# Patient Record
Sex: FEMALE | Race: WHITE | Employment: OTHER | URBAN - METROPOLITAN AREA
[De-identification: names, ages, dates, MRNs, and addresses within clinical notes are randomized per-mention and may not be internally consistent; named-entity substitution may affect disease eponyms.]

---

## 2019-06-21 ENCOUNTER — HOSPITAL ENCOUNTER (INPATIENT)
Age: 74
LOS: 12 days | Discharge: HOME | DRG: 057 | End: 2019-07-03
Attending: PHYSICAL MEDICINE & REHABILITATION | Admitting: PHYSICAL MEDICINE & REHABILITATION
Payer: MEDICARE

## 2019-06-21 PROCEDURE — 1280000000 HC REHAB R&B

## 2019-06-21 ASSESSMENT — PAIN SCALES - GENERAL: PAINLEVEL_OUTOF10: 0

## 2019-06-21 NOTE — LETTER
PORTABLE PATIENT PROFILE  Pam Nelson  [id number]    MEDICAL DIAGNOSIS/CONDITION:   Patient Active Problem List   Diagnosis    [facility]    [facility]ure disorder ([facility]    History of resection of meningioma    Left hemiparesis ([facility]       INSURANCE INFORMATION:  Payor: [place] [employer] /  /  /     ADVANCED DIRECTIVES:   Advance Directives (For Healthcare)  Pre-existing DNR Comfort Care/DNR Arrest/DNI Order: No  Healthcare Directive: No, patient does not have an advance directive for healthcare treatment  Information on Healthcare Directives Requested: No  Advance Directives: Pt. not interested at this time  [unfilled]     EMERGENCY CONTACT:       RISK FACTORS:   Social History     Tobacco Use    Smoking status: Passive Smoke Exposure - Never Smoker    Smokeless tobacco: Never Used   Substance Use Topics    Alcohol use: Never     Frequency: Never       ALLERGIES:  No Known Allergies    IMMUNIZATIONS:    There is no immunization history on file for this patient. SWALLOWING:   Difficulty Chewing or Swallowing Food: No    VISION AND HEARING:   Sensory Problems  Visual impairment: Glasses    PHYSICIANS INVOLVED WITH CARE:    Matthew Ingram MD  No ref.  provider found  Collins Gerardo MD

## 2019-06-22 PROBLEM — I63.9 ACUTE CVA (CEREBROVASCULAR ACCIDENT) (HCC): Status: ACTIVE | Noted: 2019-06-22

## 2019-06-22 PROCEDURE — 97165 OT EVAL LOW COMPLEX 30 MIN: CPT

## 2019-06-22 PROCEDURE — 92610 EVALUATE SWALLOWING FUNCTION: CPT | Performed by: SPEECH-LANGUAGE PATHOLOGIST

## 2019-06-22 PROCEDURE — 97161 PT EVAL LOW COMPLEX 20 MIN: CPT

## 2019-06-22 PROCEDURE — 97530 THERAPEUTIC ACTIVITIES: CPT

## 2019-06-22 PROCEDURE — 92526 ORAL FUNCTION THERAPY: CPT | Performed by: SPEECH-LANGUAGE PATHOLOGIST

## 2019-06-22 PROCEDURE — 1280000000 HC REHAB R&B

## 2019-06-22 PROCEDURE — 97535 SELF CARE MNGMENT TRAINING: CPT

## 2019-06-22 PROCEDURE — 6370000000 HC RX 637 (ALT 250 FOR IP): Performed by: PHYSICAL MEDICINE & REHABILITATION

## 2019-06-22 PROCEDURE — 92523 SPEECH SOUND LANG COMPREHEN: CPT | Performed by: SPEECH-LANGUAGE PATHOLOGIST

## 2019-06-22 RX ORDER — LEVETIRACETAM 500 MG/1
500 TABLET ORAL 2 TIMES DAILY
COMMUNITY
Start: 2019-06-22 | End: 2019-10-08 | Stop reason: SDUPTHER

## 2019-06-22 RX ORDER — CLOPIDOGREL BISULFATE 75 MG/1
75 TABLET ORAL DAILY
Status: ON HOLD | COMMUNITY
Start: 2019-06-22 | End: 2019-07-02 | Stop reason: SDUPTHER

## 2019-06-22 RX ORDER — ATORVASTATIN CALCIUM 40 MG/1
40 TABLET, FILM COATED ORAL NIGHTLY
Status: DISCONTINUED | OUTPATIENT
Start: 2019-06-22 | End: 2019-06-23 | Stop reason: CLARIF

## 2019-06-22 RX ORDER — ACETAMINOPHEN 325 MG/1
650 TABLET ORAL EVERY 4 HOURS PRN
Status: DISCONTINUED | OUTPATIENT
Start: 2019-06-22 | End: 2019-07-03 | Stop reason: HOSPADM

## 2019-06-22 RX ORDER — ATORVASTATIN CALCIUM 40 MG/1
40 TABLET, FILM COATED ORAL DAILY
Status: ON HOLD | COMMUNITY
Start: 2019-06-22 | End: 2019-07-02 | Stop reason: SDUPTHER

## 2019-06-22 RX ORDER — CLOPIDOGREL BISULFATE 75 MG/1
75 TABLET ORAL DAILY
Status: DISCONTINUED | OUTPATIENT
Start: 2019-06-22 | End: 2019-07-03 | Stop reason: HOSPADM

## 2019-06-22 RX ORDER — ASPIRIN 81 MG/1
81 TABLET, CHEWABLE ORAL DAILY
Status: ON HOLD | COMMUNITY
Start: 2019-06-22 | End: 2019-07-02 | Stop reason: SDUPTHER

## 2019-06-22 RX ORDER — ASPIRIN 81 MG/1
81 TABLET, CHEWABLE ORAL DAILY
Status: DISCONTINUED | OUTPATIENT
Start: 2019-06-22 | End: 2019-07-03 | Stop reason: HOSPADM

## 2019-06-22 RX ORDER — LAMOTRIGINE 100 MG/1
200 TABLET ORAL 2 TIMES DAILY
Status: DISCONTINUED | OUTPATIENT
Start: 2019-06-22 | End: 2019-07-03 | Stop reason: HOSPADM

## 2019-06-22 RX ORDER — LAMOTRIGINE 200 MG/1
200 TABLET ORAL 2 TIMES DAILY
COMMUNITY
Start: 2019-06-22 | End: 2019-10-08 | Stop reason: SDUPTHER

## 2019-06-22 RX ORDER — LEVETIRACETAM 500 MG/1
500 TABLET ORAL 2 TIMES DAILY
Status: DISCONTINUED | OUTPATIENT
Start: 2019-06-22 | End: 2019-07-03 | Stop reason: HOSPADM

## 2019-06-22 RX ADMIN — LAMOTRIGINE 200 MG: 100 TABLET ORAL at 09:57

## 2019-06-22 RX ADMIN — ATORVASTATIN CALCIUM 40 MG: 40 TABLET, FILM COATED ORAL at 22:00

## 2019-06-22 RX ADMIN — LEVETIRACETAM 500 MG: 500 TABLET ORAL at 09:57

## 2019-06-22 RX ADMIN — CLOPIDOGREL 75 MG: 75 TABLET, FILM COATED ORAL at 09:57

## 2019-06-22 RX ADMIN — ASPIRIN 81 MG 81 MG: 81 TABLET ORAL at 09:57

## 2019-06-22 RX ADMIN — LAMOTRIGINE 200 MG: 100 TABLET ORAL at 22:00

## 2019-06-22 RX ADMIN — LEVETIRACETAM 500 MG: 500 TABLET ORAL at 22:00

## 2019-06-22 ASSESSMENT — 9 HOLE PEG TEST
TESTTIME_SECONDS: 24
TESTTIME_SECONDS: 39

## 2019-06-22 ASSESSMENT — PAIN SCALES - GENERAL
PAINLEVEL_OUTOF10: 0

## 2019-06-22 NOTE — PROGRESS NOTES
SPEECH/LANGUAGE PATHOLOGY  SPEECH/LANGUAGE/COGNITIVE EVALUATION      PATIENT NAME:  Ruben Isbell      :  1945      TODAY'S DATE:  2019      SPEECH PATHOLOGY DIAGNOSIS:   Mild-Moderate Cognitive-linguistic deficits    THERAPY RECOMMENDATIONS:   []Speech Pathology intervention is not warranted at this time. [x]Speech Pathology intervention is recommended with emphasis on the followin. To improve attention/ reasoning/ problem solving and recall of functional information for increased success and ability to return to OF  2.  Compensatory Strategy training and oral motor exercises for increased intelligibility  FIMS SCORES      Receptive    Current Status  4-5  Short Term Goal 6   Long Term Goal 6   Expressive    Current Status  5  Short Term Goal 6   Long Term Goal 6  Social Interaction    Current Status  3  Short Term Goal 5   Long Term Goal 6      Problem Solving    Current Status  4  Short Term Goal 5   Long Term Goal 6   Memory    Current Status  4  Short Term Goal 5   Long Term Goal 6                   MOTOR SPEECH          Oral Peripheral Examination   []Adequate lingual/labial strength   [x]Generalized oral weakness   []Right labiobuccal weakness   []Left labiobuccal weakness   []Right lingual deviation    []Left lingual deviation   []Inadequate velopharyngeal closure  []Oral apraxia      []CNT    Parameters of Speech Production  Respiration:  [x]WFL []SOB []Inadequate for speech production  Articulation:  []WFL [x]Distortions []Anticipatory struggle []CNT  Resonance:  [x]WFL []Hypernasal  []Hyponasal  []Nasal emission []CNT  Quality:   [x]WFL []Hoarse []Harsh []Strained [] Breathiness []CNT  Pitch:    [x]WFL []High []Low []CNT  Intensity: [x]WFL []Loud []Quiet []CNT  Fluency:  [x]Intact []Dysfluent []CNT  Prosody [x]Intact []Monotone []Irregular fluctuation      RECEPTIVE LANGUAGE       Comprehension of Yes/No Questions:   [x]WNL []Incomplete []Latent  []Inconsistent []Perseveration week  if warranted. [x]  Patient stated goals: agreed with above  [x]  Treatment goals discussed with [x]  patient/  []  family. [x]  The [x]  patient/ []  family understand the diagnosis, prognosis and plan of care. [] Malnutrition indicators have been identified and nursing has been notified to ensure a dietary consult is ordered. [x]The admitting diagnosis and active problem list, as listed below have been reviewed prior to initiation of this evaluation.      ADMITTING DIAGNOSIS: Acute CVA (cerebrovascular accident) [facility]) [I63.9]     ACTIVE PROBLEM LIST:   Patient Active Problem List   Diagnosis    Acute CVA (cerebrovascular accident) [facility])         Reji Olrando M.S., [address] [facility]  Speech-Language Pathologist  [id number]  6/22/2019

## 2019-06-22 NOTE — PLAN OF CARE
Problem: Falls - Risk of:  Goal: Will remain free from falls  Description  Will remain free from falls  6/22/2019 1103 by Lucy Hsu RN  Outcome: Met This Shift  6/22/2019 0851 by June Mathew RN  Outcome: Met This Shift

## 2019-06-22 NOTE — PLAN OF CARE
Problem: Falls - Risk of:  Goal: Will remain free from falls  Description  Will remain free from falls  Outcome: Met This Shift  Goal: Absence of physical injury  Description  Absence of physical injury  Outcome: Met This Shift     Problem: Mobility - Impaired:  Goal: Mobility will improve  Description  Mobility will improve  Outcome: Met This Shift

## 2019-06-22 NOTE — H&P
[address]                  [address] [facility],  [facility]                              HISTORY AND PHYSICAL    PATIENT NAME: Clinton Hammans                       :        1945  MED REC NO:   [id number]                            ROOM:       5501  ACCOUNT NO:   [de-identified]                           ADMIT DATE: 2019  PROVIDER:     Milad Solis MD    REHAB HISTORY AND PHYSICAL    CHIEF COMPLAINT:  CVA. HISTORY OF PRESENT ILLNESS:  The patient had a meningioma resected at  [facility] in [place] in . She did have seizures and was  on Keppra and Lamictal for those. She developed sudden onset of left  side weakness. She went initially to [facility] and then was  transferred back to [facility] where her neurologist is. MRI confirmed a  new CVA in the right hemisphere. She is functionally at a min to mod  assist level with everything and is felt to be a good candidate for  further rehab. PAST MEDICAL HISTORY:  1. Meningioma. 2.  Seizure disorder. ALLERGIES:  None known. CURRENT MEDICATIONS:  Include:  1. Aspirin. 2.  Lipitor. 3.  Plavix. 4.  Lamictal.  5.  Keppra. HABITS:  No smoking or alcohol. SOCIAL HISTORY:  She will be living with her son at discharge. REVIEW OF SYSTEMS:  Negative for prior HEENT problems. Negative for  prior cardiac or pulmonary problems. Negative for prior GI or   problems. Negative for prior orthopedic deficits. Neurologic is  positive for the meningioma and seizures. PHYSICAL EXAMINATION:  GENERAL:  Well-nourished, well-developed white female in no acute  distress. HEENT:  Head:  Normocephalic, atraumatic. Eyes:  Pupils equal, round,  reactive to light. Pharynx:  Normal.  LUNGS:  Clear to P and A. HEART:  Regular rate and rhythm. S1, S2 normal.  No murmurs or gallops. ABDOMEN:  Bowel sounds normal.  Soft, nontender. No masses or  organomegaly.   EXTREMITIES:

## 2019-06-22 NOTE — PROGRESS NOTES
(secs): 24     Assessment   Performance deficits / Impairments: Decreased functional mobility ; Decreased strength;Decreased endurance;Decreased coordination;Decreased ADL status; Decreased safe awareness;Decreased cognition;Decreased balance;Decreased fine motor control  Prognosis: Good  Decision Making: Low Complexity    Treatment/education: Initial OT eval completed. Pt presents supine in bed and agreeable to OT session. SBA for supine-sit EOB. Min A for functional mobility to/from bathroom using no AD. Completed ADLs this AM see above for assessment. Educated pt on OT POC and pt participated in goal planning. Educated pt on hand placement/body mechanics/posture/balance to increase safety with ADLs/functional transfers/mobility. Requires increased time to answer questsions due to what appears to be word fincing difficulty. Demonstrates fine motor coordination deficit. Pt demonstrates fair insight/safety awareness. Pt appears motivated/pleasant/cooperative. Pt appeared to have tolerated session well. Initiate OT POC.      REQUIRES OT FOLLOW UP: Yes  Activity Tolerance: Patient Tolerated treatment well  Safety Devices in place: Yes  Type of devices: Call light within reach        Plan    2x/day, 5-7 days/wk  Plan weeks: 1-3  Current Treatment Recommendations: Strengthening, Functional Mobility Training, Gait Training, Patient/Caregiver Education & Training, Home Management Training, Endurance Training, Equipment Evaluation, Education, & procurement, Balance Training, Neuromuscular Re-education, Safety Education & Training, Self-Care / ADL    Goals  Long term goals  Time Frame for Long term goals : 1-3 wks  Long term goal 1: Pt will be IND with self feeding  Long term goal 2: Pt will be Mod I for grooming task standing/seated at sink  Long term goal 3: Pt will be Mod I for UE dressing  Long term goal 4: Pt will be Mod I for LE dressing  Long term goal 5: Pt will be Mod I for bathing task seated/standing  Long term goals 6: Pt will be Mod I for commode and shower transfers  Long term goal 7: Pt will be SUP for homemaking task  Long term goal 8: Pt will demo G safety,insight,reasoning, and judgement during functional activities  Patient Goals   Patient goals : \"to improve my strength and balance\"       Therapy Time   Individual Concurrent Group Co-treatment   Time In Eval: 7:00  Tx:7:15         Time Out Eval: 7:15  Tx: 8:00         Minutes Eval: 15  Tx: [id number] [address], [address] [place] [address], OTR/L [id number]

## 2019-06-23 LAB
ANION GAP SERPL CALCULATED.3IONS-SCNC: 13 MMOL/L (ref 7–16)
BASOPHILS ABSOLUTE: 0.02 E9/L (ref 0–0.2)
BASOPHILS RELATIVE PERCENT: 0.4 % (ref 0–2)
BUN BLDV-MCNC: 14 MG/DL (ref 8–23)
CALCIUM SERPL-MCNC: 9.6 MG/DL (ref 8.6–10.2)
CHLORIDE BLD-SCNC: 104 MMOL/L (ref 98–107)
CO2: 25 MMOL/L (ref 22–29)
CREAT SERPL-MCNC: 0.7 MG/DL (ref 0.5–1)
EOSINOPHILS ABSOLUTE: 0.12 E9/L (ref 0.05–0.5)
EOSINOPHILS RELATIVE PERCENT: 2.2 % (ref 0–6)
GFR AFRICAN AMERICAN: >60
GFR NON-AFRICAN AMERICAN: >60 ML/MIN/1.73
GLUCOSE BLD-MCNC: 85 MG/DL (ref 74–99)
HCT VFR BLD CALC: 40.1 % (ref 34–48)
HEMOGLOBIN: 12.9 G/DL (ref 11.5–15.5)
IMMATURE GRANULOCYTES #: 0.01 E9/L
IMMATURE GRANULOCYTES %: 0.2 % (ref 0–5)
LYMPHOCYTES ABSOLUTE: 1.77 E9/L (ref 1.5–4)
LYMPHOCYTES RELATIVE PERCENT: 32.2 % (ref 20–42)
MCH RBC QN AUTO: 29.4 PG (ref 26–35)
MCHC RBC AUTO-ENTMCNC: 32.2 % (ref 32–34.5)
MCV RBC AUTO: 91.3 FL (ref 80–99.9)
MONOCYTES ABSOLUTE: 0.49 E9/L (ref 0.1–0.95)
MONOCYTES RELATIVE PERCENT: 8.9 % (ref 2–12)
NEUTROPHILS ABSOLUTE: 3.09 E9/L (ref 1.8–7.3)
NEUTROPHILS RELATIVE PERCENT: 56.1 % (ref 43–80)
PDW BLD-RTO: 14.3 FL (ref 11.5–15)
PLATELET # BLD: 325 E9/L (ref 130–450)
PMV BLD AUTO: 9.1 FL (ref 7–12)
POTASSIUM REFLEX MAGNESIUM: 4.2 MMOL/L (ref 3.5–5)
RBC # BLD: 4.39 E12/L (ref 3.5–5.5)
SODIUM BLD-SCNC: 142 MMOL/L (ref 132–146)
WBC # BLD: 5.5 E9/L (ref 4.5–11.5)

## 2019-06-23 PROCEDURE — 6370000000 HC RX 637 (ALT 250 FOR IP): Performed by: PHYSICAL MEDICINE & REHABILITATION

## 2019-06-23 PROCEDURE — 1280000000 HC REHAB R&B

## 2019-06-23 PROCEDURE — 97530 THERAPEUTIC ACTIVITIES: CPT

## 2019-06-23 PROCEDURE — 36415 COLL VENOUS BLD VENIPUNCTURE: CPT

## 2019-06-23 PROCEDURE — 80048 BASIC METABOLIC PNL TOTAL CA: CPT

## 2019-06-23 PROCEDURE — 85025 COMPLETE CBC W/AUTO DIFF WBC: CPT

## 2019-06-23 RX ORDER — ATORVASTATIN CALCIUM 40 MG/1
40 TABLET, FILM COATED ORAL NIGHTLY
Status: DISCONTINUED | OUTPATIENT
Start: 2019-06-23 | End: 2019-07-03 | Stop reason: HOSPADM

## 2019-06-23 RX ORDER — ERGOCALCIFEROL 1.25 MG/1
50000 CAPSULE ORAL WEEKLY
Status: DISCONTINUED | OUTPATIENT
Start: 2019-06-23 | End: 2019-07-03 | Stop reason: HOSPADM

## 2019-06-23 RX ADMIN — LAMOTRIGINE 200 MG: 100 TABLET ORAL at 09:55

## 2019-06-23 RX ADMIN — CLOPIDOGREL 75 MG: 75 TABLET, FILM COATED ORAL at 09:55

## 2019-06-23 RX ADMIN — LAMOTRIGINE 200 MG: 100 TABLET ORAL at 21:21

## 2019-06-23 RX ADMIN — LEVETIRACETAM 500 MG: 500 TABLET ORAL at 09:55

## 2019-06-23 RX ADMIN — ATORVASTATIN CALCIUM 40 MG: 40 TABLET, FILM COATED ORAL at 21:22

## 2019-06-23 RX ADMIN — ERGOCALCIFEROL 50000 UNITS: 1.25 CAPSULE, LIQUID FILLED ORAL at 09:55

## 2019-06-23 RX ADMIN — ASPIRIN 81 MG 81 MG: 81 TABLET ORAL at 09:55

## 2019-06-23 RX ADMIN — LEVETIRACETAM 500 MG: 500 TABLET ORAL at 21:22

## 2019-06-23 ASSESSMENT — PAIN SCALES - GENERAL
PAINLEVEL_OUTOF10: 0

## 2019-06-23 NOTE — PLAN OF CARE
Problem: Falls - Risk of:  Goal: Will remain free from falls  Description  Will remain free from falls  6/23/2019 1437 by Pablito Verde RN  Outcome: Met This Shift  6/23/2019 0209 by Shon Coburn RN  Outcome: Met This Shift

## 2019-06-24 PROCEDURE — 97530 THERAPEUTIC ACTIVITIES: CPT

## 2019-06-24 PROCEDURE — 97110 THERAPEUTIC EXERCISES: CPT

## 2019-06-24 PROCEDURE — 92526 ORAL FUNCTION THERAPY: CPT

## 2019-06-24 PROCEDURE — 1280000000 HC REHAB R&B

## 2019-06-24 PROCEDURE — 6370000000 HC RX 637 (ALT 250 FOR IP): Performed by: PHYSICAL MEDICINE & REHABILITATION

## 2019-06-24 PROCEDURE — 97127 HC SP THER IVNTJ W/FOCUS COG FUNCJ: CPT

## 2019-06-24 PROCEDURE — 97112 NEUROMUSCULAR REEDUCATION: CPT

## 2019-06-24 PROCEDURE — 99232 SBSQ HOSP IP/OBS MODERATE 35: CPT | Performed by: PHYSICAL MEDICINE & REHABILITATION

## 2019-06-24 RX ADMIN — CLOPIDOGREL 75 MG: 75 TABLET, FILM COATED ORAL at 08:25

## 2019-06-24 RX ADMIN — LEVETIRACETAM 500 MG: 500 TABLET ORAL at 20:46

## 2019-06-24 RX ADMIN — ASPIRIN 81 MG 81 MG: 81 TABLET ORAL at 08:25

## 2019-06-24 RX ADMIN — LAMOTRIGINE 200 MG: 100 TABLET ORAL at 08:25

## 2019-06-24 RX ADMIN — ATORVASTATIN CALCIUM 40 MG: 40 TABLET, FILM COATED ORAL at 20:46

## 2019-06-24 RX ADMIN — LEVETIRACETAM 500 MG: 500 TABLET ORAL at 08:25

## 2019-06-24 RX ADMIN — LAMOTRIGINE 200 MG: 100 TABLET ORAL at 20:46

## 2019-06-24 ASSESSMENT — PAIN SCALES - GENERAL: PAINLEVEL_OUTOF10: 0

## 2019-06-24 NOTE — CARE COORDINATION
Social Work Assessment Summary    PCP: CELSO NeumannVA)    Patient Resides: Alone. She is . Home Architecture : She will move in with [facility]. They reside in a [facility] with (2) front entry steps (2) rails and (0) entry steps thru the garage. Pt. Uses a walk in shower. Will you return to your own home? Yes        Primary Caregiver: Pt. Has (3) children; Eleuterio Toure (36) lives in [address] (37) and Kristofer (52) who lives in [place]. Marcie works [address] [place] [address] as a  of the [employer]. Daughter in law, Markell Israel, does not work. Level of Function PTA : Pt. Was independent in all areas. Employment: "[employer]. [address] 36hrs/wk.     DME Pta  : None    Community Agency Involvement PTA : None    Do they have a medical profile: No    Family Teaching: to be scheduled    Strength: \"I am nice and motivated\"    Preference: \"strength in arm & leg and improve balance\"      NAME RELATION HOME # WORK # CELL #   [place] [facility] [place] [phone]  [phone]   [facility] [address] [place] [address]       Height: 5'2  Weight: 125    Jocelyn Bump  6/24/2019

## 2019-06-24 NOTE — PROGRESS NOTES
LE: 4+/5  Left LE: hip 3+/5, knee 4-/5, ankle 3/5 R LE: 4+/5  Left LE: hip 3+/5, knee 4-/5, ankle 3/5      Balance  Sitting: sup  Standing: cg/sba without AD Sitting: sup  Standing: CGA      Date Family Teach Completed TBA TBD      Is additional Family Teaching Needed? Y or N Y Y      Hindering Progress Balance/coordination Balance/coordination, endurance       PT recommended ELOS 7-10 days 10 days      Team's Discharge Plan        Therapist at Team Meeting           Date: 6/24/19  Supporting factors: Pt motivated with good carryover   Barriers to discharge: decreased balance and coordination deficits  Additional comments: Pt requires cues for safety with mobility as pt is mildly impulsive at times. Pt able to correct gait deficits with cues but does not sustain. May require supervision at d/c (goals kept at mod I at this time).   DME: None (but will assess for need for AD)  After Care: Regla Santos PTA [id number]

## 2019-06-24 NOTE — PROGRESS NOTES
Occupational Therapy  OCCUPATIONAL THERAPY DAILY NOTE    Date:2019  Patient Name: Heather Lundberg  MRN: [id number]  : 1945  Room: [address] [place] [address]     Diagnosis: CVA  Additional Pertinent Hx: none on file  Referring Practitioner:   Precautions: falls, L sided weakness, holter monitor (L chest)    Functional Assessment:   Date Status AE  Comments   Feeding 19 Set up     Grooming 19 Minimal Assist      Bathing 19 Minimal Assist      UB Dressing 19 Min A     LB Dressing 19 Stand by Assist      Homemaking 19 Min A No AD Min A due to unsteady balance with functional mobility around kitchen. See below. Functional Transfers / Balance:   Date Status DME  Comments   Sit Balance 19 Supervision      Stand Balance 19 Minimal Assist  tabletop In kitchen   [] Tub  [x] Shower   Transfer 19 Minimal Assist      Commode   Transfer 19 Minimal Assist      Functional   Mobility 19 Minimal Assist  w/w Throughout kitchen   Other:         Functional Exercises / Activity:   LUE ROM and strength with arm bike 5 mins for 3 reps   LUE gross motor coordination and strength with graded clothespin activity   LUE fine motor coordination with purdue peg board with fair + tip to tip prehension patterns present     Pt participated in standing balance/endurance activity at SBA at table top level. Pt tolerated standing for 8 mins before needing seated rest break     Pt seen this PM in kitchen. Pt retrieved items from fridge, followed directions to bake cookies, mixed items, spread onto pan, place into oven, and wash dishes at sink. Pt had 1 LOB at counter and required mutiple sitting rest breaks throughout session. Pt appeared to have tolerated session well.     Sensory / Neuromuscular Re-Education:      Cognitive Skills:   Status Comments   Problem   Solving poor+ During functional activities   Memory good - \"   Sequencing fair  \"   Safety fair  \"     Visual

## 2019-06-24 NOTE — PROGRESS NOTES
Speech Language Pathology  ACUTE REHABILITATION -- DAILY PROGRESS NOTE                 SWALLOWING:  Current level: SUPERVISION    Patient actively participated in functionally-based mealtime assessment; required min assistance to set up meal tray but was able to feed self independently. Reviewed need for slow rate of intake and regulated bite size prior to initiation of PO intake. Diet: Regular consistency solids and thin consistency liquids     Oral prep/oral-     No oral containment issues were identified. Adequate oral prep and A-P propulsion of bolus were noted with good clearance of oral residuals. Pharyngeal-     Clear vocal quality was maintained throughout. No overt clinical indicators of aspiration were apparent. LANGUAGE:        RECEPTIVE:  Current FIM level: SUPERVISION/MIN ASSISTANCE      Short term FIM goal: SUPERVISION      Long term FIM goal: MODIFIED INDEPENDENT          EXPRESSIVE:   Current FIM level: SUPERVISION      Short term FIM goal:       Long term FIM goal:  MODIFIED INDEPENDENT    Pt expressed wants and needs appropriately     COGNITION:       PROBLEM SOLVING: Current FIM level: MIN ASSISTANCE      Short term FIM goal: SUPERVISION       Long term FIM goal: MODIFIED INDEPENDENT         MEMORY:    Current FIM level: MIN ASSISTANCE        Short term FIM goal: SUPERVISION      Long term FIM goal:  MODIFIED INDEPENDENT        SAFETY/INSIGHT:  Fair for all observed tasks. Functional problem solving for medication completed with fair ability and mod cuing. Per pt, she will be living with her son. Pt reported sorting and problem solving independently prior to admission to the hospital. Education way provided on strategies to improve medication management. Pt was in agreement with the education. SPEECH INTELLIGIBILITY:  Good for unstructured conversation, unknown topic.        Three Hour Rule Tracking:    Individual therapy:              0 minutes  Concurrent

## 2019-06-24 NOTE — PROGRESS NOTES
[facility]  Comprehensive Progress Note    Subjective:      Pam Nelson is a 69 y.o. female admitted to inpatient rehabilitation for impairments and activities limitations secondary to Acute CVA    No acute events overnight. No cp, sob, n/v. No pain complaints. Tolerating rehab evaluations. The patient's medical records have been reviewed. Scheduled Meds:  vitamin D 50,000 Units Weekly   atorvastatin 40 mg Nightly   aspirin 81 mg Daily   clopidogrel 75 mg Daily   lamoTRIgine 200 mg BID   levETIRAcetam 500 mg BID     Continuous Infusions:  PRN Meds:  acetaminophen 650 mg Q4H PRN   magnesium hydroxide 30 mL Daily PRN        Objective:      Vitals:    06/22/19 0800 06/23/19 0745 06/23/19 2100 06/24/19 0737   BP: 121/81 113/69 116/72 119/80   Pulse: 70 77 68 78   Resp: 17 16  18   Temp: 97.6 °F (36.4 °C) 98.4 °F (36.9 °C)  97.6 °F (36.4 °C)   TempSrc: Temporal Temporal  Temporal   SpO2: 95% 93%  95%   Weight:       Height:         General appearance: alert, appears stated age and cooperative  Head: Normocephalic, without obvious abnormality, atraumatic  Eyes: conjunctivae/corneas clear. PERRL, EOM's intact. Lungs: clear to auscultation bilaterally  Heart: regular rate and rhythm, S1, S2 normal, no murmur  Abdomen: soft, non-tender, normal bowel sounds  Extremities: extremities normal, atraumatic, no cyanosis or edema  Musculoskeletal: Range of motion normal and no gross abnormalities. Neurologic: AAOx4. Speech minimally dysarthric. Memory impaired, recall 1/3 at 3 min. Impaired problem solving. CN II-XII intactSILT throughout. Motor 5/5 right upper and lower extremity; 4/5 left proximal upper extremity; 3/5 left hand intrinsics; 4-/5 left HF, 4/5 left KE, 3-4-/5 left ankle DF/PF.        Laboratory:    Lab Results   Component Value Date    WBC 5.5 06/23/2019    HGB 12.9 06/23/2019    HCT 40.1 06/23/2019    MCV 91.3 06/23/2019     06/23/2019     Lab Results Component Value Date     06/23/2019    K 4.2 06/23/2019     06/23/2019    CO2 25 06/23/2019    BUN 14 06/23/2019    CREATININE 0.7 06/23/2019    GLUCOSE 85 06/23/2019    CALCIUM 9.6 06/23/2019          Functional Status:   Grooming: Min A  UB dressing: Min A  LB dressing: SBA  Bed mobility: SBA  Transfers: SBA  Ambulation: 200 ft no AD CGA       Assessment/Plan:       69 y.o. 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 14 day implanted Holter monitor (L chest) in place. Continue Aspirin, Plavix, Lipitor.  Continue acute rehab evaluations.   -History of seizure disorder post meningioma resection in 2001: continue [facility]    Team conference tomorrow    Electronically signed by Jelani Pacheco MD on 6/24/2019 at 3:18 PM

## 2019-06-24 NOTE — PROGRESS NOTES
Physical Therapy    Facility/Department: [address] REHAB  Treatment Note    NAME: Shirin Dean  : 1945  MRN: [id number]    Date of Service: 2019    Evaluating Therapist: Karene Babinski, DPT    ROOM: [address] [place] [address]  DIAGNOSIS: acute CVA  PMH: To hospital for for left sided weakness on 19. MRI of brain shows restricted diffusion consistent with acute ischemia involving right parasagittal mid gio.  EEG showed mid/moderate right temporal slowing, suggesting R sided focal lesion. PMH includes epilepsy s/p meningioma resection in ,     PRECAUTIONS: falls     Social:  Pt lives alone in a 1 floor plan apartment 7 steps and one right rail. Pt reporting she plans to move into sons home upon d/c. (home with son, will live in basement set up apartment (shower/bedroom). Reports 12 steps with bilateral HR to kitchen/main living area. PTA was Independent with all mobility. Initial Evaluation  19 AM     PM    Short Term Goals Long Term Goals    Was pt agreeable to Eval/treatment? yes Yes Yes     Does pt have pain?  No pain No No     Bed Mobility  Rolling: sba  Supine to sit: sba  Sit to supine: sba  Scooting: sba Rolling: SBA  Supine to sit: SBA  Sit to supine: SBA  Scooting: SBA NT sup Mod I   Transfers Sit to stand: sba  Stand to sit: sba  Stand pivot: sba without AD Sit to stand: SBA  Stand to sit: SBA  Stand pivot: SBA without AD Sit to stand: SBA  Stand to sit: SBA  Stand pivot: SBA without AD sup Mod I    Ambulation    200 feet with cg/sba with no  feet with no AD with close  feet with no AD with  feet with AAD with sup >500 feet with AAD with mod I   Walking 10 feet on uneven surface 10 feet with cg/sba with no AD NT  feet with AAD with sba >500 feet with AAD with sup   Wheel Chair Mobility n/a NT NT     Car Transfers sba NT SBA sup Mod I   Stair negotiation: ascended and descended  12 steps with bilateral rail with cg/sba 12 steps with bilateral rail with close SBA NT

## 2019-06-25 PROBLEM — Z98.890 HISTORY OF RESECTION OF MENINGIOMA: Status: ACTIVE | Noted: 2019-06-25

## 2019-06-25 PROBLEM — G81.94 LEFT HEMIPARESIS (HCC): Status: ACTIVE | Noted: 2019-06-25

## 2019-06-25 PROBLEM — G40.909 SEIZURE DISORDER (HCC): Status: ACTIVE | Noted: 2019-06-25

## 2019-06-25 PROBLEM — Z86.03 HISTORY OF RESECTION OF MENINGIOMA: Status: ACTIVE | Noted: 2019-06-25

## 2019-06-25 PROCEDURE — 97530 THERAPEUTIC ACTIVITIES: CPT

## 2019-06-25 PROCEDURE — 97535 SELF CARE MNGMENT TRAINING: CPT

## 2019-06-25 PROCEDURE — 97127 HC SP THER IVNTJ W/FOCUS COG FUNCJ: CPT

## 2019-06-25 PROCEDURE — 99232 SBSQ HOSP IP/OBS MODERATE 35: CPT | Performed by: PHYSICAL MEDICINE & REHABILITATION

## 2019-06-25 PROCEDURE — 97110 THERAPEUTIC EXERCISES: CPT

## 2019-06-25 PROCEDURE — 6370000000 HC RX 637 (ALT 250 FOR IP): Performed by: PHYSICAL MEDICINE & REHABILITATION

## 2019-06-25 PROCEDURE — 92526 ORAL FUNCTION THERAPY: CPT

## 2019-06-25 PROCEDURE — 1280000000 HC REHAB R&B

## 2019-06-25 RX ADMIN — ATORVASTATIN CALCIUM 40 MG: 40 TABLET, FILM COATED ORAL at 21:18

## 2019-06-25 RX ADMIN — LAMOTRIGINE 200 MG: 100 TABLET ORAL at 08:43

## 2019-06-25 RX ADMIN — LAMOTRIGINE 200 MG: 100 TABLET ORAL at 21:18

## 2019-06-25 RX ADMIN — CLOPIDOGREL 75 MG: 75 TABLET, FILM COATED ORAL at 08:43

## 2019-06-25 RX ADMIN — LEVETIRACETAM 500 MG: 500 TABLET ORAL at 21:18

## 2019-06-25 RX ADMIN — ASPIRIN 81 MG 81 MG: 81 TABLET ORAL at 08:43

## 2019-06-25 RX ADMIN — LEVETIRACETAM 500 MG: 500 TABLET ORAL at 08:43

## 2019-06-25 SDOH — HEALTH STABILITY: MENTAL HEALTH: HOW OFTEN DO YOU HAVE A DRINK CONTAINING ALCOHOL?: NEVER

## 2019-06-25 ASSESSMENT — PAIN SCALES - GENERAL
PAINLEVEL_OUTOF10: 0

## 2019-06-25 NOTE — PROGRESS NOTES
1130-12  Group therapy:   0 minutes  Co-treatment therapy:  0  minutes    Total minutes: 60 minutes      Viktor Lockett  Speech-Language Pathology Student Intern

## 2019-06-25 NOTE — PROGRESS NOTES
Occupational Therapy  OCCUPATIONAL THERAPY DAILY NOTE    Date:2019  Patient Name: Val Keenan  MRN: [id number]  : 1945  Room: [address] [place] [address]     Diagnosis: CVA  Additional Pertinent Hx: none on file  Referring Practitioner:   Precautions: falls, L sided weakness, holter monitor (L chest)    Functional Assessment:   Date Status AE  Comments   Feeding 19 Set up     Grooming 19 Minimal Assist      Bathing 19 Minimal Assist      UB Dressing 19 Min A     LB Dressing 19 Stand by Assist      Homemaking 19  Min A No AD Folding laundry seated and standing      Functional Transfers / Balance:   Date Status DME  Comments   Sit Balance 19 Supervision      Stand Balance 19  CGA   tabletop    [] Tub  [x] Shower   Transfer 19 Minimal Assist      Commode   Transfer 19 Minimal Assist      Functional   Mobility 19  CGA/Min A  SPC  Throughout therapy apt with one loss of balance needing Min A to recover    Other:         Functional Exercises / Activity:   LUE strength with 2 # dowel rachna 3 sets 10 reps in all planes   L hand strength with 3 # digi flex 3 sets 10 reps   Pt completed 3 D copy design block  patterns with Min verbal cues to complete complex patterns     Sensory / Neuromuscular Re-Education:      Cognitive Skills:  Pt completed safety cards at 100% accuracy   Status Comments   Problem   Solving poor+ During functional activities   Memory good - \"   Sequencing fair  \"   Safety fair  \"     Visual Perception:    Education:   pt was educated on correct sequence of SPC during functional transfers and mobility     [] Family teach completed on:    Pain Level: 0/10    Additional Notes:       Patient has made good  progress during treatment sessions toward set goals.  Therapy emphasis to obtain goals: Strengthening, Functional Mobility Training, Gait Training, Patient/Caregiver Education & Training, Home Management Training, Endurance Training,

## 2019-06-25 NOTE — PROGRESS NOTES
Physical Therapy    Facility/Department: [address] REHAB  Treatment Note    NAME: Olamide Vargas  : 1945  MRN: [id number]    Date of Service: 2019    Evaluating Therapist: Delaney Banks DPT    ROOM: [address] [place] [address]  DIAGNOSIS: acute CVA  PMH: To hospital for for left sided weakness on 19. MRI of brain shows restricted diffusion consistent with acute ischemia involving right parasagittal mid gio.  EEG showed mid/moderate right temporal slowing, suggesting R sided focal lesion. PMH includes epilepsy s/p meningioma resection in ,     PRECAUTIONS: falls     Social:  Pt lives alone in a 1 floor plan apartment 7 steps and one right rail. Pt reporting she plans to move into sons home upon d/c. (home with son, will live in basement set up apartment (shower/bedroom). Reports 12 steps with bilateral HR to kitchen/main living area. PTA was Independent with all mobility. Initial Evaluation  19 AM     PM    Short Term Goals Long Term Goals    Was pt agreeable to Eval/treatment? yes Yes Yes     Does pt have pain? No pain No No     Bed Mobility  Rolling: sba  Supine to sit: sba  Sit to supine: sba  Scooting: sba NT NT sup Mod I   Transfers Sit to stand: sba  Stand to sit: sba  Stand pivot: sba without AD Sit to stand: SBA  Stand to sit: SBA  Stand pivot: SBA without AD Sit to stand: SBA  Stand to sit: SBA  Stand pivot: SBA without AD sup Mod I    Ambulation    200 feet with cg/sba with no  feet with no AD with SBA    150 feet with straight cane with SBA. 150 feet with straight cane /no device with CG/SBA.    300 feet with AAD with sup >500 feet with AAD with mod I   Walking 10 feet on uneven surface 10 feet with cg/sba with no AD NT  feet with AAD with sba >500 feet with AAD with sup   Wheel Chair Mobility n/a NT NT     Car Transfers sba SBA NT sup Mod I   Stair negotiation: ascended and descended  12 steps with bilateral rail with cg/sba 12 steps with bilateral rail with SBA NT >12 steps

## 2019-06-25 NOTE — PLAN OF CARE
Problem: Falls - Risk of:  Goal: Will remain free from falls  Description  Will remain free from falls  6/25/2019 1719 by Funmi Zapata RN  Outcome: Met This Shift  6/25/2019 0527 by Mervat Torres RN  Outcome: Met This Shift

## 2019-06-26 PROCEDURE — 97110 THERAPEUTIC EXERCISES: CPT

## 2019-06-26 PROCEDURE — 6370000000 HC RX 637 (ALT 250 FOR IP): Performed by: PHYSICAL MEDICINE & REHABILITATION

## 2019-06-26 PROCEDURE — 97535 SELF CARE MNGMENT TRAINING: CPT

## 2019-06-26 PROCEDURE — 97530 THERAPEUTIC ACTIVITIES: CPT

## 2019-06-26 PROCEDURE — 97127 HC SP THER IVNTJ W/FOCUS COG FUNCJ: CPT

## 2019-06-26 PROCEDURE — 1280000000 HC REHAB R&B

## 2019-06-26 PROCEDURE — 97112 NEUROMUSCULAR REEDUCATION: CPT

## 2019-06-26 PROCEDURE — 99231 SBSQ HOSP IP/OBS SF/LOW 25: CPT | Performed by: PHYSICAL MEDICINE & REHABILITATION

## 2019-06-26 RX ADMIN — ASPIRIN 81 MG 81 MG: 81 TABLET ORAL at 08:41

## 2019-06-26 RX ADMIN — LEVETIRACETAM 500 MG: 500 TABLET ORAL at 08:41

## 2019-06-26 RX ADMIN — ATORVASTATIN CALCIUM 40 MG: 40 TABLET, FILM COATED ORAL at 21:20

## 2019-06-26 RX ADMIN — LEVETIRACETAM 500 MG: 500 TABLET ORAL at 21:20

## 2019-06-26 RX ADMIN — CLOPIDOGREL 75 MG: 75 TABLET, FILM COATED ORAL at 08:40

## 2019-06-26 RX ADMIN — LAMOTRIGINE 200 MG: 100 TABLET ORAL at 08:41

## 2019-06-26 RX ADMIN — LAMOTRIGINE 200 MG: 100 TABLET ORAL at 21:20

## 2019-06-26 ASSESSMENT — PAIN SCALES - GENERAL
PAINLEVEL_OUTOF10: 0

## 2019-06-26 NOTE — PROGRESS NOTES
sba SBA NT sup Mod I   Stair negotiation: ascended and descended  12 steps with bilateral rail with cg/sba 12 steps with bilateral rail with SBA 12 steps with bilateral rails with SBA >12 steps with one rail with sup >12 steps with one rail with mod I   Curb Step:   ascended and descended 4 inch step with no AD and cgA NT NT 7.5 inch step with AAD and sup 7.5 inch step with AAD and mod I   Picking up object off the floor Picked up 2# with sba assist NT NT     BLE ROM WFL       BLE Strength R LE: 4+/5  Left LE: hip 3+/5, knee 4-/5, ankle 3/5       Balance  Sitting: sup  Standing: cg/sba without AD       Date Family Teach Completed TBA       Is additional Family Teaching Needed? Y or N Y       Hindering Progress Balance/coordination       PT recommended ELOS 7-10 days       Team's Discharge Plan        Therapist at Team Meeting           Ther-ex:  AM  2 inch step  toe taps with LLE 2 sets 10 reps with no UE support  4\" step ups  2 sets 10 reps with no UE support  Toe raises ( with unilateral UE x10)     PM  In parallel bars   Agility ladder activities - stepping over rungs and side stepping over rungs   Tapping cones with B LE ( x10)   Step ups ( x10)   Lateral step ups ( x10)     Comments:  AM  Pt is easily distracted during mobility and pt with decreased safety/balance. LOB x1 with ambulation with min A. Shuffling gait pattern noted with ambulation especially with fatigue. PM   Recommend pt to use ww at this time due to improved balance noted. Pt still easily distracted and decreased cognition resulting in decreased safety awareness with mobility.            Patient education  Pt educated on safety with functional mobility     Patient response to education:   Pt verbalized understanding Pt demonstrated skill Pt requires further education in this area   x X with verbal cues  x     AM  Time in: 0915  Time out: 1000    PM  Time in: 1300  Time out: [address] [id number]

## 2019-06-26 NOTE — PROGRESS NOTES
Occupational Therapy  OCCUPATIONAL THERAPY DAILY NOTE    Date:2019  Patient Name: Eder Wright  MRN: [id number]  : 1945  Room: [address] [place] [address]     Diagnosis: CVA  Additional Pertinent Hx: none on file  Referring Practitioner:   Precautions: falls, L sided weakness, holter monitor (L chest)    Functional Assessment:   Date Status AE  Comments   Feeding 19  Set up     Grooming 19  Set up   Pt completed oral and hair care seated at sink   Bathing 19  Minimal Assist   Bathing completed in shower. Assist to stand and wash buttocks ( Aqua guard placed over external heart holter monitor)    UB Dressing 19  Set up   Pt donned pull over shirt    LB Dressing 19  Stand by Assist   Pt donned underpants, pants,socks and shoes seated. SBA to stand and pull up pants    Homemaking 19  Min A No AD      Functional Transfers / Balance:   Date Status DME  Comments   Sit Balance 19  Supervision   During LB dressing    Stand Balance 19  CGA   tabletop Standing to wash in shower    [] Tub  [x] Shower   Transfer 19  CGA  Shower seat, grab rails  Transfer in and out of walk in shower    Commode   Transfer 19  CGA  Grab rail     Functional   Mobility 19   CGA ww Throughout pt's room with ww gathering clothes for shower.  Verbal cues for walker safety    Other:         Functional Exercises / Activity:   LUE strength exercises with lashay box with 8 # wt on table top surface 3 sets 10 reps in all planes  Pt completed fine motor coordination to left hand with pt completing MRMT with fair + tip to tip prehension patterns     Sensory / Neuromuscular Re-Education:      Cognitive Skills:     Status Comments   Problem   Solving poor+ During functional activities   Memory good - \"   Sequencing fair  \"   Safety fair  \"     Visual Perception:    Education:  Pt was educated on safe functional transfers and mobility with ww      [] Family teach completed on:    Pain Level:

## 2019-06-26 NOTE — PROGRESS NOTES
[facility]  Comprehensive Progress Note    Subjective:      Olamide Vargas is a 69 y.o. female admitted to inpatient rehabilitation for impairments and activities limitations secondary to Acute CVA    No acute events overnight. No cp, sob, n/v. No pain. Tolerating therapy program. She has no complaints. The patient's medical records have been reviewed. Scheduled Meds:    vitamin D 50,000 Units Weekly   atorvastatin 40 mg Nightly   aspirin 81 mg Daily   clopidogrel 75 mg Daily   lamoTRIgine 200 mg BID   levETIRAcetam 500 mg BID     Continuous Infusions:  PRN Meds:    acetaminophen 650 mg Q4H PRN   magnesium hydroxide 30 mL Daily PRN        Objective:      Vitals:    06/24/19 1730 06/25/19 0743 06/25/19 1545 06/26/19 0741   BP: 116/66 119/68 125/77 112/75   Pulse: 80 65 69 82   Resp: 16 18 17 15   Temp: 97.1 °F (36.2 °C) 98 °F (36.7 °C) 97.9 °F (36.6 °C) 98.2 °F (36.8 °C)   TempSrc:  Temporal Temporal Temporal   SpO2: 95% 94% 97%    Weight:       Height:         General appearance: alert, appears stated age and cooperative  Eyes: conjunctivae/corneas clear. PERRL, EOM's intact. Lungs: clear to auscultation bilaterally  Heart: regular rate and rhythm, S1, S2 normal, no murmur  Abdomen: soft, non-tender, normal bowel sounds. Implanted holter in place L chest.  Extremities: extremities normal, atraumatic, no cyanosis or edema  Musculoskeletal: Range of motion normal and no gross abnormalities. Neurologic: AAOx4. Speech minimally dysarthric. Memory impaired. Impaired problem solving. CN II-XII intact. SILT throughout. Motor 5/5 right upper and lower extremity; 4/5 left proximal upper extremity; 3/5 left hand intrinsics; 4-/5 left HF, 4/5 left KE, 3-4-/5 left ankle DF/PF.        Laboratory:    Lab Results   Component Value Date    WBC 5.5 06/23/2019    HGB 12.9 06/23/2019    HCT 40.1 06/23/2019    MCV 91.3 06/23/2019     06/23/2019     Lab Results   Component Value Date

## 2019-06-27 LAB
ANION GAP SERPL CALCULATED.3IONS-SCNC: 11 MMOL/L (ref 7–16)
BUN BLDV-MCNC: 14 MG/DL (ref 8–23)
CALCIUM SERPL-MCNC: 9.5 MG/DL (ref 8.6–10.2)
CHLORIDE BLD-SCNC: 104 MMOL/L (ref 98–107)
CO2: 27 MMOL/L (ref 22–29)
CREAT SERPL-MCNC: 0.7 MG/DL (ref 0.5–1)
GFR AFRICAN AMERICAN: >60
GFR NON-AFRICAN AMERICAN: >60 ML/MIN/1.73
GLUCOSE BLD-MCNC: 86 MG/DL (ref 74–99)
HCT VFR BLD CALC: 40.1 % (ref 34–48)
HEMOGLOBIN: 13 G/DL (ref 11.5–15.5)
MCH RBC QN AUTO: 29.7 PG (ref 26–35)
MCHC RBC AUTO-ENTMCNC: 32.4 % (ref 32–34.5)
MCV RBC AUTO: 91.8 FL (ref 80–99.9)
PDW BLD-RTO: 14.2 FL (ref 11.5–15)
PLATELET # BLD: 335 E9/L (ref 130–450)
PMV BLD AUTO: 9.2 FL (ref 7–12)
POTASSIUM REFLEX MAGNESIUM: 4.1 MMOL/L (ref 3.5–5)
RBC # BLD: 4.37 E12/L (ref 3.5–5.5)
SODIUM BLD-SCNC: 142 MMOL/L (ref 132–146)
WBC # BLD: 5 E9/L (ref 4.5–11.5)

## 2019-06-27 PROCEDURE — 97110 THERAPEUTIC EXERCISES: CPT

## 2019-06-27 PROCEDURE — 1280000000 HC REHAB R&B

## 2019-06-27 PROCEDURE — 36415 COLL VENOUS BLD VENIPUNCTURE: CPT

## 2019-06-27 PROCEDURE — 97127 HC SP THER IVNTJ W/FOCUS COG FUNCJ: CPT

## 2019-06-27 PROCEDURE — 97530 THERAPEUTIC ACTIVITIES: CPT

## 2019-06-27 PROCEDURE — 80048 BASIC METABOLIC PNL TOTAL CA: CPT

## 2019-06-27 PROCEDURE — 85027 COMPLETE CBC AUTOMATED: CPT

## 2019-06-27 PROCEDURE — 97112 NEUROMUSCULAR REEDUCATION: CPT

## 2019-06-27 PROCEDURE — 6370000000 HC RX 637 (ALT 250 FOR IP): Performed by: PHYSICAL MEDICINE & REHABILITATION

## 2019-06-27 PROCEDURE — 99231 SBSQ HOSP IP/OBS SF/LOW 25: CPT | Performed by: PHYSICAL MEDICINE & REHABILITATION

## 2019-06-27 RX ADMIN — LAMOTRIGINE 200 MG: 100 TABLET ORAL at 20:39

## 2019-06-27 RX ADMIN — LAMOTRIGINE 200 MG: 100 TABLET ORAL at 09:23

## 2019-06-27 RX ADMIN — ATORVASTATIN CALCIUM 40 MG: 40 TABLET, FILM COATED ORAL at 20:39

## 2019-06-27 RX ADMIN — ASPIRIN 81 MG 81 MG: 81 TABLET ORAL at 09:23

## 2019-06-27 RX ADMIN — LEVETIRACETAM 500 MG: 500 TABLET ORAL at 20:39

## 2019-06-27 RX ADMIN — LEVETIRACETAM 500 MG: 500 TABLET ORAL at 09:23

## 2019-06-27 RX ADMIN — CLOPIDOGREL 75 MG: 75 TABLET, FILM COATED ORAL at 09:23

## 2019-06-27 ASSESSMENT — PAIN SCALES - GENERAL
PAINLEVEL_OUTOF10: 0

## 2019-06-27 NOTE — PROGRESS NOTES
Physical Therapy    Facility/Department: [address] REHAB  Treatment Note    NAME: Kyle Eid  : 1945  MRN: [id number]    Date of Service: 2019    Evaluating Therapist: Gabby Lau DPT    ROOM: [address] [place] [address]  DIAGNOSIS: acute CVA  PMH: To hospital for for left sided weakness on 19. MRI of brain shows restricted diffusion consistent with acute ischemia involving right parasagittal mid gio.  EEG showed mid/moderate right temporal slowing, suggesting R sided focal lesion. PMH includes epilepsy s/p meningioma resection in ,     PRECAUTIONS: falls     Social:  Pt lives alone in a 1 floor plan apartment 7 steps and one right rail. Pt reporting she plans to move into sons home upon d/c. (home with son, will live in basement set up apartment (shower/bedroom). Reports 12 steps with bilateral HR to kitchen/main living area. PTA was Independent with all mobility. **Per , [facility] architecture: Patient will have 3 steps in no rails. 1/2 bath on  and will be sponge bathing. Bedroom on        Initial Evaluation  19 AM     PM    Short Term Goals Long Term Goals    Was pt agreeable to Eval/treatment? yes Yes Yes     Does pt have pain?  No pain No No     Bed Mobility  Rolling: sba  Supine to sit: sba  Sit to supine: sba  Scooting: sba NT Sit to supine SBA  Supine to sit SBA  Scooting to edge of bed SBA sup Mod I   Transfers Sit to stand: sba  Stand to sit: sba  Stand pivot: sba without AD Sit to stand: SBA  Stand to sit: SBA  Stand pivot without device with CG/SBA Sit to stand: SBA  Stand to sit: SBA  Stand pivot: SBA without AD sup Mod I    Ambulation    200 feet with cg/sba with no  feet x2 with ww with SB/supervision  150 feet x2 with ww with  feet with AAD with sup >500 feet with AAD with mod I   Walking 10 feet on uneven surface 10 feet with cg/sba with no AD NT  feet with AAD with sba >500 feet with AAD with sup   Wheel Chair Mobility n/a NT NT     Car Transfers sba

## 2019-06-28 PROCEDURE — 6370000000 HC RX 637 (ALT 250 FOR IP): Performed by: PHYSICAL MEDICINE & REHABILITATION

## 2019-06-28 PROCEDURE — 99231 SBSQ HOSP IP/OBS SF/LOW 25: CPT | Performed by: PHYSICAL MEDICINE & REHABILITATION

## 2019-06-28 PROCEDURE — 97530 THERAPEUTIC ACTIVITIES: CPT

## 2019-06-28 PROCEDURE — 1280000000 HC REHAB R&B

## 2019-06-28 PROCEDURE — 97112 NEUROMUSCULAR REEDUCATION: CPT

## 2019-06-28 PROCEDURE — 97110 THERAPEUTIC EXERCISES: CPT

## 2019-06-28 PROCEDURE — 97127 HC SP THER IVNTJ W/FOCUS COG FUNCJ: CPT

## 2019-06-28 RX ADMIN — ASPIRIN 81 MG 81 MG: 81 TABLET ORAL at 07:55

## 2019-06-28 RX ADMIN — LEVETIRACETAM 500 MG: 500 TABLET ORAL at 21:18

## 2019-06-28 RX ADMIN — ATORVASTATIN CALCIUM 40 MG: 40 TABLET, FILM COATED ORAL at 21:17

## 2019-06-28 RX ADMIN — CLOPIDOGREL 75 MG: 75 TABLET, FILM COATED ORAL at 07:55

## 2019-06-28 RX ADMIN — LEVETIRACETAM 500 MG: 500 TABLET ORAL at 07:56

## 2019-06-28 RX ADMIN — LAMOTRIGINE 200 MG: 100 TABLET ORAL at 21:18

## 2019-06-28 RX ADMIN — LAMOTRIGINE 200 MG: 100 TABLET ORAL at 07:55

## 2019-06-28 ASSESSMENT — PAIN SCALES - GENERAL
PAINLEVEL_OUTOF10: 0

## 2019-06-28 NOTE — PROGRESS NOTES
Occupational Therapy  OCCUPATIONAL THERAPY DAILY NOTE    Date:2019  Patient Name: Crystal Figueroa  MRN: [id number]  : 1945  Room: [address] [place] [address]     Diagnosis: CVA  Additional Pertinent Hx: none on file  Referring Practitioner:   Precautions: falls, L sided weakness, holter monitor (L chest)    Functional Assessment:   Date Status AE  Comments   Feeding 19  Set up     Grooming 19  Set up      Bathing 19  Minimal Assist      UB Dressing 19  Set up      LB Dressing 19  Stand by Assist   Donned shoes and tied laces   Homemaking 19  Min A No AD      Functional Transfers / Balance:   Date Status DME  Comments   Sit Balance 19  Supervision      Stand Balance 19  SBA  Tabletop and ww level     [] Tub  [x] Shower   Transfer 19  CGA  Shower seat, grab rails  Transfer in and out of walk in shower over 2 inch step with cues for safe technique    Commode   Transfer 19  SBA  Grab rail  Verbal cues for hand placement    Functional   Mobility 19  SBA  ww Throughout therapy apt over tile and carpet floor surfaces    Other:on/off standard queen bed and kitchen chair without arm rests  19  SBA  ww Verbal cues for hand placement with sit to stand transfer at ww level      Functional Exercises / Activity:   pt completed copy design puzzle using left hand with pt needing verbal cues for problem solving   Fine motor coordination to left hand with pt completing purdue peg board. Pt demonstrating improved 3 point pinch patterns   Pt completed word search activity with focus on visual scanning and attention to detail. Pt completed at supervision level                                           Sensory / Neuromuscular Re-Education:      Cognitive Skills:     Status Comments   Problem   Solving poor+ During functional activities   Memory good - \"   Sequencing fair  \"   Safety fair  \"     Visual Perception:    Education:  Pt was educated on walker safety management

## 2019-06-29 PROCEDURE — 97530 THERAPEUTIC ACTIVITIES: CPT

## 2019-06-29 PROCEDURE — 92507 TX SP LANG VOICE COMM INDIV: CPT | Performed by: SPEECH-LANGUAGE PATHOLOGIST

## 2019-06-29 PROCEDURE — 92526 ORAL FUNCTION THERAPY: CPT | Performed by: SPEECH-LANGUAGE PATHOLOGIST

## 2019-06-29 PROCEDURE — 6370000000 HC RX 637 (ALT 250 FOR IP): Performed by: PHYSICAL MEDICINE & REHABILITATION

## 2019-06-29 PROCEDURE — 97535 SELF CARE MNGMENT TRAINING: CPT

## 2019-06-29 PROCEDURE — 1280000000 HC REHAB R&B

## 2019-06-29 RX ADMIN — LAMOTRIGINE 200 MG: 100 TABLET ORAL at 09:04

## 2019-06-29 RX ADMIN — ATORVASTATIN CALCIUM 40 MG: 40 TABLET, FILM COATED ORAL at 20:20

## 2019-06-29 RX ADMIN — ASPIRIN 81 MG 81 MG: 81 TABLET ORAL at 09:04

## 2019-06-29 RX ADMIN — CLOPIDOGREL 75 MG: 75 TABLET, FILM COATED ORAL at 09:04

## 2019-06-29 RX ADMIN — LAMOTRIGINE 200 MG: 100 TABLET ORAL at 20:20

## 2019-06-29 RX ADMIN — LEVETIRACETAM 500 MG: 500 TABLET ORAL at 20:20

## 2019-06-29 RX ADMIN — LEVETIRACETAM 500 MG: 500 TABLET ORAL at 09:04

## 2019-06-29 ASSESSMENT — PAIN SCALES - GENERAL
PAINLEVEL_OUTOF10: 0

## 2019-06-30 PROCEDURE — 97110 THERAPEUTIC EXERCISES: CPT

## 2019-06-30 PROCEDURE — 1280000000 HC REHAB R&B

## 2019-06-30 PROCEDURE — 6370000000 HC RX 637 (ALT 250 FOR IP): Performed by: PHYSICAL MEDICINE & REHABILITATION

## 2019-06-30 PROCEDURE — 97535 SELF CARE MNGMENT TRAINING: CPT

## 2019-06-30 PROCEDURE — 97530 THERAPEUTIC ACTIVITIES: CPT

## 2019-06-30 RX ADMIN — LAMOTRIGINE 200 MG: 100 TABLET ORAL at 08:32

## 2019-06-30 RX ADMIN — ATORVASTATIN CALCIUM 40 MG: 40 TABLET, FILM COATED ORAL at 21:01

## 2019-06-30 RX ADMIN — ASPIRIN 81 MG 81 MG: 81 TABLET ORAL at 08:32

## 2019-06-30 RX ADMIN — LAMOTRIGINE 200 MG: 100 TABLET ORAL at 21:01

## 2019-06-30 RX ADMIN — ERGOCALCIFEROL 50000 UNITS: 1.25 CAPSULE, LIQUID FILLED ORAL at 08:32

## 2019-06-30 RX ADMIN — LEVETIRACETAM 500 MG: 500 TABLET ORAL at 21:01

## 2019-06-30 RX ADMIN — CLOPIDOGREL 75 MG: 75 TABLET, FILM COATED ORAL at 08:32

## 2019-06-30 RX ADMIN — LEVETIRACETAM 500 MG: 500 TABLET ORAL at 08:32

## 2019-06-30 ASSESSMENT — PAIN SCALES - GENERAL
PAINLEVEL_OUTOF10: 0

## 2019-06-30 NOTE — PROGRESS NOTES
___Volunteer ___Club/Organization                                                     ___Other: ___________    Sports/Exercises:  ___Walking  ___Football  ___Basketball     ___Golf  ___Baseball  ___Tennis       ___Bowling  ___Other: ___________      Traveling:   ___Global  _C__Stateside  ___Local       ___Other: ___________      TV/Radio:   ___Mysteries  ___Comedies ___Romance                                                     ___Game show       ___Sports       ___News                                                      ___Talk show          _C__Other: ___A Variety________      OtherMayelin Jones identified feelings of being sad. Personal Leisure Profile:   Question Response   Attributes Identify a positive quality: []Ambitious  []Appreciative  [x]Caring  []Courteous  []Considerate  []Compassionate  []Creative  []Dependable   []Generous  []Honest  []Hard working  []Helpful  []Kind  []Saucier   []Ferris  []Mannerly  []Patient  []Polite  []Respectful  []Sociable  []Sense of humor  []Thoughtful  []Other: ___________    You are very good at [address] [place] [address]   Awareness Why do you participate in your leisure interest: []Competition  []Creativity  []Concentration  []Exercise  []Enjoyment  [x]Fun  []Hand/eye Coordination  []Increase confidence  []Learning a new skill  []Relaxation  []Social Interaction  []Supporting one another  []Thinking  []Other: ___________    Are your leisure activities limited at this time? [x]Yes  []No  Comments: _________    How often do you participate in your leisure interest? 3 x a week   Resources Name a restaurant, store or business you frequent? [employer]   Personal When are you most happy?  With my family     Barriers to Leisure Participation:  []Visual   []Ute  []Cognition/Communication  []Motivation  []Financial  []Transportation  []Time Constraints  [x]Physical Ability  []Leisure Awareness  []Drug/Alcohol  []Other: ___________    Recommendations:  [x]Group Session  [x]Individual Session

## 2019-06-30 NOTE — PROGRESS NOTES
Occupational Therapy  OCCUPATIONAL THERAPY DAILY NOTE    Date:2019  Patient Name: Brianna Chapa  MRN: [id number]  : 1945  Room: [address] [place] [address]A     Diagnosis: CVA  Additional Pertinent Hx: none on file  Referring Practitioner:   Precautions: falls, L sided weakness, holter monitor (L chest)    Functional Assessment:   Date Status AE  Comments   Feeding 19  Set up     Grooming 19  Set up      Bathing 19  SBA LH hose  Shower stall bench    UB Dressing 19  Mod I   .    LB Dressing 19  Sup  Pt completed crow/doff hospital pants including clothing mgmt with no LOB noted. Pt used crossover method to crow/doff shoes and tied shoelaces. Homemaking 19  CGA ww Pt completed item retrieval from pantry, fridge and cabinet using w.walker. Pt used walker basket to transport cups from pantry to countertop to stack into cabinet. Pt needed occ cues for hand placement due to balance issues. Functional Transfers / Balance:   Date Status DME  Comments   Sit Balance 19  Sup   Sitting balance up in arm chairs   Stand Balance 19  S/SBA  Tabletop  Ww, sink   counter Pt engaged in dyn/static balance during kitchen mobility and item transport. [] Tub  [x] Shower   Transfer 19  SBA Shower seat, grab rails      Commode   Transfer 19  S/SBA  Grab rail   3:1 device Pt completed 3:1 device transfer from w. Walker level. Functional   Mobility 19  S/SBA  ww Pt ambulated from room to gym then into OT kitchen using w.walker. Other:on/off standard queen bed and kitchen chair without arm rests  19  SBA  ww      Functional Exercises / Activity:  BUE AROM ex's completed during reaching and stacking tasks to increase strength especially LUE and endurance for ADL's and transfers.  Pt engaged in left handed resistive clothespins, picking up coins and placing in container and red putty pinch/ and rolling activity to increase strengthening for functional

## 2019-07-01 PROCEDURE — 97110 THERAPEUTIC EXERCISES: CPT

## 2019-07-01 PROCEDURE — 97530 THERAPEUTIC ACTIVITIES: CPT

## 2019-07-01 PROCEDURE — 1280000000 HC REHAB R&B

## 2019-07-01 PROCEDURE — 97127 HC SP THER IVNTJ W/FOCUS COG FUNCJ: CPT

## 2019-07-01 PROCEDURE — 6370000000 HC RX 637 (ALT 250 FOR IP): Performed by: PHYSICAL MEDICINE & REHABILITATION

## 2019-07-01 PROCEDURE — 97535 SELF CARE MNGMENT TRAINING: CPT

## 2019-07-01 PROCEDURE — 99231 SBSQ HOSP IP/OBS SF/LOW 25: CPT | Performed by: PHYSICAL MEDICINE & REHABILITATION

## 2019-07-01 RX ADMIN — ATORVASTATIN CALCIUM 40 MG: 40 TABLET, FILM COATED ORAL at 20:02

## 2019-07-01 RX ADMIN — LAMOTRIGINE 200 MG: 100 TABLET ORAL at 08:03

## 2019-07-01 RX ADMIN — LEVETIRACETAM 500 MG: 500 TABLET ORAL at 20:02

## 2019-07-01 RX ADMIN — LAMOTRIGINE 200 MG: 100 TABLET ORAL at 20:02

## 2019-07-01 RX ADMIN — LEVETIRACETAM 500 MG: 500 TABLET ORAL at 08:03

## 2019-07-01 RX ADMIN — ASPIRIN 81 MG 81 MG: 81 TABLET ORAL at 08:02

## 2019-07-01 RX ADMIN — CLOPIDOGREL 75 MG: 75 TABLET, FILM COATED ORAL at 08:03

## 2019-07-01 ASSESSMENT — PAIN SCALES - GENERAL
PAINLEVEL_OUTOF10: 0

## 2019-07-01 NOTE — PROGRESS NOTES
Occupational Therapy  OCCUPATIONAL THERAPY DAILY NOTE    Date:2019  Patient Name: Ilene Toth  MRN: [id number]  : 1945  Room: [address] [place], [place] [address]     Diagnosis: CVA  Additional Pertinent Hx: none on file  Referring Practitioner:   Precautions: falls, L sided weakness, holter monitor (L chest)    Functional Assessment:   Date Status AE  Comments   Feeding 19  Set up     Grooming 19  Mod I  Seated at sink pt brushed teeth, combed, dried and styled hair and applied lotion and deodorant. Bathing 19  Supervision LH hose  Shower stall bench Full shower completed seated with holter monitor covered with aqua guard. Min cues for safety required. UB Dressing 19  Independent  To don/doff bra, doff pull over shirt and don button up shirt seated in chair. LB Dressing 19  Supervision  To don/doff underwear, shorts and socks with min cues for safety required. Homemaking 19  CGA ww      Functional Transfers / Balance:   Date Status DME  Comments   Sit Balance 19  Independent   Demo'd during functional dynamic ADL tasks. Stand Balance 19  Supervision Tabletop  Ww, sink   counter Min cues for safety required during dynamic ADL tasks. [x] Tub      [x] Shower   Transfer 19  Min A      Supervision Tub seat      Shower seat, grab rails  To step in/out of tub. Pt's daughter in law assisted with advancing L LE in/out of tub. Min cues for safety. Commode   Transfer 19  Supervision Grab rail   3:1 device Min cues for safety required. Functional   Mobility 19  SBA/S ww Completed within pt's room/bathroom with min cues for walker safety. Cues for safety required x2 d/t pt attempting to abandon w/w when maneuvering in small spaces. Completed throughout rehab unit with w/w. Pt able to navigate to room utilizing signs on wall with min cues. Noted pt dragging L foot as pt fatigued in PM tx session. Min cues for safety and awareness provided.

## 2019-07-01 NOTE — PROGRESS NOTES
ascended and descended  12 steps with bilateral rail with cg/sba NT 12 steps with no rail with CGA >12 steps with one rail with sup >12 steps with one rail with mod I   Curb Step:   ascended and descended 4 inch step with no AD and cgA NT 7.5 inch step with [facility] with SBA 7.5 inch step with AAD and sup 7.5 inch step with AAD and mod I   Picking up object off the floor Picked up 2# with sba assist Picked up a 2# weight with SBA NT     BLE ROM WFL WFL      BLE Strength R LE: 4+/5  Left LE: hip 3+/5, knee 4-/5, ankle 3/5 R LE: 4+/5  Left LE: hip 3+/5, knee 4-/5, ankle 3/5      Balance  Sitting: sup  Standing: cg/sba without AD Sitting: Supervision  Standing: SBA with [facility]      Date Family Teach Completed TBA Daughter-in-law present 7/1      Is additional Family Teaching Needed? Y or N Y No      Hindering Progress Balance/coordination Balance and coordination deficits      PT recommended ELOS 7-10 days       Team's Discharge Plan        Therapist at Team Meeting          Therapeutic Exercise:   AM:   Ambulation in parallel bars forward<>backward x4 each with no UE support with SBA  Ambulation in parallel bars laterally x4 each direction with no UE support with SBA  3 QC step-overs x4 with no AD with Min A that improved to CGA  Ambulation: 2x200 feet with WW with SBA/Supervision  PM:   3x7.5 inch and 3x4 inch platform steps with [facility] with SBA  Stair negotiation: 4 steps with 1 rail with Supervision    Additional Comments: Emphasized balance activities during the morning PT session, the pt required some assistance when performing high level balance tasks on the Salazar Balance Assessment and her balance deficits are reflected in her score of 33/56 indicating the pt is a high risk for future falls. The pt's daughter-in-law was present for the afternoon session and performed hands-on guarding with the pt for all functional mobility.  The pt was also able to recover from a simulated fall to the floor and her and her daughter-in-law and easily  ( x ) 3 able to  slipper but needs supervision   (    ) 2 unable to  but reaches 2-5 cm(1-2 inches) from slipper and keeps balance  independently  (    ) 1 unable to  and needs supervision while trying  (    ) 0 unable to try/needs assist to keep from losing balance or falling    TURNING TO LOOK BEHIND OVER LEFT AND RIGHT SHOULDERS WHILE STANDING  INSTRUCTIONS: Turn to look directly behind you over toward the left shoulder. Repeat to the right. Examiner may pick an object to look at directly behind the subject to encourage a better twist turn. ( x ) 4 looks behind from both sides and weight shifts well  (    ) 3 looks behind one side only other side shows less weight shift  (    ) 2 turns sideways only but maintains balance  (    ) 1 needs supervision when turning  (    ) 0 needs assist to keep from losing balance or falling    TURN 360 DEGREES  INSTRUCTIONS: Turn completely around in a full [place]. Pause. Then turn a full [place] in the other direction. (    ) 4 able to turn 360 degrees safely in 4 seconds or less  (    ) 3 able to turn 360 degrees safely one side only 4 seconds or less  (    ) 2 able to turn 360 degrees safely but slowly  ( x ) 1 needs close supervision or verbal cuing  (    ) 0 needs assistance while turning    PLACE ALTERNATE FOOT ON STEP OR STOOL WHILE STANDING UNSUPPORTED  INSTRUCTIONS: Place each foot alternately on the step/stool. Continue until each foot has touch the step/stool four times. (    ) 4 able to stand independently and safely and complete 8 steps in 20 seconds  (    ) 3 able to stand independently and complete 8 steps in > 20 seconds  (    ) 2 able to complete 4 steps without aid with supervision  ( x ) 1 able to complete > 2 steps needs minimal assist  (    ) 0 needs assistance to keep from falling/unable to try    STANDING UNSUPPORTED ONE FOOT IN FRONT  INSTRUCTIONS: (DEMONSTRATE TO SUBJECT) Place one foot directly in front of the other.  If

## 2019-07-02 PROCEDURE — 97535 SELF CARE MNGMENT TRAINING: CPT

## 2019-07-02 PROCEDURE — 97530 THERAPEUTIC ACTIVITIES: CPT

## 2019-07-02 PROCEDURE — 6370000000 HC RX 637 (ALT 250 FOR IP): Performed by: PHYSICAL MEDICINE & REHABILITATION

## 2019-07-02 PROCEDURE — 97110 THERAPEUTIC EXERCISES: CPT

## 2019-07-02 PROCEDURE — 97127 HC SP THER IVNTJ W/FOCUS COG FUNCJ: CPT

## 2019-07-02 PROCEDURE — 1280000000 HC REHAB R&B

## 2019-07-02 PROCEDURE — 99232 SBSQ HOSP IP/OBS MODERATE 35: CPT | Performed by: PHYSICAL MEDICINE & REHABILITATION

## 2019-07-02 RX ORDER — CLOPIDOGREL BISULFATE 75 MG/1
75 TABLET ORAL DAILY
Qty: 30 TABLET | Refills: 1 | Status: SHIPPED | OUTPATIENT
Start: 2019-07-02 | End: 2019-10-08 | Stop reason: SDUPTHER

## 2019-07-02 RX ORDER — ASPIRIN 81 MG/1
81 TABLET, CHEWABLE ORAL DAILY
Qty: 90 TABLET | Refills: 0 | Status: SHIPPED | OUTPATIENT
Start: 2019-07-02

## 2019-07-02 RX ORDER — ATORVASTATIN CALCIUM 40 MG/1
40 TABLET, FILM COATED ORAL NIGHTLY
Qty: 30 TABLET | Refills: 1 | Status: SHIPPED | OUTPATIENT
Start: 2019-07-02 | End: 2019-10-08 | Stop reason: SDUPTHER

## 2019-07-02 RX ADMIN — LEVETIRACETAM 500 MG: 500 TABLET ORAL at 20:37

## 2019-07-02 RX ADMIN — ASPIRIN 81 MG 81 MG: 81 TABLET ORAL at 08:15

## 2019-07-02 RX ADMIN — LAMOTRIGINE 200 MG: 100 TABLET ORAL at 08:14

## 2019-07-02 RX ADMIN — CLOPIDOGREL 75 MG: 75 TABLET, FILM COATED ORAL at 08:14

## 2019-07-02 RX ADMIN — LEVETIRACETAM 500 MG: 500 TABLET ORAL at 08:14

## 2019-07-02 RX ADMIN — ATORVASTATIN CALCIUM 40 MG: 40 TABLET, FILM COATED ORAL at 20:37

## 2019-07-02 RX ADMIN — LAMOTRIGINE 200 MG: 100 TABLET ORAL at 20:37

## 2019-07-02 ASSESSMENT — PAIN SCALES - GENERAL
PAINLEVEL_OUTOF10: 0

## 2019-07-02 NOTE — PROGRESS NOTES
Occupational Therapy     07/02/19 1409   Attendance   Activity Games   Participation Active participation   [place] Demonstrates ability to complete social goals   Social Skills Interacts independently in social activity   Leisure Education Demonstrates knowledge of benefits of leisure involvement  (Identified enjoyment and getting to know people as benefits.)   Time Spent With Patient   Minutes 35

## 2019-07-02 NOTE — PATIENT CARE CONFERENCE
independent    Expression:   Current level: Modified independent  Short term expression goal: Modified independent  Long term expression goal: Modified independent    Problem solving:   Current level: min assist  Short term problem solving goal: supervision  Long term problem solving goal: Modified independent    Memory:  Current level: min assist  Short term memory goal: supervision  Long term memory goal: Modified independent    Social interaction:  Modified independent    Safety awareness: fair -      Patient/family's personal goals: \"get home\"  Factors supporting goal achievement:  making gains, supportive family  Factors hindering goal achievement:  poor carryover and safety      Discharge Plan   Estimated Length of Stay: 7/3            Destination: home  Services at Discharge: outpatient PT, OT, speech  Equipment at Discharge: wheeled walker      INTERDISCIPLINARY TEAM/PHYSICIAN [address]:  finalize discharge for 7/3/19 to son's home      I approve the established interdisciplinary plan of care as documented within the medical record of Annabel Rodriguez. Please see team conference signature page for those in attendance.     Electronically signed by Mulu Varela RN  on 7/2/2019 at 10:03 AM

## 2019-07-02 NOTE — PROGRESS NOTES
HCT 40.1 06/27/2019    MCV 91.8 06/27/2019     06/27/2019     Lab Results   Component Value Date     06/27/2019    K 4.1 06/27/2019     06/27/2019    CO2 27 06/27/2019    BUN 14 06/27/2019    CREATININE 0.7 06/27/2019    GLUCOSE 86 06/27/2019    CALCIUM 9.5 06/27/2019          Functional Status:   Grooming: Set up  UB dressing: Set up  LB dressing: SBA  Bed mobility: Mod I  Transfers: Supervision  Ambulation: 400 ft [employer] Supervision       Assessment/Plan:       69 y.o. 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 14 day Holter monitor (L chest) in place. Continue Aspirin, Plavix, Lipitor. Continue acute rehab program.   -History of seizure disorder post meningioma resection in 2001: continue Keppra, Lamictal--home meds    Team conference today  Working on balance  Anticipate discharge home with family tomorrow. Family teaching completed.        Electronically signed by Lori Weinberg MD on 7/2/2019 at 10:34 AM

## 2019-07-02 NOTE — PROGRESS NOTES
Occupational Therapy  OCCUPATIONAL THERAPY DAILY NOTE    Date:2019  Patient Name: Pam Nelson  MRN: [id number]  : 1945  Room: [address] [place], [place] [address]     Diagnosis: CVA  Additional Pertinent Hx: none on file  Referring Practitioner:   Precautions: falls, L sided weakness, holter monitor (L chest)    Functional Assessment:   Date Status AE  Comments   Feeding 19  Set up     Grooming 19  Mod I     Bathing 19  Supervision LH hose  Shower stall bench    UB Dressing 19  Independent     LB Dressing 19  Supervision     Homemaking 19  SBA ww and basket Pt completed laundry folding seated in arm chair at table. Pt then transported linens over to closet using w.w with basket attached needing vc's to stay inside device not step around walker. Pt able to stack  washcloths and towels on shelf using walker for balance. Functional Transfers / Balance:   Date Status DME  Comments   Sit Balance 19  Independent  Arm chair    Stand Balance 19  Supervision Tabletop  Ww, sink   counter Demo'd during therapy activities standing at tabletop. PM: Standing balance during item transport using ww for hmkg. [x] Tub      [x] Shower   Transfer 19  Min A      Supervision Tub seat      Shower seat, grab rails     Commode   Transfer 19  Supervision Grab rail   3:1 device V/c's for walker safety. Functional   Mobility 19  SBA/S ww Occasional v/c's for walker management. Pt completed around gym and unit   Other:on/off standard queen bed and kitchen chair without arm rests  19  Supervision ww      Functional Exercises / Activity:  Pt participated in leisure therapy activity with focus on balance and endurance with BUE's integrated in [address] bCODE activity. Pt tolerated well. Standing balance: Supervision, Endurance: pt standing for 35 minutes without seated rest break.    PM: Pt engaged in hmkg/laundry folding while seated wearing BUE 1# wrist wts to improve Training, Equipment Evaluation, Education, & procurement, Balance Training, Neuromuscular Re-education, Safety Education & Training, Self-Care / ADL    [x] Continue with current OT Plan of care. [] Prepare for Discharge     DISCHARGE RECOMMENDATIONS  Recommended DME: tub bench without back, w/w    Post Discharge Care:   []Home Independently  []Home with 24hr Care / Supervision [x]Home with Partial Supervision []Home with Home Health OT []Home with Out Pt OT []Other: ___   Comments:         Time in Time out Tx Time Breakdown  Variance:   First Session  10:00 10:45 [x] Individual Tx- 45 Mins  [] Concurrent Tx -  [] Co-Tx -   [] Group Tx -   [] Time Missed -     Second Session 13:45pm 14:30pm [] Individual Tx-   [x] Concurrent Tx -45min  [] Co-Tx -   [] Group Tx -   [] Time Missed -     Third Session    [] Individual Tx-   [] Concurrent Tx -  [] Co-Tx -   [] Group Tx -   [] Time Missed -         Total Tx Time: 90 Mins    [facility] MILLS/L [address], [facility]      I have read & agree with the above status.     Sigifredo De Souza OTR/L [id number]

## 2019-07-03 VITALS
TEMPERATURE: 98.3 F | DIASTOLIC BLOOD PRESSURE: 84 MMHG | BODY MASS INDEX: 22.84 KG/M2 | WEIGHT: 124.13 LBS | RESPIRATION RATE: 18 BRPM | HEIGHT: 62 IN | OXYGEN SATURATION: 94 % | HEART RATE: 80 BPM | SYSTOLIC BLOOD PRESSURE: 127 MMHG

## 2019-07-03 PROCEDURE — 97530 THERAPEUTIC ACTIVITIES: CPT

## 2019-07-03 PROCEDURE — 99239 HOSP IP/OBS DSCHRG MGMT >30: CPT | Performed by: PHYSICAL MEDICINE & REHABILITATION

## 2019-07-03 PROCEDURE — 97110 THERAPEUTIC EXERCISES: CPT

## 2019-07-03 PROCEDURE — 6370000000 HC RX 637 (ALT 250 FOR IP): Performed by: PHYSICAL MEDICINE & REHABILITATION

## 2019-07-03 RX ADMIN — LAMOTRIGINE 200 MG: 100 TABLET ORAL at 09:06

## 2019-07-03 RX ADMIN — LEVETIRACETAM 500 MG: 500 TABLET ORAL at 09:06

## 2019-07-03 RX ADMIN — ASPIRIN 81 MG 81 MG: 81 TABLET ORAL at 09:06

## 2019-07-03 RX ADMIN — CLOPIDOGREL 75 MG: 75 TABLET, FILM COATED ORAL at 09:06

## 2019-07-03 ASSESSMENT — PAIN SCALES - GENERAL
PAINLEVEL_OUTOF10: 0
PAINLEVEL_OUTOF10: 0

## 2019-07-03 NOTE — PROGRESS NOTES
Occupational Therapy  DISCHARGE SUMMARY    Group interaction skills/socialization:  Jorge Alberto Hardy displayed social interaction skills at the complete independence. Leisure participation/awareness:  Jorge Alberto Hardy participated in 3 therapeutic recreation interventions identifying 5 benefits to leisure participation.     Other:     Outcomes: goals achieved      Electronically signed by Althea Mata on 7/3/2019 at 12:13 PM

## 2019-07-03 NOTE — PROGRESS NOTES
Pt d/c to home with daughter in law. D/c instructions explained and given to pt. No questions voiced. Meds transferred to [facility] in [place].

## 2019-07-03 NOTE — DISCHARGE SUMMARY
[address] [facility] Physical Medicine and Rehabilitation  Discharge Summary    Date of Original Admission:6/21/2019    Date of Discharge: 7/3/2019    Primary Care Physician:Brock Coughlin     Attending Physician: Noam Duval MD  Primary Service:  Acute Inpatient Rehabilitation     Primary Diagnosis: Acute right mid pontine CVA  Secondary Diagnosis/es: left hemiparesis, dysarthria, history of seizure disorder  Consults:   None  Procedures:  None  Significant Radiologic Results:   None    Discharge Disposition:  Home with family  Condition on Discharge:  Stable, functionally improved  ===================================================================  History of Present Illness:     68 y/o female with history of meningioma resection at [facility] in [place] in 2001. She did have history of seizures and is on Keppra and Lamictal for those. Recent admission was due to sudden onset of left sided weakness. She went initially to [facility] and then was transferred back to [facility] where her neurologist is. MRI confirmed a new CVA in the right hemisphere. Workup was completed and 14 day holter monitor was placed. She was felt to be a good candidate for further rehab and was then admitted to [address] [place] [address] Acute rehab program.    ===================================================================    Functional Status        Initial Evaluation  6/22/19 AM    7/2/19 PM 7/2/19   Was pt agreeable to Eval/treatment? yes Yes Yes    Does pt have pain?  No pain No None    Bed Mobility  Rolling: sba  Supine to sit: sba  Sit to supine: sba  Scooting: sba Mod I NT   Transfers Sit to stand: sba  Stand to sit: sba  Stand pivot: sba without AD Sit to stand: supervision   Stand to [address] to stand supervision   Stand to sit supervision   Ambulation    200 feet with cg/sba with no  feet x2 with ww with close supervision  200 feet x2 with ww with supervision    Car Transfers sba SBA      Stair PM&R clinics to review ongoing rehab needs.    -Acute right mid pontine CVA: left hemiparesis, cognitive/linguistic deficits. 14 day Holter monitor (L chest) in place. Continued Aspirin, Plavix, Lipitor. Patient completed acute rehab program with functional progress as above.   -History of seizure disorder post meningioma resection in 2001: continued home Keppra and Lamictal       ==================================================================  Discharge Medications     Medication List      CHANGE how you take these medications    atorvastatin 40 MG tablet  Commonly known as:  LIPITOR  Take 1 tablet by mouth nightly  What changed:  when to take this        CONTINUE taking these medications    aspirin 81 MG chewable tablet  Take 1 tablet by mouth daily     clopidogrel 75 MG tablet  Commonly known as:  PLAVIX  Take 1 tablet by mouth daily     lamoTRIgine 200 MG tablet  Commonly known as:  LAMICTAL     levETIRAcetam 500 MG tablet  Commonly known as:  KEPPRA           Where to Get Your Medications      These medications were sent to [facility] [address] [address]    Phone:  [phone]   · aspirin 81 MG chewable tablet  · atorvastatin 40 MG tablet  · clopidogrel 75 MG tablet         Allergies  Patient has no known allergies. Recommended Therapies at Discharge: Outpatient PT, OT, and Speech therapy      Follow-Up  -Primary care - Dr. Adin Jordan  -Neurology - Dr. Desi Steel  -Follow up with Cardiology at outside hospital as instructed  -PM&R - Dr. Ryan Dias provided to the patient and appropriate family members regarding discharge medications and follow up     More than 30 minutes was spent in preparation of this patient's discharge including, but not limited to, examination, preparation of documents, prescription preparation, counseling and coordination.       Electronically signed by Sharon Posadas MD on

## 2019-07-03 NOTE — PROGRESS NOTES
Occupational Therapy     07/03/19 1211   Attendance   Activity Puzzles   Participation Active participation   [address] [address] Reintegration Demonstrates ability to complete social goals   Social Skills Interacts independently in social activity   Leisure Education Demonstrates knowledge of benefits of leisure involvement  (Identified fun and concentration as benefits.)   Time Spent With Patient   Minutes 25

## 2019-07-10 ENCOUNTER — HOSPITAL ENCOUNTER (OUTPATIENT)
Dept: PHYSICAL THERAPY | Age: 74
Setting detail: THERAPIES SERIES
Discharge: HOME | End: 2019-07-10
Payer: MEDICARE

## 2019-07-10 ENCOUNTER — HOSPITAL ENCOUNTER (OUTPATIENT)
Dept: SPEECH THERAPY | Age: 74
Setting detail: THERAPIES SERIES
Discharge: HOME | End: 2019-07-10
Payer: MEDICARE

## 2019-07-10 ENCOUNTER — HOSPITAL ENCOUNTER (OUTPATIENT)
Dept: OCCUPATIONAL THERAPY | Age: 74
Setting detail: THERAPIES SERIES
Discharge: HOME | End: 2019-07-10
Payer: MEDICARE

## 2019-07-10 PROCEDURE — 92507 TX SP LANG VOICE COMM INDIV: CPT | Performed by: SPEECH-LANGUAGE PATHOLOGIST

## 2019-07-10 PROCEDURE — 97110 THERAPEUTIC EXERCISES: CPT | Performed by: PHYSICAL THERAPIST

## 2019-07-10 PROCEDURE — 97165 OT EVAL LOW COMPLEX 30 MIN: CPT | Performed by: OCCUPATIONAL THERAPIST

## 2019-07-10 PROCEDURE — 96125 COGNITIVE TEST BY HC PRO: CPT | Performed by: SPEECH-LANGUAGE PATHOLOGIST

## 2019-07-10 PROCEDURE — 97530 THERAPEUTIC ACTIVITIES: CPT | Performed by: OCCUPATIONAL THERAPIST

## 2019-07-10 PROCEDURE — 97162 PT EVAL MOD COMPLEX 30 MIN: CPT | Performed by: PHYSICAL THERAPIST

## 2019-07-10 NOTE — PROGRESS NOTES
SPEECH/LANGUAGE PATHOLOGY  COGNITIVE EVALUATION     PATIENT NAME:  Radha Landry      :  1945      TODAY'S DATE:  7/10/2019      SPEECH PATHOLOGY DIAGNOSIS:  Mild dysarthria when fatigued, pt 100% intelligible to the unknown listener; Mild immediate memory deficit possible related to attention    THERAPY RECOMMENDATIONS:      [x] Individual Cognitive therapy is not warranted at this time. Discussed option of individual ST focusing on deficits or OT addressing mild deficit during OT session. Pt chose to complete PT/OT only at this time, will contact SLP if decides to complete individual ST.     [x]Cognitive therapy is recommended to be completed during OT with emphasis on the following:   [x]To improve immediate memory   []To improve recent memory   []To improve temporal orientation (recent memory)  []To improve temporal orientation (remote memory)   []To improve spatial orientation  []To improve orientation to environment  []To improve recall of general information  []To improve reasoning   []To improve problem solving   []To improve organization  []To improve auditory processing and retention                    Stimulus questions were obtained from the RIPA-2.   Severity ratings for each subtest  were determined as follows:  RAW SCORE SEVERITY   28-30 Within functional limits   25-27 Mild deficit   22-24 Moderate deficit   19-21 Marked deficit   16-18 Severe deficit                         SUBTEST SCORES  Subtest Raw Score   Severity   Immediate Memory   /30 mild   Recent Memory    wfl   Temporal Orientation  (Recent Memory)  wfl   Temporal Orientation  (Remote Memory)  wfl   Spatial Orientation    wfl   Orientation to Environment  wfl        Problem Solving & Abstract Reasoning  wfl          Auditory Processing   & Retention  wfl                                                     VARIANT OBSERVATIONS   Present Absent   Error (e)     Perseveration (p)     Repeat

## 2019-07-10 NOTE — PROGRESS NOTES
Physical Therapy  Initial Assessment  Date: 7/10/2019  Patient Name: Eder Wright  MRN: [id number]  : 1945     Subjective   General  Additional Pertinent Hx: Pt admitted to the hospital on 19 due to left sided weakness, was found to have a CVA in the right hemisphere. Pt with a h/o meningioma resection in  with seizures. Referring Practitioner: Anand Romero MD  Referral Date : 19  Diagnosis: Stroke  PT Visit Information  Onset Date: 19  PT Insurance Information: [employer]  Subjective  Subjective: Pt's primary complaint is left sided weakness. Pt denies any pain, numbness, or tingling. Pain Screening  Patient Currently in Pain: No  Vital Signs  Patient Currently in Pain: No      Social/Functional History  Social/Functional History  Lives With: Family  Type of Home: (Apartment in the basement of son's house)  Home Layout: One level  Home Access: Stairs to enter with rails  Entrance Stairs - Number of Steps: 12  Entrance Stairs - Rails: Both  Home Equipment: Rolling walker  Active : No(Pt was driving prior to CVA)  Occupation: Retired    Objective       AROM RLE (degrees)  RLE AROM: WNL  AROM LLE (degrees)  LLE AROM : WNL    Strength RLE  Strength RLE: WNL  Strength LLE  Strength LLE: Exception  Comment: Hip 3/5 throughout, ankle DF 4/5  Tone RLE  RLE Tone: Normotonic  Tone LLE  LLE Tone: Normotonic  Motor Control  Gross Motor?: Exceptions  Comments: Very mild delay noted in L LE with coordination movements      Transfers  Sit to Stand: Independent  Stand to sit: Independent       Ambulation  Ambulation?: Yes  More Ambulation?: Yes  Ambulation 1  Device: No Device  Assistance: Stand by assistance;Contact guard assistance  Quality of Gait: Pt demonstrates a mild increased swing time on the L LE, occasional difficulty clearing obstacles in path.   Ambulation 2  Device 2: [address] [facility] 2: Supervision  Comments: Frequent cuing for proper use of wheeled walker due to pt don't hesitate to call.   Thank you for your referral.    Physician Signature:________________________________Date:__________________  By signing above, therapists plan is approved by physician

## 2019-07-12 ENCOUNTER — HOSPITAL ENCOUNTER (OUTPATIENT)
Dept: PHYSICAL THERAPY | Age: 74
Setting detail: THERAPIES SERIES
Discharge: HOME | End: 2019-07-12
Payer: MEDICARE

## 2019-07-12 PROCEDURE — 97110 THERAPEUTIC EXERCISES: CPT

## 2019-07-17 ENCOUNTER — HOSPITAL ENCOUNTER (OUTPATIENT)
Dept: PHYSICAL THERAPY | Age: 74
Setting detail: THERAPIES SERIES
Discharge: HOME | End: 2019-07-17
Payer: MEDICARE

## 2019-07-17 ENCOUNTER — HOSPITAL ENCOUNTER (OUTPATIENT)
Dept: OCCUPATIONAL THERAPY | Age: 74
Setting detail: THERAPIES SERIES
Discharge: HOME | End: 2019-07-17
Payer: MEDICARE

## 2019-07-17 PROCEDURE — 97530 THERAPEUTIC ACTIVITIES: CPT | Performed by: OCCUPATIONAL THERAPIST

## 2019-07-17 PROCEDURE — 97530 THERAPEUTIC ACTIVITIES: CPT | Performed by: PHYSICAL THERAPIST

## 2019-07-17 PROCEDURE — 97110 THERAPEUTIC EXERCISES: CPT | Performed by: PHYSICAL THERAPIST

## 2019-07-17 PROCEDURE — 97110 THERAPEUTIC EXERCISES: CPT | Performed by: OCCUPATIONAL THERAPIST

## 2019-07-17 PROCEDURE — 97112 NEUROMUSCULAR REEDUCATION: CPT | Performed by: PHYSICAL THERAPIST

## 2019-07-17 NOTE — PROGRESS NOTES
3     -Response to Treatment: Pt tolerated session well  Goals: Goals for pt can be see on initial eval occurring on 7/10/2019    Plan:   [x]  Continues Plan of care: Treatment covered based on POC and graduated to patient's progress. Pt education continues at each visit to obtain maximum benefits from skilled OT intervention.   []  Alter Plan of care:   []  Discharge:      Mickel Lombard MOT, OTR/L [id number]

## 2019-07-22 ENCOUNTER — HOSPITAL ENCOUNTER (OUTPATIENT)
Dept: PHYSICAL THERAPY | Age: 74
Setting detail: THERAPIES SERIES
Discharge: HOME | End: 2019-07-22
Payer: MEDICARE

## 2019-07-22 ENCOUNTER — HOSPITAL ENCOUNTER (OUTPATIENT)
Dept: OCCUPATIONAL THERAPY | Age: 74
Setting detail: THERAPIES SERIES
Discharge: HOME | End: 2019-07-22
Payer: MEDICARE

## 2019-07-22 PROCEDURE — 97535 SELF CARE MNGMENT TRAINING: CPT | Performed by: OCCUPATIONAL THERAPIST

## 2019-07-22 PROCEDURE — 97110 THERAPEUTIC EXERCISES: CPT | Performed by: OCCUPATIONAL THERAPIST

## 2019-07-22 PROCEDURE — 97110 THERAPEUTIC EXERCISES: CPT

## 2019-07-22 NOTE — PROGRESS NOTES
[address] [place] [address] JOSE Zamudio      Phone: [phone]  Fax: [phone]    Physical Therapy Daily Treatment Note  Date:  2019    Patient Name:  Jose Armando Ramirez    :  1945  MRN: [id number]    Restrictions/Precautions:    Diagnosis:  Stroke  Treatment Diagnosis:    Insurance/Certification information:  [employer]  Referring Physician:  Coleridge Denver, MD  Plan of care signed (Y/N):    Visit# / total visits:  4/10-  Pain level: 0/10   Time In:  0900  Time Out:  09    Subjective:  Pt continues to report having and also had during session tearful episodes without provocation. Pt reported that this is very disruptive to her days and is concerning to her. Again encouraged pt to contact her physician regarding this , as it is a probable side effect from her CVA , and can be managed . Pt also reported that yesterday , she fell 2x , d/t L LE giving out. She was amb in home without AD and reported \" I was too tired, when it happened. \" she did not feel that she was injured , but reported bruising of L hip, significant as she takes blood thinners. Therapist instructed pt to be more mindful of her levels of fatigue and at those times to use the ww in the home, and to continue to use ww in the community as she has been previously instructed. Pt reported understanding. Pt's Son present and he reported understanding . Pt also reported performing dishwashing @ the sink. Instructed pt to have chair nearby , for her to sit in if she becomes fatigued standing at the sink.  Both reported understanding     Exercises:  Exercise/Equipment Resistance/Repetitions Other comments     Recumbent bike 10 minutes Level 2     SAQ 2# 2x10 reps      SLR 2# 2x10 reps       bridges 2 x 10 reps       Sit to stand 2 x 10 reps       Standing hip abd, ext, SLR 2X 10 reps   B             Tandem stance/gait   Gait  3 laps II bars      Gait around/over obstacles X 5 laps each Emphasis on obstacle awareness

## 2019-07-24 ENCOUNTER — HOSPITAL ENCOUNTER (OUTPATIENT)
Dept: PHYSICAL THERAPY | Age: 74
Setting detail: THERAPIES SERIES
Discharge: HOME | End: 2019-07-24
Payer: MEDICARE

## 2019-07-24 ENCOUNTER — HOSPITAL ENCOUNTER (OUTPATIENT)
Dept: OCCUPATIONAL THERAPY | Age: 74
Setting detail: THERAPIES SERIES
Discharge: HOME | End: 2019-07-24
Payer: MEDICARE

## 2019-07-24 PROCEDURE — 97530 THERAPEUTIC ACTIVITIES: CPT | Performed by: PHYSICAL THERAPIST

## 2019-07-24 PROCEDURE — 97535 SELF CARE MNGMENT TRAINING: CPT | Performed by: OCCUPATIONAL THERAPIST

## 2019-07-24 PROCEDURE — 97112 NEUROMUSCULAR REEDUCATION: CPT | Performed by: PHYSICAL THERAPIST

## 2019-07-24 PROCEDURE — 97110 THERAPEUTIC EXERCISES: CPT | Performed by: OCCUPATIONAL THERAPIST

## 2019-07-24 PROCEDURE — 97110 THERAPEUTIC EXERCISES: CPT | Performed by: PHYSICAL THERAPIST

## 2019-07-24 NOTE — PROGRESS NOTES
ADL/Home Mgt  20 1   Neuro Re-education     Gait Training     Group Therapy     Non-Billable Service Time     Other     Total Time/Units 60 4     -Response to Treatment: Pt tolerated session well  Goals: Goals for pt can be see on initial eval occurring on 7/10/2019    Plan:   [x]  Continues Plan of care: Treatment covered based on POC and graduated to patient's progress. Pt education continues at each visit to obtain maximum benefits from skilled OT intervention.   []  Alter Plan of care:   []  Discharge:      Randy JAMES, OTR/L [id number]

## 2019-07-24 NOTE — PROGRESS NOTES
[address] [place] [address] JOSE Zamudio      Phone: [phone]  Fax: [phone]    Physical Therapy Daily Treatment Note  Date:  2019    Patient Name:  Crystal Figueroa    :  1945  MRN: [id number]    Restrictions/Precautions:    Diagnosis:  Stroke  Treatment Diagnosis:    Insurance/Certification information:  [employer]  Referring Physician:  Darren Cordero MD  Plan of care signed (Y/N):    Visit# / total visits:  5/10-  Pain level: 0/10   Time In:  0900  Time Out:  955    Subjective:  Pt denies any falls since last visit. Exercises:  Exercise/Equipment Resistance/Repetitions Other comments     Recumbent bike 10 minutes Level 2     SAQ 2# 2x10 reps      SLR 2# 2x10 reps       bridges 2 x 10 reps       Sit to stand 2 x 10 reps       Standing hip abd, ext, SLR 2X 10 reps   B             Tandem stance/gait   Gait  3 laps II bars      Gait around/over obstacles X 3 laps each Emphasis on obstacle awareness and body position related to object, safety        Standing ball toss x3-4 minutes with basketball Attempted with weighted ball and trampoline, however,pt having difficulty catching ball due to decreased response time   Gait while reading card    x2 laps No LOB noted    Step-ups fwd/ sideways  6\" step 2 x 10 reps B fwd  x10 reps B sideways      Total gym x20 reps Assist from therapist to control snapping of left knee with extension                                 Other Therapeutic Activities:  NA    Home Exercise Program:  19 - pt cleared to ambulate without device in home, however, instructed to continue to use wheeled walker in community.     Manual Treatments:  N/A    Modalities:  N/A    Timed Code Treatment Minutes:  55    Total Treatment Minutes:  55    Treatment/Activity Tolerance:  [x] Patient tolerated treatment well [] Patient limited by fatigue  [] Patient limited by pain  [] Patient limited by other medical complications  [] Other:      Prognosis: [x] Good [] Fair  [] Poor    Patient Requires Follow-up: [x] Yes  [] No    Plan:   [x] Continue per plan of care [] Alter current plan (see comments)  [] Plan of care initiated [] Hold pending MD visit [] Discharge  Plan for Next Session:        Electronically signed by:  Mor Meredith, PT [id number]

## 2019-07-29 ENCOUNTER — HOSPITAL ENCOUNTER (OUTPATIENT)
Dept: OCCUPATIONAL THERAPY | Age: 74
Setting detail: THERAPIES SERIES
Discharge: HOME | End: 2019-07-29
Payer: MEDICARE

## 2019-07-29 ENCOUNTER — HOSPITAL ENCOUNTER (OUTPATIENT)
Dept: PHYSICAL THERAPY | Age: 74
Setting detail: THERAPIES SERIES
Discharge: HOME | End: 2019-07-29
Payer: MEDICARE

## 2019-07-29 PROCEDURE — 97110 THERAPEUTIC EXERCISES: CPT | Performed by: OCCUPATIONAL THERAPIST

## 2019-07-29 PROCEDURE — 97110 THERAPEUTIC EXERCISES: CPT

## 2019-07-30 ENCOUNTER — TELEPHONE (OUTPATIENT)
Dept: ADMINISTRATIVE | Age: 74
End: 2019-07-30

## 2019-07-31 ENCOUNTER — HOSPITAL ENCOUNTER (OUTPATIENT)
Dept: PHYSICAL THERAPY | Age: 74
Setting detail: THERAPIES SERIES
Discharge: HOME | End: 2019-07-31
Payer: MEDICARE

## 2019-07-31 ENCOUNTER — HOSPITAL ENCOUNTER (OUTPATIENT)
Dept: OCCUPATIONAL THERAPY | Age: 74
Setting detail: THERAPIES SERIES
Discharge: HOME | End: 2019-07-31
Payer: MEDICARE

## 2019-07-31 PROCEDURE — 97110 THERAPEUTIC EXERCISES: CPT | Performed by: PHYSICAL THERAPIST

## 2019-07-31 PROCEDURE — 97112 NEUROMUSCULAR REEDUCATION: CPT | Performed by: PHYSICAL THERAPIST

## 2019-07-31 PROCEDURE — 97110 THERAPEUTIC EXERCISES: CPT | Performed by: OCCUPATIONAL THERAPIST

## 2019-07-31 PROCEDURE — 97530 THERAPEUTIC ACTIVITIES: CPT | Performed by: OCCUPATIONAL THERAPIST

## 2019-07-31 NOTE — PROGRESS NOTES
Continue per plan of care [] Alter current plan (see comments)  [] Plan of care initiated [] Hold pending MD visit [] Discharge  Plan for Next Session:        Electronically signed by:  Ivana Persaud, PT [id number]

## 2019-07-31 NOTE — PROGRESS NOTES
OCCUPATIONAL THERAPY PROGRESS NOTE    Date:  2019  Initial Evaluation Date: 7-10-19     Patient Name:  Brianna Chapa                         :  1945     Restrictions/Precautions:  Seizure, Moderate fall risk  Diagnosis:  CVA w/ L sided weakness;acute ischemic infarct involving right parasagittal mid gio                                                           Insurance/Certification information:  Rosanne Erazo  Referring Physician:  Dr. Abby Goyal  Date of Event: 19  Plan of care signed (Y/N):  N  Visit# / total visits:      Pain Level: no c/o pain today    Subjective: Pt happy she is completing some tasks at home such as dishes. She is hoping to improve her abilities so that she can complete more homemaking tasks. Objective: Initiated session with UBE including feet option x 10 mins on level 3. She then completed FM tasks including threading buttons, place pegs in putty using tweezers, and manipulating golf balls in her hand. Pt the completed strengthening using 3# straight wt to complete modified chest press,  press, large circles, and horizontal AD/ABduction x 2 sets of 15 reps each. Finished session using green     Assessment/Comments: Pt is making Good progress toward stated plan of care. -Rehab Potential: Good  -Requires OT Follow Up: Yes  Time In: 10:00 am            Time Out: 11:01 am             Visit #: 6    Treatment Charges: Mins Units   Modalities     Ther Exercise 30 3   Manual Therapy     Thera Activities 15 1   ADL/Home Mgt      Neuro Re-education     Gait Training     Group Therapy     Non-Billable Service Time 15 0   Other     Total Time/Units 45 3     -Response to Treatment: Pt tolerated session well  Goals: Goals for pt can be see on initial eval occurring on 7/10/2019    Plan:   [x]  Continues Plan of care: Treatment covered based on POC and graduated to patient's progress.  Pt education continues at each visit to obtain maximum benefits from skilled OT intervention.   []  Alter Plan of care:   []  Discharge:      Mikayla Nixon [place], OTR/L [id number]

## 2019-08-05 ENCOUNTER — HOSPITAL ENCOUNTER (OUTPATIENT)
Dept: PHYSICAL THERAPY | Age: 74
Setting detail: THERAPIES SERIES
Discharge: HOME | End: 2019-08-05
Payer: MEDICARE

## 2019-08-05 ENCOUNTER — HOSPITAL ENCOUNTER (OUTPATIENT)
Dept: OCCUPATIONAL THERAPY | Age: 74
Setting detail: THERAPIES SERIES
Discharge: HOME | End: 2019-08-05
Payer: MEDICARE

## 2019-08-05 PROCEDURE — 97530 THERAPEUTIC ACTIVITIES: CPT | Performed by: OCCUPATIONAL THERAPIST

## 2019-08-05 PROCEDURE — 97110 THERAPEUTIC EXERCISES: CPT

## 2019-08-05 PROCEDURE — 97110 THERAPEUTIC EXERCISES: CPT | Performed by: OCCUPATIONAL THERAPIST

## 2019-08-05 NOTE — PROGRESS NOTES
[address] [place] [address] JOSE Zamudio      Phone: [phone]  Fax: [phone]    Physical Therapy Daily Treatment Note  Date:  2019    Patient Name:  Naomi Coreas    :  1945  MRN: [id number]    Restrictions/Precautions:    Diagnosis:  Stroke  Treatment Diagnosis:    Insurance/Certification information:  [employer]  Referring Physician:  Nicko Hameed MD  Plan of care signed (Y/N):    Visit# / total visits:  8/10-  Pain level: 0/10   Time In:  843  Time Out:  927    Subjective:  Pt without complaint    Exercises:  Exercise/Equipment Resistance/Repetitions Other comments     Recumbent bike 10 minutes Level 2      SAQ 2.5# 2x10 reps      SLR 2.5# 2x10 reps       bridges Double leg 2 x 10 reps  Single leg x 10 reps       Sit to stand 2 x 10 reps       Standing hip abd, ext, SLR 1.5# 2 x 10 reps   B             Tandem stance/gait   Gait  3 laps II bars      Gait around/over obstacles X 3 laps each Emphasis on obstacle awareness and body position related to object, safety        Standing ball toss x3-4 minutes with basketball NT   Gait while reading card    x2 laps     Step-ups fwd/ sideways  6\" step 2 x 10 reps B fwd  x10 reps B sideways      Total gym x20 reps Assist from therapist to control snapping of left knee with extension                                   Other Therapeutic Activities:  NA    Home Exercise Program:  19 - pt cleared to ambulate without device in home, however, instructed to continue to use wheeled walker in community.     Manual Treatments:  N/A    Modalities:  N/A    Timed Code Treatment Minutes:  44    Total Treatment Minutes:  44    Treatment/Activity Tolerance:  [x] Patient tolerated treatment well [] Patient limited by fatigue  [] Patient limited by pain  [] Patient limited by other medical complications  [] Other:      Prognosis: [x] Good [] Fair  [] Poor    Patient Requires Follow-up: [x] Yes  [] No    Plan:   [x] Continue per plan of care [] Alter current plan (see comments)  [] Plan of care initiated [] Hold pending MD visit [] Discharge  Plan for Next Session:        Electronically signed by:  Erin Gamboa, PTA [id number]

## 2019-08-05 NOTE — PROGRESS NOTES
OCCUPATIONAL THERAPY PROGRESS NOTE    Date:  2019  Initial Evaluation Date: 7-10-19     Patient Name:  Olamide Vargas                         :  1945     Restrictions/Precautions:  Seizure, Moderate fall risk  Diagnosis:  CVA w/ L sided weakness;acute ischemic infarct involving right parasagittal mid gio                                                           Insurance/Certification information:  Rosanne Erazo  Referring Physician:  Dr. Rene Lee  Date of Event: 19  Plan of care signed (Y/N):  N  Visit# / total visits:      Pain Level: no c/o pain today    Subjective: Pt reports perfoming more IADL's at home. Objective: Initiated session with FM tasks utilizng LUE (paper clip oconnor, toothpick to  10 buttons). Next pt completed 2 sets x15 pronation/supination strengthening exercises with green therabar. Pt used wristersizer with L hand with elbow placed against body to isolate wrist x3 one side and back while seated. Pt completed resistive clothes pins while seated with L hand all colors on/off of rachna. Theraputty- Coral to complete pincer with each digit and thumb x1. Pt finished session with light stretches of L hand & wrist.   Assessment/Comments: Pt is making Good progress toward stated plan of care.  Pt demonstrated improved fine motor and coordination with L hand.    -Rehab Potential: Good  -Requires OT Follow Up: Yes  Time In: 9:30 am            Time Out: 10:40 am             Visit #: 7    Treatment Charges: Mins Units   Modalities     Ther Exercise 60 4   Manual Therapy     Thera Activities 10 1   ADL/Home Mgt      Neuro Re-education     Gait Training     Group Therapy     Non-Billable Service Time     Other     Total Time/Units 70 5     -Response to Treatment: Pt tolerated session well  Goals: Goals for pt can be see on initial eval occurring on 7/10/2019    Plan:   [x]  Continues Plan of care: Treatment covered based on POC and graduated to

## 2019-08-07 ENCOUNTER — HOSPITAL ENCOUNTER (OUTPATIENT)
Dept: OCCUPATIONAL THERAPY | Age: 74
Setting detail: THERAPIES SERIES
Discharge: HOME | End: 2019-08-07
Payer: MEDICARE

## 2019-08-07 ENCOUNTER — HOSPITAL ENCOUNTER (OUTPATIENT)
Dept: PHYSICAL THERAPY | Age: 74
Setting detail: THERAPIES SERIES
Discharge: HOME | End: 2019-08-07
Payer: MEDICARE

## 2019-08-07 PROCEDURE — 97112 NEUROMUSCULAR REEDUCATION: CPT | Performed by: PHYSICAL THERAPIST

## 2019-08-07 PROCEDURE — 97110 THERAPEUTIC EXERCISES: CPT | Performed by: OCCUPATIONAL THERAPIST

## 2019-08-07 PROCEDURE — 97110 THERAPEUTIC EXERCISES: CPT | Performed by: PHYSICAL THERAPIST

## 2019-08-12 ENCOUNTER — HOSPITAL ENCOUNTER (OUTPATIENT)
Dept: PHYSICAL THERAPY | Age: 74
Setting detail: THERAPIES SERIES
Discharge: HOME | End: 2019-08-12
Payer: MEDICARE

## 2019-08-12 ENCOUNTER — HOSPITAL ENCOUNTER (OUTPATIENT)
Dept: OCCUPATIONAL THERAPY | Age: 74
Setting detail: THERAPIES SERIES
Discharge: HOME | End: 2019-08-12
Payer: MEDICARE

## 2019-08-12 PROCEDURE — 97110 THERAPEUTIC EXERCISES: CPT

## 2019-08-12 PROCEDURE — 97110 THERAPEUTIC EXERCISES: CPT | Performed by: OCCUPATIONAL THERAPIST

## 2019-08-12 NOTE — PROGRESS NOTES
each visit to obtain maximum benefits from skilled OT intervention.   []  Alter Plan of care:   []  Discharge:      Jagjit Germain [place], OTR/L  [id number]

## 2019-08-14 ENCOUNTER — HOSPITAL ENCOUNTER (OUTPATIENT)
Dept: OCCUPATIONAL THERAPY | Age: 74
Setting detail: THERAPIES SERIES
Discharge: HOME | End: 2019-08-14
Payer: MEDICARE

## 2019-08-14 ENCOUNTER — HOSPITAL ENCOUNTER (OUTPATIENT)
Dept: PHYSICAL THERAPY | Age: 74
Setting detail: THERAPIES SERIES
Discharge: HOME | End: 2019-08-14
Payer: MEDICARE

## 2019-08-14 PROCEDURE — 97530 THERAPEUTIC ACTIVITIES: CPT | Performed by: PHYSICAL THERAPIST

## 2019-08-14 PROCEDURE — 97112 NEUROMUSCULAR REEDUCATION: CPT | Performed by: PHYSICAL THERAPIST

## 2019-08-14 PROCEDURE — 97110 THERAPEUTIC EXERCISES: CPT | Performed by: PHYSICAL THERAPIST

## 2019-08-14 PROCEDURE — 97110 THERAPEUTIC EXERCISES: CPT | Performed by: OCCUPATIONAL THERAPIST

## 2019-08-14 NOTE — PROGRESS NOTES
[address]      Phone: [phone]  Fax: [phone]    Physical Therapy  Out Patient Progress Report      Date:  2019    Patient Name:  Wilmer Moody    :  1945  MRN: [id number]      DIAGNOSIS:  Stroke  REFERRING PHYSICIAN:  Kelsi Williamson MD  CERTIFICATION PERIOD:  19 to 19    ATTENDANCE:  Pt has attended 11 of 11 scheduled treatments from 7/10/19  To 19. TREATMENTS RECEIVED:  Strength training, gait training, functional mobility training, balance, safety awareness, neuromuscular reeducation     INITIAL STATUS:  · Strength: left hip 3/5, ankle DF 4/5  · Dynamic gait Index score: 16  · Pt required use of wheeled walker for independent gait. When ambulating without a device, pt required SBA/CGA due to LE weakness and safety concerns    CURRENT STATUS:  · Strength: L hip 4-/5, ankle DF 5/5  · Dynamic gait index score: 18  · Pt is able to ambulate without a device with supervision. Pt continues to demonstrate some mild discontinuity in steps due to L LE weakness, some LOB with high level gait tasks, and some difficulty clearing obstables        COMMENTS AND RECOMMENDATIONS: pt is making good progress in all areas. Recommend continued PT, 2x/week x 2 more weeks, to continue to improve strength and safety/independence with gait. Thank you for the opportunity to work with your patient. If you have questions or comments, please feel free to contact me by phone or fax.       Electronically Signed by: Nadja Murillo PT [id number]  2019            ___________________________________  2019    Physician     Date
Good [] Fair  [] Poor    Patient Requires Follow-up: [x] Yes  [] No    Plan:   [x] Continue per plan of care [] Alter current plan (see comments)  [] Plan of care initiated [] Hold pending MD visit [] Discharge  Plan for Next Session:        Electronically signed by:  Willa Lake, PT [id number]

## 2019-08-19 ENCOUNTER — HOSPITAL ENCOUNTER (OUTPATIENT)
Dept: PHYSICAL THERAPY | Age: 74
Setting detail: THERAPIES SERIES
Discharge: HOME | End: 2019-08-19
Payer: MEDICARE

## 2019-08-19 ENCOUNTER — HOSPITAL ENCOUNTER (OUTPATIENT)
Dept: OCCUPATIONAL THERAPY | Age: 74
Setting detail: THERAPIES SERIES
Discharge: HOME | End: 2019-08-19
Payer: MEDICARE

## 2019-08-19 PROCEDURE — 97112 NEUROMUSCULAR REEDUCATION: CPT | Performed by: OCCUPATIONAL THERAPIST

## 2019-08-19 PROCEDURE — 97530 THERAPEUTIC ACTIVITIES: CPT | Performed by: OCCUPATIONAL THERAPIST

## 2019-08-19 PROCEDURE — 97110 THERAPEUTIC EXERCISES: CPT

## 2019-08-21 ENCOUNTER — HOSPITAL ENCOUNTER (OUTPATIENT)
Dept: PHYSICAL THERAPY | Age: 74
Setting detail: THERAPIES SERIES
Discharge: HOME | End: 2019-08-21
Payer: MEDICARE

## 2019-08-21 ENCOUNTER — HOSPITAL ENCOUNTER (OUTPATIENT)
Dept: OCCUPATIONAL THERAPY | Age: 74
Setting detail: THERAPIES SERIES
Discharge: HOME | End: 2019-08-21
Payer: MEDICARE

## 2019-08-21 PROCEDURE — 97530 THERAPEUTIC ACTIVITIES: CPT | Performed by: PHYSICAL THERAPIST

## 2019-08-21 PROCEDURE — 97110 THERAPEUTIC EXERCISES: CPT | Performed by: OCCUPATIONAL THERAPIST

## 2019-08-21 PROCEDURE — 97530 THERAPEUTIC ACTIVITIES: CPT | Performed by: OCCUPATIONAL THERAPIST

## 2019-08-21 PROCEDURE — 97112 NEUROMUSCULAR REEDUCATION: CPT | Performed by: PHYSICAL THERAPIST

## 2019-08-21 PROCEDURE — 97110 THERAPEUTIC EXERCISES: CPT | Performed by: PHYSICAL THERAPIST

## 2019-08-21 PROCEDURE — 97112 NEUROMUSCULAR REEDUCATION: CPT | Performed by: OCCUPATIONAL THERAPIST

## 2019-08-26 ENCOUNTER — HOSPITAL ENCOUNTER (OUTPATIENT)
Dept: OCCUPATIONAL THERAPY | Age: 74
Setting detail: THERAPIES SERIES
Discharge: HOME | End: 2019-08-26
Payer: MEDICARE

## 2019-08-26 ENCOUNTER — HOSPITAL ENCOUNTER (OUTPATIENT)
Dept: PHYSICAL THERAPY | Age: 74
Setting detail: THERAPIES SERIES
Discharge: HOME | End: 2019-08-26
Payer: MEDICARE

## 2019-08-26 PROCEDURE — 97110 THERAPEUTIC EXERCISES: CPT | Performed by: OCCUPATIONAL THERAPIST

## 2019-08-26 PROCEDURE — 97110 THERAPEUTIC EXERCISES: CPT

## 2019-08-28 ENCOUNTER — HOSPITAL ENCOUNTER (OUTPATIENT)
Dept: OCCUPATIONAL THERAPY | Age: 74
Setting detail: THERAPIES SERIES
Discharge: HOME | End: 2019-08-28
Payer: MEDICARE

## 2019-08-28 ENCOUNTER — HOSPITAL ENCOUNTER (OUTPATIENT)
Dept: PHYSICAL THERAPY | Age: 74
Setting detail: THERAPIES SERIES
Discharge: HOME | End: 2019-08-28
Payer: MEDICARE

## 2019-08-28 PROCEDURE — 97140 MANUAL THERAPY 1/> REGIONS: CPT | Performed by: OCCUPATIONAL THERAPIST

## 2019-08-28 PROCEDURE — 97110 THERAPEUTIC EXERCISES: CPT

## 2019-08-28 PROCEDURE — 97530 THERAPEUTIC ACTIVITIES: CPT

## 2019-08-28 PROCEDURE — 97110 THERAPEUTIC EXERCISES: CPT | Performed by: OCCUPATIONAL THERAPIST

## 2019-08-28 PROCEDURE — 97035 APP MDLTY 1+ULTRASOUND EA 15: CPT | Performed by: OCCUPATIONAL THERAPIST

## 2019-08-28 NOTE — PROGRESS NOTES
graduated to patient's progress. Pt education continues at each visit to obtain maximum benefits from skilled OT intervention.        To cont for 2 more sessions per insurance approval.   []  [address] of care:   []  Discharge:      [facility] OT/L, [address]

## 2019-09-04 ENCOUNTER — HOSPITAL ENCOUNTER (OUTPATIENT)
Dept: PHYSICAL THERAPY | Age: 74
Setting detail: THERAPIES SERIES
Discharge: HOME | End: 2019-09-04
Payer: MEDICARE

## 2019-09-04 PROCEDURE — 97110 THERAPEUTIC EXERCISES: CPT | Performed by: PHYSICAL THERAPIST

## 2019-09-04 PROCEDURE — 97112 NEUROMUSCULAR REEDUCATION: CPT | Performed by: PHYSICAL THERAPIST

## 2019-09-09 ENCOUNTER — APPOINTMENT (OUTPATIENT)
Dept: OCCUPATIONAL THERAPY | Age: 74
End: 2019-09-09
Payer: MEDICARE

## 2019-10-08 ENCOUNTER — OFFICE VISIT (OUTPATIENT)
Dept: FAMILY MEDICINE CLINIC | Age: 74
End: 2019-10-08
Payer: MEDICARE

## 2019-10-08 VITALS
DIASTOLIC BLOOD PRESSURE: 80 MMHG | HEIGHT: 62 IN | RESPIRATION RATE: 18 BRPM | SYSTOLIC BLOOD PRESSURE: 124 MMHG | OXYGEN SATURATION: 98 % | BODY MASS INDEX: 21.71 KG/M2 | HEART RATE: 69 BPM | TEMPERATURE: 98.4 F | WEIGHT: 118 LBS

## 2019-10-08 DIAGNOSIS — G40.909 SEIZURE DISORDER (HCC): Primary | ICD-10-CM

## 2019-10-08 DIAGNOSIS — Z11.59 NEED FOR HEPATITIS C SCREENING TEST: ICD-10-CM

## 2019-10-08 DIAGNOSIS — I63.9 CEREBROVASCULAR ACCIDENT (CVA), UNSPECIFIED MECHANISM (HCC): ICD-10-CM

## 2019-10-08 DIAGNOSIS — Z11.4 ENCOUNTER FOR SCREENING FOR HIV: ICD-10-CM

## 2019-10-08 DIAGNOSIS — E78.2 MIXED HYPERLIPIDEMIA: ICD-10-CM

## 2019-10-08 PROCEDURE — 1036F TOBACCO NON-USER: CPT | Performed by: FAMILY MEDICINE

## 2019-10-08 PROCEDURE — 1123F ACP DISCUSS/DSCN MKR DOCD: CPT | Performed by: FAMILY MEDICINE

## 2019-10-08 PROCEDURE — G8427 DOCREV CUR MEDS BY ELIG CLIN: HCPCS | Performed by: FAMILY MEDICINE

## 2019-10-08 PROCEDURE — G8484 FLU IMMUNIZE NO ADMIN: HCPCS | Performed by: FAMILY MEDICINE

## 2019-10-08 PROCEDURE — G8598 ASA/ANTIPLAT THER USED: HCPCS | Performed by: FAMILY MEDICINE

## 2019-10-08 PROCEDURE — 3017F COLORECTAL CA SCREEN DOC REV: CPT | Performed by: FAMILY MEDICINE

## 2019-10-08 PROCEDURE — 4040F PNEUMOC VAC/ADMIN/RCVD: CPT | Performed by: FAMILY MEDICINE

## 2019-10-08 PROCEDURE — G8400 PT W/DXA NO RESULTS DOC: HCPCS | Performed by: FAMILY MEDICINE

## 2019-10-08 PROCEDURE — 99203 OFFICE O/P NEW LOW 30 MIN: CPT | Performed by: FAMILY MEDICINE

## 2019-10-08 PROCEDURE — 1090F PRES/ABSN URINE INCON ASSESS: CPT | Performed by: FAMILY MEDICINE

## 2019-10-08 PROCEDURE — G8420 CALC BMI NORM PARAMETERS: HCPCS | Performed by: FAMILY MEDICINE

## 2019-10-08 RX ORDER — LEVETIRACETAM 500 MG/1
500 TABLET ORAL 2 TIMES DAILY
Qty: 180 TABLET | Refills: 1 | Status: SHIPPED
Start: 2019-10-08 | End: 2020-03-10 | Stop reason: SDUPTHER

## 2019-10-08 RX ORDER — LAMOTRIGINE 200 MG/1
200 TABLET ORAL 2 TIMES DAILY
Qty: 180 TABLET | Refills: 1 | Status: SHIPPED
Start: 2019-10-08 | End: 2020-03-10 | Stop reason: SDUPTHER

## 2019-10-08 RX ORDER — ATORVASTATIN CALCIUM 40 MG/1
40 TABLET, FILM COATED ORAL NIGHTLY
Qty: 90 TABLET | Refills: 1 | Status: SHIPPED
Start: 2019-10-08 | End: 2020-03-10 | Stop reason: SDUPTHER

## 2019-10-08 RX ORDER — CLOPIDOGREL BISULFATE 75 MG/1
75 TABLET ORAL DAILY
Qty: 90 TABLET | Refills: 1 | Status: SHIPPED
Start: 2019-10-08 | End: 2020-03-10 | Stop reason: SDUPTHER

## 2019-10-08 RX ORDER — IBUPROFEN 100 MG/1
100 TABLET, CHEWABLE ORAL EVERY 8 HOURS PRN
COMMUNITY
End: 2021-10-28

## 2019-10-08 ASSESSMENT — PATIENT HEALTH QUESTIONNAIRE - PHQ9
SUM OF ALL RESPONSES TO PHQ9 QUESTIONS 1 & 2: 0
1. LITTLE INTEREST OR PLEASURE IN DOING THINGS: 0
SUM OF ALL RESPONSES TO PHQ QUESTIONS 1-9: 0
SUM OF ALL RESPONSES TO PHQ QUESTIONS 1-9: 0
2. FEELING DOWN, DEPRESSED OR HOPELESS: 0

## 2019-10-28 ASSESSMENT — ENCOUNTER SYMPTOMS
SINUS PRESSURE: 0
EYE PAIN: 0
DIARRHEA: 0
COUGH: 0
VOMITING: 0
RHINORRHEA: 0
EYE DISCHARGE: 0
WHEEZING: 0
CONSTIPATION: 0
ABDOMINAL PAIN: 0
SHORTNESS OF BREATH: 0
COLOR CHANGE: 0
ABDOMINAL DISTENTION: 0
SORE THROAT: 0
CHEST TIGHTNESS: 0
BACK PAIN: 0

## 2019-10-30 LAB
ALBUMIN SERPL-MCNC: NORMAL G/DL
ALP BLD-CCNC: NORMAL U/L
ALT SERPL-CCNC: NORMAL U/L
ANION GAP SERPL CALCULATED.3IONS-SCNC: NORMAL MMOL/L
ANTIBODY: NORMAL
AST SERPL-CCNC: NORMAL U/L
AVERAGE GLUCOSE: NORMAL
BASOPHILS ABSOLUTE: NORMAL /ΜL
BASOPHILS RELATIVE PERCENT: NORMAL %
BILIRUB SERPL-MCNC: NORMAL MG/DL (ref 0.1–1.4)
BUN BLDV-MCNC: NORMAL MG/DL
CALCIUM SERPL-MCNC: NORMAL MG/DL
CHLORIDE BLD-SCNC: NORMAL MMOL/L
CHOLESTEROL, TOTAL: 176 MG/DL
CHOLESTEROL/HDL RATIO: 3.4
CO2: NORMAL MMOL/L
CREAT SERPL-MCNC: NORMAL MG/DL
EOSINOPHILS ABSOLUTE: NORMAL /ΜL
EOSINOPHILS RELATIVE PERCENT: NORMAL %
GFR CALCULATED: NORMAL
GLUCOSE BLD-MCNC: NORMAL MG/DL
HBA1C MFR BLD: 5.6 %
HCT VFR BLD CALC: NORMAL % (ref 36–46)
HDLC SERPL-MCNC: 52 MG/DL (ref 35–70)
HEMOGLOBIN: NORMAL G/DL (ref 12–16)
HIV AG/AB: NORMAL
LDL CHOLESTEROL CALCULATED: 103 MG/DL (ref 0–160)
LYMPHOCYTES ABSOLUTE: NORMAL /ΜL
LYMPHOCYTES RELATIVE PERCENT: NORMAL %
MCH RBC QN AUTO: NORMAL PG
MCHC RBC AUTO-ENTMCNC: NORMAL G/DL
MCV RBC AUTO: NORMAL FL
MONOCYTES ABSOLUTE: NORMAL /ΜL
MONOCYTES RELATIVE PERCENT: NORMAL %
NEUTROPHILS ABSOLUTE: NORMAL /ΜL
NEUTROPHILS RELATIVE PERCENT: NORMAL %
PLATELET # BLD: NORMAL K/ΜL
PMV BLD AUTO: NORMAL FL
POTASSIUM SERPL-SCNC: NORMAL MMOL/L
RBC # BLD: NORMAL 10^6/ΜL
SODIUM BLD-SCNC: NORMAL MMOL/L
TOTAL PROTEIN: NORMAL
TRIGL SERPL-MCNC: 110 MG/DL
TSH SERPL DL<=0.05 MIU/L-ACNC: NORMAL UIU/ML
VITAMIN D 25-HYDROXY: NORMAL
VITAMIN D2, 25 HYDROXY: NORMAL
VITAMIN D3,25 HYDROXY: NORMAL
VLDLC SERPL CALC-MCNC: NORMAL MG/DL
WBC # BLD: NORMAL 10^3/ML

## 2019-11-01 DIAGNOSIS — G40.909 SEIZURE DISORDER (HCC): ICD-10-CM

## 2019-11-01 DIAGNOSIS — E78.2 MIXED HYPERLIPIDEMIA: ICD-10-CM

## 2019-11-04 ENCOUNTER — OFFICE VISIT (OUTPATIENT)
Dept: NEUROLOGY | Age: 74
End: 2019-11-04
Payer: MEDICARE

## 2019-11-04 VITALS
BODY MASS INDEX: 21.53 KG/M2 | WEIGHT: 117 LBS | HEIGHT: 62 IN | DIASTOLIC BLOOD PRESSURE: 90 MMHG | SYSTOLIC BLOOD PRESSURE: 140 MMHG

## 2019-11-04 DIAGNOSIS — Z11.4 ENCOUNTER FOR SCREENING FOR HIV: ICD-10-CM

## 2019-11-04 DIAGNOSIS — I69.322 DYSARTHRIA AS LATE EFFECT OF STROKE: ICD-10-CM

## 2019-11-04 DIAGNOSIS — Z86.018 S/P RESECTION OF MENINGIOMA: ICD-10-CM

## 2019-11-04 DIAGNOSIS — I63.50 RIGHT PONTINE STROKE (HCC): Primary | ICD-10-CM

## 2019-11-04 DIAGNOSIS — Z86.69 HX OF SEIZURE DISORDER: ICD-10-CM

## 2019-11-04 DIAGNOSIS — G40.909 SEIZURE DISORDER (HCC): ICD-10-CM

## 2019-11-04 DIAGNOSIS — Z98.890 S/P RESECTION OF MENINGIOMA: ICD-10-CM

## 2019-11-04 DIAGNOSIS — Z11.59 NEED FOR HEPATITIS C SCREENING TEST: ICD-10-CM

## 2019-11-04 DIAGNOSIS — E78.2 MIXED HYPERLIPIDEMIA: ICD-10-CM

## 2019-11-04 PROCEDURE — G8598 ASA/ANTIPLAT THER USED: HCPCS | Performed by: PSYCHIATRY & NEUROLOGY

## 2019-11-04 PROCEDURE — 4040F PNEUMOC VAC/ADMIN/RCVD: CPT | Performed by: PSYCHIATRY & NEUROLOGY

## 2019-11-04 PROCEDURE — 99204 OFFICE O/P NEW MOD 45 MIN: CPT | Performed by: PSYCHIATRY & NEUROLOGY

## 2019-11-04 PROCEDURE — 1090F PRES/ABSN URINE INCON ASSESS: CPT | Performed by: PSYCHIATRY & NEUROLOGY

## 2019-11-04 PROCEDURE — G8400 PT W/DXA NO RESULTS DOC: HCPCS | Performed by: PSYCHIATRY & NEUROLOGY

## 2019-11-04 PROCEDURE — G8427 DOCREV CUR MEDS BY ELIG CLIN: HCPCS | Performed by: PSYCHIATRY & NEUROLOGY

## 2019-11-04 PROCEDURE — 1123F ACP DISCUSS/DSCN MKR DOCD: CPT | Performed by: PSYCHIATRY & NEUROLOGY

## 2019-11-04 PROCEDURE — 1036F TOBACCO NON-USER: CPT | Performed by: PSYCHIATRY & NEUROLOGY

## 2019-11-04 PROCEDURE — G8484 FLU IMMUNIZE NO ADMIN: HCPCS | Performed by: PSYCHIATRY & NEUROLOGY

## 2019-11-04 PROCEDURE — 3017F COLORECTAL CA SCREEN DOC REV: CPT | Performed by: PSYCHIATRY & NEUROLOGY

## 2019-11-04 PROCEDURE — G8420 CALC BMI NORM PARAMETERS: HCPCS | Performed by: PSYCHIATRY & NEUROLOGY

## 2019-11-04 ASSESSMENT — ENCOUNTER SYMPTOMS
ALLERGIC/IMMUNOLOGIC NEGATIVE: 1
EYES NEGATIVE: 1
RESPIRATORY NEGATIVE: 1
GASTROINTESTINAL NEGATIVE: 1

## 2019-11-08 DIAGNOSIS — E55.9 VITAMIN D DEFICIENCY: Primary | ICD-10-CM

## 2019-11-08 RX ORDER — ERGOCALCIFEROL 1.25 MG/1
50000 CAPSULE ORAL WEEKLY
Qty: 12 CAPSULE | Refills: 0 | Status: SHIPPED
Start: 2019-11-08 | End: 2020-03-10 | Stop reason: SDUPTHER

## 2019-11-19 ENCOUNTER — OFFICE VISIT (OUTPATIENT)
Dept: FAMILY MEDICINE CLINIC | Age: 74
End: 2019-11-19
Payer: MEDICARE

## 2019-11-19 VITALS
OXYGEN SATURATION: 98 % | DIASTOLIC BLOOD PRESSURE: 84 MMHG | RESPIRATION RATE: 18 BRPM | HEIGHT: 62 IN | SYSTOLIC BLOOD PRESSURE: 124 MMHG | BODY MASS INDEX: 21.71 KG/M2 | WEIGHT: 118 LBS | TEMPERATURE: 98.4 F | HEART RATE: 60 BPM

## 2019-11-19 DIAGNOSIS — E55.9 VITAMIN D DEFICIENCY: ICD-10-CM

## 2019-11-19 DIAGNOSIS — R74.8 ELEVATED ALKALINE PHOSPHATASE LEVEL: ICD-10-CM

## 2019-11-19 DIAGNOSIS — Z12.11 SCREENING FOR MALIGNANT NEOPLASM OF COLON: ICD-10-CM

## 2019-11-19 DIAGNOSIS — Z90.710 HISTORY OF HYSTERECTOMY: ICD-10-CM

## 2019-11-19 DIAGNOSIS — Z12.39 SCREENING FOR MALIGNANT NEOPLASM OF BREAST: Primary | ICD-10-CM

## 2019-11-19 DIAGNOSIS — Z23 NEED FOR PNEUMOCOCCAL VACCINE: ICD-10-CM

## 2019-11-19 DIAGNOSIS — Z86.73 HISTORY OF CVA (CEREBROVASCULAR ACCIDENT): ICD-10-CM

## 2019-11-19 DIAGNOSIS — Z00.00 MEDICARE ANNUAL WELLNESS VISIT, INITIAL: ICD-10-CM

## 2019-11-19 DIAGNOSIS — G40.909 SEIZURE DISORDER (HCC): ICD-10-CM

## 2019-11-19 PROCEDURE — G0438 PPPS, INITIAL VISIT: HCPCS | Performed by: FAMILY MEDICINE

## 2019-11-19 PROCEDURE — 1123F ACP DISCUSS/DSCN MKR DOCD: CPT | Performed by: FAMILY MEDICINE

## 2019-11-19 PROCEDURE — G8598 ASA/ANTIPLAT THER USED: HCPCS | Performed by: FAMILY MEDICINE

## 2019-11-19 PROCEDURE — G8482 FLU IMMUNIZE ORDER/ADMIN: HCPCS | Performed by: FAMILY MEDICINE

## 2019-11-19 PROCEDURE — G0008 ADMIN INFLUENZA VIRUS VAC: HCPCS | Performed by: FAMILY MEDICINE

## 2019-11-19 PROCEDURE — G0009 ADMIN PNEUMOCOCCAL VACCINE: HCPCS | Performed by: FAMILY MEDICINE

## 2019-11-19 PROCEDURE — 90670 PCV13 VACCINE IM: CPT | Performed by: FAMILY MEDICINE

## 2019-11-19 PROCEDURE — 4040F PNEUMOC VAC/ADMIN/RCVD: CPT | Performed by: FAMILY MEDICINE

## 2019-11-19 PROCEDURE — 90653 IIV ADJUVANT VACCINE IM: CPT | Performed by: FAMILY MEDICINE

## 2019-11-19 PROCEDURE — 3017F COLORECTAL CA SCREEN DOC REV: CPT | Performed by: FAMILY MEDICINE

## 2019-11-19 ASSESSMENT — PATIENT HEALTH QUESTIONNAIRE - PHQ9
SUM OF ALL RESPONSES TO PHQ QUESTIONS 1-9: 0
SUM OF ALL RESPONSES TO PHQ QUESTIONS 1-9: 0

## 2019-11-19 ASSESSMENT — LIFESTYLE VARIABLES: HOW OFTEN DO YOU HAVE A DRINK CONTAINING ALCOHOL: 0

## 2019-12-26 ENCOUNTER — HOSPITAL ENCOUNTER (OUTPATIENT)
Dept: OCCUPATIONAL THERAPY | Age: 74
Setting detail: THERAPIES SERIES
Discharge: HOME | End: 2019-12-26
Payer: MEDICARE

## 2020-01-03 ENCOUNTER — TELEPHONE (OUTPATIENT)
Dept: ADMINISTRATIVE | Age: 75
End: 2020-01-03

## 2020-01-09 ENCOUNTER — HOSPITAL ENCOUNTER (OUTPATIENT)
Age: 75
Discharge: HOME | End: 2020-01-11
Payer: MEDICARE

## 2020-01-09 ENCOUNTER — NURSE ONLY (OUTPATIENT)
Dept: FAMILY MEDICINE CLINIC | Age: 75
End: 2020-01-09
Payer: MEDICARE

## 2020-01-09 LAB
ALBUMIN SERPL-MCNC: 4.5 G/DL (ref 3.5–5.2)
ALP BLD-CCNC: 174 U/L (ref 35–104)
ALT SERPL-CCNC: 14 U/L (ref 0–32)
ANION GAP SERPL CALCULATED.3IONS-SCNC: 14 MMOL/L (ref 7–16)
AST SERPL-CCNC: 23 U/L (ref 0–31)
BILIRUB SERPL-MCNC: 0.5 MG/DL (ref 0–1.2)
BUN BLDV-MCNC: 14 MG/DL (ref 8–23)
CALCIUM SERPL-MCNC: 10.3 MG/DL (ref 8.6–10.2)
CHLORIDE BLD-SCNC: 100 MMOL/L (ref 98–107)
CO2: 27 MMOL/L (ref 22–29)
CREAT SERPL-MCNC: 0.9 MG/DL (ref 0.5–1)
GAMMA GLUTAMYL TRANSFERASE: 24 U/L (ref 6–42)
GFR AFRICAN AMERICAN: >60
GFR NON-AFRICAN AMERICAN: >60 ML/MIN/1.73
GLUCOSE BLD-MCNC: 93 MG/DL (ref 74–99)
POTASSIUM SERPL-SCNC: 4.9 MMOL/L (ref 3.5–5)
SODIUM BLD-SCNC: 141 MMOL/L (ref 132–146)
TOTAL PROTEIN: 7.8 G/DL (ref 6.4–8.3)

## 2020-01-09 PROCEDURE — 36415 COLL VENOUS BLD VENIPUNCTURE: CPT | Performed by: FAMILY MEDICINE

## 2020-01-09 PROCEDURE — 80053 COMPREHEN METABOLIC PANEL: CPT

## 2020-01-09 PROCEDURE — 82977 ASSAY OF GGT: CPT

## 2020-01-09 NOTE — PROGRESS NOTES
Labs drawn per Dr. Angel Cantrell orders.     Electronically signed by Ashley Miller MA on 1/9/20 at 8:32 AM

## 2020-03-10 ENCOUNTER — OFFICE VISIT (OUTPATIENT)
Dept: FAMILY MEDICINE CLINIC | Age: 75
End: 2020-03-10
Payer: MEDICARE

## 2020-03-10 ENCOUNTER — HOSPITAL ENCOUNTER (OUTPATIENT)
Age: 75
Discharge: HOME | End: 2020-03-12
Payer: MEDICARE

## 2020-03-10 VITALS
HEIGHT: 62 IN | RESPIRATION RATE: 18 BRPM | HEART RATE: 75 BPM | WEIGHT: 117 LBS | BODY MASS INDEX: 21.53 KG/M2 | OXYGEN SATURATION: 98 % | SYSTOLIC BLOOD PRESSURE: 122 MMHG | DIASTOLIC BLOOD PRESSURE: 82 MMHG | TEMPERATURE: 98.1 F

## 2020-03-10 LAB
ALBUMIN SERPL-MCNC: 4.1 G/DL (ref 3.5–5.2)
ALP BLD-CCNC: 154 U/L (ref 35–104)
ALT SERPL-CCNC: 8 U/L (ref 0–32)
ANION GAP SERPL CALCULATED.3IONS-SCNC: 16 MMOL/L (ref 7–16)
AST SERPL-CCNC: 16 U/L (ref 0–31)
BILIRUB SERPL-MCNC: 0.2 MG/DL (ref 0–1.2)
BUN BLDV-MCNC: 16 MG/DL (ref 8–23)
CALCIUM SERPL-MCNC: 9.9 MG/DL (ref 8.6–10.2)
CHLORIDE BLD-SCNC: 101 MMOL/L (ref 98–107)
CO2: 25 MMOL/L (ref 22–29)
CREAT SERPL-MCNC: 0.8 MG/DL (ref 0.5–1)
FOLATE: 16 NG/ML (ref 4.8–24.2)
GFR AFRICAN AMERICAN: >60
GFR NON-AFRICAN AMERICAN: >60 ML/MIN/1.73
GLUCOSE BLD-MCNC: 82 MG/DL (ref 74–99)
HCT VFR BLD CALC: 43.6 % (ref 34–48)
HEMOGLOBIN: 13.1 G/DL (ref 11.5–15.5)
MCH RBC QN AUTO: 28.4 PG (ref 26–35)
MCHC RBC AUTO-ENTMCNC: 30 % (ref 32–34.5)
MCV RBC AUTO: 94.6 FL (ref 80–99.9)
PARATHYROID HORMONE INTACT: 34 PG/ML (ref 15–65)
PDW BLD-RTO: 14.5 FL (ref 11.5–15)
PLATELET # BLD: 297 E9/L (ref 130–450)
PMV BLD AUTO: 9.3 FL (ref 7–12)
POTASSIUM SERPL-SCNC: 4.4 MMOL/L (ref 3.5–5)
RBC # BLD: 4.61 E12/L (ref 3.5–5.5)
SODIUM BLD-SCNC: 142 MMOL/L (ref 132–146)
TOTAL PROTEIN: 7.5 G/DL (ref 6.4–8.3)
VITAMIN B-12: 378 PG/ML (ref 211–946)
WBC # BLD: 4.1 E9/L (ref 4.5–11.5)

## 2020-03-10 PROCEDURE — 99214 OFFICE O/P EST MOD 30 MIN: CPT | Performed by: FAMILY MEDICINE

## 2020-03-10 PROCEDURE — 3017F COLORECTAL CA SCREEN DOC REV: CPT | Performed by: FAMILY MEDICINE

## 2020-03-10 PROCEDURE — 80053 COMPREHEN METABOLIC PANEL: CPT

## 2020-03-10 PROCEDURE — 1123F ACP DISCUSS/DSCN MKR DOCD: CPT | Performed by: FAMILY MEDICINE

## 2020-03-10 PROCEDURE — G8400 PT W/DXA NO RESULTS DOC: HCPCS | Performed by: FAMILY MEDICINE

## 2020-03-10 PROCEDURE — 82746 ASSAY OF FOLIC ACID SERUM: CPT

## 2020-03-10 PROCEDURE — 1090F PRES/ABSN URINE INCON ASSESS: CPT | Performed by: FAMILY MEDICINE

## 2020-03-10 PROCEDURE — 1036F TOBACCO NON-USER: CPT | Performed by: FAMILY MEDICINE

## 2020-03-10 PROCEDURE — 4040F PNEUMOC VAC/ADMIN/RCVD: CPT | Performed by: FAMILY MEDICINE

## 2020-03-10 PROCEDURE — 85027 COMPLETE CBC AUTOMATED: CPT

## 2020-03-10 PROCEDURE — G8420 CALC BMI NORM PARAMETERS: HCPCS | Performed by: FAMILY MEDICINE

## 2020-03-10 PROCEDURE — 82607 VITAMIN B-12: CPT

## 2020-03-10 PROCEDURE — G8427 DOCREV CUR MEDS BY ELIG CLIN: HCPCS | Performed by: FAMILY MEDICINE

## 2020-03-10 PROCEDURE — 83970 ASSAY OF PARATHORMONE: CPT

## 2020-03-10 PROCEDURE — G8482 FLU IMMUNIZE ORDER/ADMIN: HCPCS | Performed by: FAMILY MEDICINE

## 2020-03-10 RX ORDER — LAMOTRIGINE 200 MG/1
200 TABLET ORAL 2 TIMES DAILY
Qty: 180 TABLET | Refills: 1 | Status: SHIPPED
Start: 2020-03-10 | End: 2020-08-05

## 2020-03-10 RX ORDER — ATORVASTATIN CALCIUM 40 MG/1
40 TABLET, FILM COATED ORAL NIGHTLY
Qty: 90 TABLET | Refills: 1 | Status: SHIPPED
Start: 2020-03-10 | End: 2020-08-05

## 2020-03-10 RX ORDER — LEVETIRACETAM 500 MG/1
500 TABLET ORAL 2 TIMES DAILY
Qty: 180 TABLET | Refills: 1 | Status: SHIPPED
Start: 2020-03-10 | End: 2020-08-05

## 2020-03-10 RX ORDER — ERGOCALCIFEROL 1.25 MG/1
50000 CAPSULE ORAL WEEKLY
Qty: 12 CAPSULE | Refills: 0 | Status: SHIPPED
Start: 2020-03-10 | End: 2020-10-23 | Stop reason: ALTCHOICE

## 2020-03-10 RX ORDER — CLOPIDOGREL BISULFATE 75 MG/1
75 TABLET ORAL DAILY
Qty: 90 TABLET | Refills: 1 | Status: SHIPPED
Start: 2020-03-10 | End: 2020-08-05

## 2020-03-10 ASSESSMENT — PATIENT HEALTH QUESTIONNAIRE - PHQ9
SUM OF ALL RESPONSES TO PHQ QUESTIONS 1-9: 0
1. LITTLE INTEREST OR PLEASURE IN DOING THINGS: 0
2. FEELING DOWN, DEPRESSED OR HOPELESS: 0
SUM OF ALL RESPONSES TO PHQ9 QUESTIONS 1 & 2: 0
SUM OF ALL RESPONSES TO PHQ QUESTIONS 1-9: 0

## 2020-03-10 NOTE — PROGRESS NOTES
MD Rufina   Medication Sig Dispense Refill    lamoTRIgine (LAMICTAL) 200 MG tablet Take 1 tablet by mouth 2 times daily 180 tablet 1    clopidogrel (PLAVIX) 75 MG tablet Take 1 tablet by mouth daily 90 tablet 1    atorvastatin (LIPITOR) 40 MG tablet Take 1 tablet by mouth nightly 90 tablet 1    levETIRAcetam (KEPPRA) 500 MG tablet Take 1 tablet by mouth 2 times daily 180 tablet 1    vitamin D (ERGOCALCIFEROL) 1.25 MG (64098 UT) CAPS capsule Take 1 capsule by mouth once a week 12 capsule 0    ibuprofen (IBUPROFEN 100 JESSICA STRENGTH) 100 MG chewable tablet Take 100 mg by mouth every 8 hours as needed for Pain      aspirin 81 MG chewable tablet Take 1 tablet by mouth daily 90 tablet 0       I have reviewed all pertinent PMHx, PSHx, FamHx, SocialHx, medications, and allergies and updated history as appropriate. OBJECTIVE    VS: /82   Pulse 75   Temp 98.1 °F (36.7 °C) (Temporal)   Resp 18   Ht 5' 2\" (1.575 m)   Wt 117 lb (53.1 kg)   LMP  (LMP Unknown)   SpO2 98%   Breastfeeding No   BMI 21.40 kg/m²   Physical Exam  Constitutional:       General: She is not in acute distress. Appearance: She is well-developed. She is not diaphoretic. HENT:      Head: Normocephalic and atraumatic. Right Ear: External ear normal.      Left Ear: External ear normal.   Eyes:      Conjunctiva/sclera: Conjunctivae normal.      Pupils: Pupils are equal, round, and reactive to light. Neck:      Musculoskeletal: Normal range of motion and neck supple. Cardiovascular:      Rate and Rhythm: Normal rate and regular rhythm. Heart sounds: Normal heart sounds. No murmur. No friction rub. No gallop. Pulmonary:      Effort: Pulmonary effort is normal.      Breath sounds: Normal breath sounds. Abdominal:      General: Bowel sounds are normal.      Palpations: Abdomen is soft. Tenderness: There is no abdominal tenderness. Hernia: No hernia is present.    Musculoskeletal: Normal range of motion. General: No swelling or tenderness. Comments: Strength intact   Skin:     General: Skin is warm and dry. Neurological:      Mental Status: She is alert and oriented to person, place, and time. Psychiatric:         Behavior: Behavior normal.       ASSESSMENT/PLAN:  1. Hoarseness  None today. Previous hx of smoke exposure. Will send patient to ENT for evaluation.   - Emilia - Amor Pea, DO, Ear, Nose, Throat, Anca    2. Cerebrovascular accident (CVA), unspecified mechanism ([facility] [address]  6/2019. No residual weakness. Continue to control comorbid conditions. - clopidogrel (PLAVIX) 75 MG tablet; Take 1 tablet by mouth daily  Dispense: 90 tablet; Refill: 1    3. Seizure disorder (HCC)  - lamoTRIgine (LAMICTAL) 200 MG tablet; Take 1 tablet by mouth 2 times daily  Dispense: 180 tablet; Refill: 1  - levETIRAcetam (KEPPRA) 500 MG tablet; Take 1 tablet by mouth 2 times daily  Dispense: 180 tablet; Refill: 1    4. History of meningioma  Resected 2001.    5. Mixed hyperlipidemia  - atorvastatin (LIPITOR) 40 MG tablet; Take 1 tablet by mouth nightly  Dispense: 90 tablet; Refill: 1    6. Vitamin D deficiency  - vitamin D (ERGOCALCIFEROL) 1.25 MG (94065 UT) CAPS capsule; Take 1 capsule by mouth once a week  Dispense: 12 capsule; Refill: 0    7. Serum calcium elevated  - Comprehensive Metabolic Panel; Future  - PTH, INTACT; Future    8. Other fatigue  - Vitamin B12; Future  - Folate; Future  - CBC; Future    9. Primary insomnia  Patient to practice sleep hygiene and urinate prior to bed. Recommend otc Melatonin as well. 10. Elevated alkaline phosphatase level  With normal ggt. Referral placed to Endocrinology. I have reviewed my findings and recommendations with Bryce Mcneil MD  3/17/2020 10:20 AM     Counseled regarding above diagnosis, including possible risks and complications, especially if left uncontrolled.     Patient counseled on red flag symptoms and if they

## 2020-03-17 PROBLEM — E55.9 VITAMIN D DEFICIENCY: Status: ACTIVE | Noted: 2020-03-17

## 2020-03-17 PROBLEM — R49.0 HOARSENESS: Status: ACTIVE | Noted: 2020-03-17

## 2020-03-17 PROBLEM — R53.83 OTHER FATIGUE: Status: ACTIVE | Noted: 2020-03-17

## 2020-03-17 PROBLEM — Z86.018 HISTORY OF MENINGIOMA: Status: ACTIVE | Noted: 2020-03-17

## 2020-03-17 PROBLEM — E83.52 SERUM CALCIUM ELEVATED: Status: ACTIVE | Noted: 2020-03-17

## 2020-03-17 PROBLEM — F51.01 PRIMARY INSOMNIA: Status: ACTIVE | Noted: 2020-03-17

## 2020-03-17 PROBLEM — R74.8 ELEVATED ALKALINE PHOSPHATASE LEVEL: Status: ACTIVE | Noted: 2020-03-17

## 2020-03-17 PROBLEM — E78.2 MIXED HYPERLIPIDEMIA: Status: ACTIVE | Noted: 2020-03-17

## 2020-03-17 ASSESSMENT — ENCOUNTER SYMPTOMS
DIARRHEA: 0
CHEST TIGHTNESS: 0
SHORTNESS OF BREATH: 0
VOMITING: 0
COUGH: 0
SINUS PRESSURE: 0
WHEEZING: 0
ABDOMINAL DISTENTION: 0
ABDOMINAL PAIN: 0
CONSTIPATION: 0
EYE DISCHARGE: 0
COLOR CHANGE: 0
BACK PAIN: 0
RHINORRHEA: 0
EYE PAIN: 0
SORE THROAT: 0

## 2020-05-08 ENCOUNTER — TELEPHONE (OUTPATIENT)
Dept: ADMINISTRATIVE | Age: 75
End: 2020-05-08

## 2020-05-08 NOTE — TELEPHONE ENCOUNTER
Pt called and unsure of when Dr. Edelmira Vela wants to see her again. Her last appt was 3/10/20. Pt does not want virtual visit, would want face to face visit. Please advise.

## 2020-06-29 ENCOUNTER — OFFICE VISIT (OUTPATIENT)
Dept: ENT CLINIC | Age: 75
End: 2020-06-29
Payer: MEDICARE

## 2020-06-29 VITALS — WEIGHT: 117 LBS | TEMPERATURE: 98.6 F | BODY MASS INDEX: 21.53 KG/M2 | HEIGHT: 62 IN

## 2020-06-29 PROCEDURE — 99204 OFFICE O/P NEW MOD 45 MIN: CPT | Performed by: OTOLARYNGOLOGY

## 2020-06-29 PROCEDURE — 1123F ACP DISCUSS/DSCN MKR DOCD: CPT | Performed by: OTOLARYNGOLOGY

## 2020-06-29 PROCEDURE — 4040F PNEUMOC VAC/ADMIN/RCVD: CPT | Performed by: OTOLARYNGOLOGY

## 2020-06-29 PROCEDURE — 3017F COLORECTAL CA SCREEN DOC REV: CPT | Performed by: OTOLARYNGOLOGY

## 2020-06-29 PROCEDURE — 1090F PRES/ABSN URINE INCON ASSESS: CPT | Performed by: OTOLARYNGOLOGY

## 2020-06-29 PROCEDURE — G8400 PT W/DXA NO RESULTS DOC: HCPCS | Performed by: OTOLARYNGOLOGY

## 2020-06-29 PROCEDURE — 1036F TOBACCO NON-USER: CPT | Performed by: OTOLARYNGOLOGY

## 2020-06-29 PROCEDURE — G8420 CALC BMI NORM PARAMETERS: HCPCS | Performed by: OTOLARYNGOLOGY

## 2020-06-29 PROCEDURE — G8427 DOCREV CUR MEDS BY ELIG CLIN: HCPCS | Performed by: OTOLARYNGOLOGY

## 2020-06-29 RX ORDER — AZELASTINE 1 MG/ML
1 SPRAY, METERED NASAL 2 TIMES DAILY
Qty: 2 BOTTLE | Refills: 1 | Status: SHIPPED
Start: 2020-06-29 | End: 2020-10-23 | Stop reason: ALTCHOICE

## 2020-06-29 NOTE — PROGRESS NOTES
DEPARTMENT OF OTOLARYNGOLOGY   HISTORY AND PHYSICAL      PATIENT: Leatha Cruz : 1945 (10 y.o.)   DATE: 2020  REFERRED BY: Danilo Peters MD  [address] [place] [address]      HISTORY OBTAINED FROM:  patient    CHIEF COMPLAINT:  had concerns including Hoarse (new pt consult for hoarseness. duration x1 year now. pt denies any difficulty swallowing. ). HISTORY OF PRESENT ILLNESS:                                                                                        Leatha Cruz is a(n) 70 y.o. female with significant past medical history of brain meningioma s/p resected  in [address] [place] [address] presents to the office today as a new patient for evaluation of her hoarseness that started a year ago. Patient describes her voice is more raspy with breaks in between. Also decreased voice projection. Denies any enticing event. Denies recent intubation. Never smoker. Denies globus sensation or GERD. Denies seasonal allergy. Denies weight changes. Denies SOB, sore throat, or dysphagia. Admits to frequent throat clearing and dry cough.      Past Medical History:   Diagnosis Date    Cerebral artery occlusion with cerebral infarction ([facility] [address]     Hyperlipidemia         Past Surgical History:   Procedure Laterality Date    BRAIN MENINGIOMA EXCISION      HYSTERECTOMY      HYSTERECTOMY, VAGINAL  2014    Total         Current Outpatient Medications:     lamoTRIgine (LAMICTAL) 200 MG tablet, Take 1 tablet by mouth 2 times daily, Disp: 180 tablet, Rfl: 1    clopidogrel (PLAVIX) 75 MG tablet, Take 1 tablet by mouth daily, Disp: 90 tablet, Rfl: 1    atorvastatin (LIPITOR) 40 MG tablet, Take 1 tablet by mouth nightly, Disp: 90 tablet, Rfl: 1    levETIRAcetam (KEPPRA) 500 MG tablet, Take 1 tablet by mouth 2 times daily, Disp: 180 tablet, Rfl: 1    vitamin D (ERGOCALCIFEROL) 1.25 MG (97959 UT) CAPS capsule, Take 1 capsule by mouth once a week, Disp: 12 capsule, Rfl: 0    ibuprofen (IBUPROFEN 100 JESSICA STRENGTH) 100 MG chewable tablet, Take 100 mg by mouth every 8 hours as needed for Pain, Disp: , Rfl:     aspirin 81 MG chewable tablet, Take 1 tablet by mouth daily, Disp: 90 tablet, Rfl: 0    No Known Allergies    No family history on file. Social History     Socioeconomic History    Marital status:      Spouse name: Not on file    Number of children: Not on file    Years of education: Not on file    Highest education level: Not on file   Occupational History    Not on file   Social Needs    Financial resource strain: Not on file    Food insecurity     Worry: Not on file     Inability: Not on file    Transportation needs     Medical: Not on file     Non-medical: Not on file   Tobacco Use    Smoking status: Passive Smoke Exposure - Never Smoker    Smokeless tobacco: Never Used    Tobacco comment: Pt's  was heavy smoker   Substance and Sexual Activity    Alcohol use: Never     Frequency: Never    Drug use: Never    Sexual activity: Not on file   Lifestyle    Physical activity     Days per week: Not on file     Minutes per session: Not on file    Stress: Not on file   Relationships    Social connections     Talks on phone: Not on file     Gets together: Not on file     Attends Spiritism service: Not on file     Active member of club or organization: Not on file     Attends meetings of clubs or organizations: Not on file     Relationship status: Not on file    Intimate partner violence     Fear of current or ex partner: Not on file     Emotionally abused: Not on file     Physically abused: Not on file     Forced sexual activity: Not on file   Other Topics Concern    Not on file   Social History Narrative    Not on file       REVIEW OF SYSTEMS:  Review of Systems  Constitutional: Negative for chills and fever. Eyes: Negative for discharge and itching. ENT: Negative for congestion, ear discharge, ear pain, hearing loss and sore throat.    Respiratory: Negative for chest tightness and shortness of breath. Cardiovascular: Negative for chestpain and palpitations. Gastrointestinal: Negative for abdominal distention and abdominal pain. Endocrinology: Negative for heat and cold intolerance. Neurological: Negative for focal weaknessor seizure   Skin: Negative for rash and itchiness   Psychiatric: Negative for depression and hallucinations     PHYSICAL EXAM:                                                                                                                Temp 98.6 °F (37 °C)   Ht 5' 2\" (1.575 m)   Wt 117 lb (53.1 kg)   LMP  (LMP Unknown)   BMI 21.40 kg/m²   Physical Exam  Constitutional: Appears well-developed and well-nourished. Appears as stated age. Eyes: Lids and lashes normal, pupils equal, extra ocular muscles intact, sclera clear   ENT: Sinuses nontender on palpation, external ears and ear canals without lesions, left tympanic membrane tympanic membrane intact without effusion or masses, left tympanic membrane tympanic membrane intact without effusion or masses, nasal septal not significantly deviated, bilateral inferior turbinates normal, no rhinorrhea, lips normal, Mallampati Grade 1, good dentition, gums normal, oral pharynx with moist mucus membranes, posterior pharynx with cobblestoning   Neck:  Supple, symmetrical, trachea midline, no adenopathy, thyroid symmetric, not enlarged and no tenderness, skin normal   Respiratory: No increased work of breathing. No stridor or wheezes   Cardiovascular: Regular rate   Skin: Warm and dry   Neurologic:  Alert and oriented, CN II-XII grossly intact     ASSESSMENTAND PLAN:                                                                                                  Diagnosis Orders   1. PND (post-nasal drip)     2.  Hoarseness of voice         · Start Astelin  · Possible scope next visit if hoarseness persist  · Obtain hearing eval next visit, per patient's reqest  · Follow up in 1 month(s) or sooner if symptoms acutely exacerbate    Patient was seen and examined with attending physician, who agrees with the assessment and plans. Soto Cee DO  Resident Physician  [facility]  Otolaryngology Residency  6/29/2020  10:08 AM          Karolyn Kimble  1945      I have discussed the case, including pertinent history and exam findings with the resident. I have seen and examined the patient and the key elements of the encounter have been performed by me. I agree with the assessment, plan and orders as documented by the resident. Patient here for follow up of medical problems. Remainder of medical problems as per resident note.       [address] [place] DO Tien  7/9/20

## 2020-09-15 ENCOUNTER — TELEPHONE (OUTPATIENT)
Dept: ADMINISTRATIVE | Age: 75
End: 2020-09-15

## 2020-10-23 ENCOUNTER — TELEPHONE (OUTPATIENT)
Dept: FAMILY MEDICINE CLINIC | Age: 75
End: 2020-10-23

## 2020-10-23 ENCOUNTER — OFFICE VISIT (OUTPATIENT)
Dept: FAMILY MEDICINE CLINIC | Age: 75
End: 2020-10-23
Payer: MEDICARE

## 2020-10-23 VITALS
SYSTOLIC BLOOD PRESSURE: 122 MMHG | DIASTOLIC BLOOD PRESSURE: 72 MMHG | OXYGEN SATURATION: 97 % | WEIGHT: 119.2 LBS | HEIGHT: 62 IN | RESPIRATION RATE: 16 BRPM | BODY MASS INDEX: 21.94 KG/M2 | TEMPERATURE: 97.6 F | HEART RATE: 62 BPM

## 2020-10-23 PROBLEM — G81.94 LEFT HEMIPARESIS (HCC): Status: RESOLVED | Noted: 2019-06-25 | Resolved: 2020-10-23

## 2020-10-23 PROCEDURE — 99214 OFFICE O/P EST MOD 30 MIN: CPT | Performed by: FAMILY MEDICINE

## 2020-10-23 PROCEDURE — 1036F TOBACCO NON-USER: CPT | Performed by: FAMILY MEDICINE

## 2020-10-23 PROCEDURE — G8427 DOCREV CUR MEDS BY ELIG CLIN: HCPCS | Performed by: FAMILY MEDICINE

## 2020-10-23 PROCEDURE — 1123F ACP DISCUSS/DSCN MKR DOCD: CPT | Performed by: FAMILY MEDICINE

## 2020-10-23 PROCEDURE — G8400 PT W/DXA NO RESULTS DOC: HCPCS | Performed by: FAMILY MEDICINE

## 2020-10-23 PROCEDURE — 1090F PRES/ABSN URINE INCON ASSESS: CPT | Performed by: FAMILY MEDICINE

## 2020-10-23 PROCEDURE — 3017F COLORECTAL CA SCREEN DOC REV: CPT | Performed by: FAMILY MEDICINE

## 2020-10-23 PROCEDURE — 4040F PNEUMOC VAC/ADMIN/RCVD: CPT | Performed by: FAMILY MEDICINE

## 2020-10-23 PROCEDURE — G8420 CALC BMI NORM PARAMETERS: HCPCS | Performed by: FAMILY MEDICINE

## 2020-10-23 PROCEDURE — G8484 FLU IMMUNIZE NO ADMIN: HCPCS | Performed by: FAMILY MEDICINE

## 2020-10-23 RX ORDER — FOLIC ACID 1 MG/1
1 TABLET ORAL DAILY
Qty: 90 TABLET | Refills: 1 | Status: SHIPPED | OUTPATIENT
Start: 2020-10-23

## 2020-10-23 RX ORDER — LAMOTRIGINE 200 MG/1
TABLET ORAL
Qty: 180 TABLET | Refills: 1 | Status: SHIPPED
Start: 2020-10-23 | End: 2020-12-04 | Stop reason: DRUGHIGH

## 2020-10-23 RX ORDER — CLOPIDOGREL BISULFATE 75 MG/1
TABLET ORAL
Qty: 90 TABLET | Refills: 1 | Status: SHIPPED
Start: 2020-10-23 | End: 2021-01-06 | Stop reason: ALTCHOICE

## 2020-10-23 RX ORDER — LEVETIRACETAM 500 MG/1
TABLET ORAL
Qty: 180 TABLET | Refills: 1 | Status: SHIPPED
Start: 2020-10-23 | End: 2021-06-07 | Stop reason: SDUPTHER

## 2020-10-23 RX ORDER — ATORVASTATIN CALCIUM 40 MG/1
40 TABLET, FILM COATED ORAL NIGHTLY
Qty: 90 TABLET | Refills: 0 | Status: SHIPPED
Start: 2020-10-23 | End: 2021-01-29

## 2020-10-23 ASSESSMENT — ENCOUNTER SYMPTOMS
EYE REDNESS: 0
CHEST TIGHTNESS: 0
ABDOMINAL PAIN: 0
COLOR CHANGE: 0
PHOTOPHOBIA: 0
ALLERGIC/IMMUNOLOGIC NEGATIVE: 1
GASTROINTESTINAL NEGATIVE: 1
VOICE CHANGE: 0
CHOKING: 0
ANAL BLEEDING: 0
EYE ITCHING: 0
RHINORRHEA: 0
RESPIRATORY NEGATIVE: 1
EYE PAIN: 0
TROUBLE SWALLOWING: 0
EYE DISCHARGE: 0
BLOOD IN STOOL: 0
ORTHOPNEA: 0
DIARRHEA: 0
ABDOMINAL DISTENTION: 0
SORE THROAT: 0
COUGH: 0
NAUSEA: 0
SHORTNESS OF BREATH: 0
BLURRED VISION: 0
SINUS PRESSURE: 0
VOMITING: 0
FACIAL SWELLING: 0
BACK PAIN: 0
CONSTIPATION: 0
WHEEZING: 0
RECTAL PAIN: 0
SINUS PAIN: 0
STRIDOR: 0
APNEA: 0

## 2020-10-23 NOTE — PROGRESS NOTES
SUBJECTIVE  Hola Cm is a 70 y.o. female. HPI/Chief C/O:  Chief Complaint   Patient presents with    Pre-op Exam     Surgical clearance for bilateral cataract removal with Dr Nilson Merino. Not scheduled yet.  Health Maintenance     Patient is agreeable to flu shot, but has questions. Please review. Patient agreeable to cologuard. No Known Allergies  The patient is here for a medication list and treatment planning review  We will go over our care planning goals as well as take care of all refills  We will set up labs as well   She is here pre op cataracts     Hypertension   Pertinent negatives include no anxiety, blurred vision, chest pain, headaches, malaise/fatigue, neck pain, orthopnea, palpitations, peripheral edema, PND, shortness of breath or sweats. Risk factors for coronary artery disease include post-menopausal state and dyslipidemia. Past treatments include lifestyle changes. The current treatment provides significant improvement. Compliance problems include diet. Hypertensive end-organ damage includes CVA. There is no history of angina, kidney disease, CAD/MI, heart failure, left ventricular hypertrophy, PVD or retinopathy. There is no history of chronic renal disease, coarctation of the aorta, hyperaldosteronism, hypercortisolism, hyperparathyroidism, a hypertension causing med, pheochromocytoma, renovascular disease, sleep apnea or a thyroid problem. ROS:  Review of Systems   Constitutional: Negative. Negative for activity change, appetite change, chills, diaphoresis, fatigue, fever, malaise/fatigue and unexpected weight change. HENT: Negative. Negative for congestion, dental problem, drooling, ear discharge, ear pain, facial swelling, hearing loss, mouth sores, nosebleeds, postnasal drip, rhinorrhea, sinus pressure, sinus pain, sneezing, sore throat, tinnitus, trouble swallowing and voice change. Eyes: Positive for visual disturbance (OD and OS cataract).  Negative for blurred vision, photophobia, pain, discharge, redness and itching. Respiratory: Negative. Negative for apnea, cough, choking, chest tightness, shortness of breath, wheezing and stridor. Cardiovascular: Negative. Negative for chest pain, palpitations, orthopnea, leg swelling and PND. Gastrointestinal: Negative. Negative for abdominal distention, abdominal pain, anal bleeding, blood in stool, constipation, diarrhea, nausea, rectal pain and vomiting. Endocrine: Negative. Negative for cold intolerance, heat intolerance, polydipsia, polyphagia and polyuria. Genitourinary: Negative. Negative for decreased urine volume, difficulty urinating, dysuria, enuresis, flank pain, frequency, genital sores, hematuria and urgency. Musculoskeletal: Positive for arthralgias. Negative for back pain, gait problem, joint swelling, myalgias, neck pain and neck stiffness. Skin: Negative. Negative for color change, pallor, rash and wound. Allergic/Immunologic: Negative. Negative for environmental allergies, food allergies and immunocompromised state. Neurological: Negative. Negative for dizziness, tremors, seizures, syncope, facial asymmetry, speech difficulty, weakness, light-headedness, numbness and headaches. Hematological: Negative. Negative for adenopathy. Does not bruise/bleed easily. Psychiatric/Behavioral: Negative. Negative for agitation, behavioral problems, confusion, decreased concentration, dysphoric mood, hallucinations, self-injury, sleep disturbance and suicidal ideas. The patient is not nervous/anxious and is not hyperactive. Past Medical/Surgical Hx;  Reviewed with patient      Diagnosis Date    Cerebral artery occlusion with cerebral infarction ([facility]     Hyperlipidemia      Past Surgical History:   Procedure Laterality Date    BRAIN MENINGIOMA EXCISION      HYSTERECTOMY      HYSTERECTOMY, VAGINAL  2014    Total       Past Family Hx:  Reviewed with patient  History reviewed.  No pertinent family history. Social Hx:  Reviewed with patient  Social History     Tobacco Use    Smoking status: Passive Smoke Exposure - Never Smoker    Smokeless tobacco: Never Used    Tobacco comment: Pt's  was heavy smoker   Substance Use Topics    Alcohol use: Never     Frequency: Never       OBJECTIVE  /72 (Site: Left Upper Arm, Position: Sitting)   Pulse 62   Temp 97.6 °F (36.4 °C) (Temporal)   Resp 16   Ht 5' 2\" (1.575 m)   Wt 119 lb 3.2 oz (54.1 kg)   LMP  (LMP Unknown)   SpO2 97%   BMI 21.80 kg/m²     Problem List:  Will Cantrell does not have any pertinent problems on file. PHYS EX:  Physical Exam  Vitals signs and nursing note reviewed. Constitutional:       General: She is not in acute distress. Appearance: Normal appearance. She is well-developed. She is not ill-appearing, toxic-appearing or diaphoretic. HENT:      Head: Normocephalic and atraumatic. Right Ear: Tympanic membrane, ear canal and external ear normal. There is no impacted cerumen. Left Ear: Tympanic membrane, ear canal and external ear normal. There is no impacted cerumen. Nose: Nose normal. No congestion or rhinorrhea. Mouth/Throat:      Mouth: Mucous membranes are moist.      Pharynx: No oropharyngeal exudate or posterior oropharyngeal erythema. Eyes:      General: No scleral icterus. Right eye: No discharge. Left eye: No discharge. Comments: OD and OS cataract    Neck:      Musculoskeletal: Normal range of motion and neck supple. No neck rigidity or muscular tenderness. Thyroid: No thyromegaly. Vascular: No carotid bruit or JVD. Trachea: No tracheal deviation. Cardiovascular:      Rate and Rhythm: Normal rate and regular rhythm. Pulses: Normal pulses. Heart sounds: Normal heart sounds. No murmur. No friction rub. No gallop. Pulmonary:      Effort: Pulmonary effort is normal. No respiratory distress.       Breath sounds: Normal breath sounds. No stridor. No wheezing, rhonchi or rales. Chest:      Chest wall: No tenderness. Abdominal:      General: Bowel sounds are normal. There is no distension. Palpations: Abdomen is soft. There is no mass. Tenderness: There is no abdominal tenderness. There is no right CVA tenderness, left CVA tenderness, guarding or rebound. Hernia: No hernia is present. Musculoskeletal: Normal range of motion. General: Tenderness present. No swelling, deformity or signs of injury. Right lower leg: No edema. Left lower leg: No edema. Lymphadenopathy:      Cervical: No cervical adenopathy. Skin:     General: Skin is warm. Coloration: Skin is not jaundiced or pale. Findings: No bruising, erythema, lesion or rash. Neurological:      General: No focal deficit present. Mental Status: She is alert and oriented to person, place, and time. Cranial Nerves: No cranial nerve deficit. Sensory: No sensory deficit. Motor: No weakness or abnormal muscle tone. Coordination: Coordination normal.      Gait: Gait normal.      Deep Tendon Reflexes: Reflexes are normal and symmetric. Reflexes normal.      Comments: Hemiplegia is resolved           1. Seizure disorder (HCC)    - levETIRAcetam (KEPPRA) 500 MG tablet; TAKE 1 TABLET BY MOUTH TWO  TIMES DAILY  Dispense: 180 tablet; Refill: 1  - lamoTRIgine (LAMICTAL) 200 MG tablet; TAKE 1 TABLET BY MOUTH TWO  TIMES DAILY  Dispense: 180 tablet; Refill: 1  - Basic Metabolic Panel; Future  - CBC Auto Differential; Future    2. Cerebrovascular accident (CVA), unspecified mechanism ([facility]    - clopidogrel (PLAVIX) 75 MG tablet; TAKE 1 TABLET BY MOUTH ONCE DAILY  Dispense: 90 tablet; Refill: 1  - Basic Metabolic Panel; Future  - CBC Auto Differential; Future    3. Mixed hyperlipidemia    - atorvastatin (LIPITOR) 40 MG tablet; Take 1 tablet by mouth nightly  Dispense: 90 tablet; Refill: 0  - Basic Metabolic Panel;  Future  - Lipid Panel; Future  - CBC Auto Differential; Future    4. 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 IFG (impaired fasting glucose)    - Basic Metabolic Panel; Future  - CBC Auto Differential; Future  - Hemoglobin A1C; Future    6. Fatigue, unspecified type    - TSH without Reflex; Future  - Uric Acid; Future  - Basic Metabolic Panel; Future  - CBC Auto Differential; Future    7. Screen for colon cancer    - Cologuard (For External Results Only); Future  - Cologuard (For External Results Only); Future  - Basic Metabolic Panel; Future  - CBC Auto Differential; Future        ASSESSMENT/PLAN  Soto Lombardi was seen today for pre-op exam and health maintenance. Diagnoses and all orders for this visit:    Cerebrovascular accident (CVA), unspecified mechanism ([facility]  -     clopidogrel (PLAVIX) 75 MG tablet; TAKE 1 TABLET BY MOUTH ONCE DAILY  -     Basic Metabolic Panel; Future  -     CBC Auto Differential; Future    ---VASCULAR PANEL  A) ASA, PLAVIX, aggrenox  B) coumadin, pletal, tzd, STATIN  C) ace, hctz, FOLIC, ccb  D) cannikinumab, fish oils     ---CARDIAC---ASA, ace, beta, STATIN, hctz, ( ccb )    Seizure disorder (HCC)  -     levETIRAcetam (KEPPRA) 500 MG tablet; TAKE 1 TABLET BY MOUTH TWO  TIMES DAILY  -     lamoTRIgine (LAMICTAL) 200 MG tablet; TAKE 1 TABLET BY MOUTH TWO  TIMES DAILY  -     Basic Metabolic Panel; Future  -     CBC Auto Differential; Future  --stable on current care planning  -- continue treatment as we are meeting goals       Mixed hyperlipidemia  -     atorvastatin (LIPITOR) 40 MG tablet; Take 1 tablet by mouth nightly  -     Basic Metabolic Panel; Future  -     Lipid Panel;  Future  -     CBC Auto Differential; Future  --Mediterranean diet, exercise, weight loss, vitamins    We have a long talk on 2 times daily Use in each nostril as directed 2 Bottle 1    [DISCONTINUED] vitamin D (ERGOCALCIFEROL) 1.25 MG (59707 UT) CAPS capsule Take 1 capsule by mouth once a week 12 capsule 0     No facility-administered encounter medications on file as of 10/23/2020. Return in about 4 weeks (around 11/20/2020).         Reviewed recent labs related to Cristiane's current problems      Discussed importance of regular Health Maintenance follow up  Health Maintenance   Topic    DTaP/Tdap/Td vaccine (1 - Tdap)    Breast cancer screen     Shingles Vaccine (1 of 2)    Colon cancer screen colonoscopy     DEXA (modify frequency per FRAX score)     Flu vaccine (1)    Lipid screen     Annual Wellness Visit (AWV)     Pneumococcal 65+ years Vaccine (2 of 2 - PPSV23)    Hepatitis C screen     Hepatitis A vaccine     Hepatitis B vaccine     Hib vaccine     Meningococcal (ACWY) vaccine

## 2020-10-23 NOTE — PATIENT INSTRUCTIONS
Patient Education        Learning About High Cholesterol  What is high cholesterol? High cholesterol means that you have too much cholesterol in your blood. Cholesterol is a type of fat. It's needed for many body functions, such as making new cells. Cholesterol is made by your body. It also comes from food you eat. Having high cholesterol can lead to the buildup of plaque in artery walls. This can increase your risk of heart disease and stroke. When your doctor talks about high cholesterol levels, he or she is talking about your total cholesterol and LDL cholesterol (the \"bad\" cholesterol) levels. Your doctor may also speak about HDL (the \"good\" cholesterol) levels. High HDL is linked with a lower risk for heart disease, heart attack, and stroke. Your cholesterol levels help your doctor find out your risk for having a heart attack or stroke. How can you prevent high cholesterol? A heart-healthy lifestyle can help you prevent high cholesterol. This lifestyle helps lower your risk for a heart attack and stroke. · Eat heart-healthy foods. ? Eat fruits, vegetables, whole grains (like oatmeal), dried beans and peas, nuts and seeds, soy products (like tofu), and fat-free or low-fat dairy products. ? Replace butter, margarine, and hydrogenated or partially hydrogenated oils with olive and canola oils. (Canola oil margarine without trans fat is fine.)  ? Replace red meat with fish, poultry, and soy protein (like tofu). ? Limit processed and packaged foods like chips, crackers, and cookies. · Be active. Exercise can improve your cholesterol level. Get at least 30 minutes of exercise on most days of the week. Walking is a good choice. You also may want to do other activities, such as running, swimming, cycling, or playing tennis or team sports. · Stay at a healthy weight. Lose weight if you need to. · Don't smoke. If you need help quitting, talk to your doctor about stop-smoking programs and medicines.  These can increase your chances of quitting for good. How is high cholesterol treated? The goal of treatment is to reduce your chances of having a heart attack or stroke. The goal is not to lower your cholesterol numbers only. · You may make lifestyle changes, such as eating healthy foods, not smoking, losing weight, and being more active. · You may have to take medicine. Follow-up care is a key part of your treatment and safety. Be sure to make and go to all appointments, and call your doctor if you are having problems. It's also a good idea to know your test results and keep a list of the medicines you take. Where can you learn more? Go to https://[email or web address].gepeDeepRockDrive.[email or web address] org and sign in to your [employer] account. Enter G083 in the Kick Sport box to learn more about \"Learning About High Cholesterol. \"     If you do not have an account, please click on the \"Sign Up Now\" link. Current as of: December 16, 2019               Content Version: 12.6  © 5498-3885 [employer]. Care instructions adapted under license by [facility] ([facility]). If you have questions about a medical condition or this instruction, always ask your healthcare professional. [place] [address] any warranty or liability for your use of this information.

## 2020-10-26 NOTE — TELEPHONE ENCOUNTER
Voicemail message left for patient that she does not need to hold any medications including Plavix.   Electronically signed by [facility] on 10/26/2020 at 8:46 AM

## 2020-11-03 NOTE — PROGRESS NOTES
Overdue results letter mailed to patient regarding cologuard order.   Electronically signed by Frederick Thompson on 11/3/2020 at 8:49 AM

## 2020-11-17 ENCOUNTER — TELEPHONE (OUTPATIENT)
Dept: NUTRITION | Age: 75
End: 2020-11-17

## 2020-11-17 NOTE — TELEPHONE ENCOUNTER
Patient No Show      Scheduled Date: 11/17/2020    Patient: Frantz Aragon    Referring Clinician: Dr. Jose David Alvarez      Dear Dr. Sekou Weiss,    Thank you for referring Frantz Aragon to [address] [place] [address] for outpatient nutrition counseling. Frantz Aragon did not keep scheduled appointment on 11/17/2020. Pt has hx of not coming to scheduled appts w/ me. If she is interested in nutrition counseling, she must contact me. If I can be of further assistance with this patient, please contact me.      Thank you,    Ike Aviles RD, [address] [place] [address]  Outpatient Dietitian   Phone: [phone]  Fax: [phone]

## 2020-11-20 ENCOUNTER — HOSPITAL ENCOUNTER (EMERGENCY)
Age: 75
Discharge: HOME | End: 2020-11-20
Payer: MEDICARE

## 2020-11-20 ENCOUNTER — APPOINTMENT (OUTPATIENT)
Dept: CT IMAGING | Age: 75
End: 2020-11-20
Payer: MEDICARE

## 2020-11-20 VITALS
RESPIRATION RATE: 18 BRPM | DIASTOLIC BLOOD PRESSURE: 86 MMHG | TEMPERATURE: 98 F | BODY MASS INDEX: 21.03 KG/M2 | SYSTOLIC BLOOD PRESSURE: 126 MMHG | WEIGHT: 115 LBS | OXYGEN SATURATION: 98 % | HEART RATE: 95 BPM

## 2020-11-20 LAB
ALBUMIN SERPL-MCNC: 4 G/DL (ref 3.5–5.2)
ALP BLD-CCNC: 129 U/L (ref 35–104)
ALT SERPL-CCNC: 9 U/L (ref 0–32)
ANION GAP SERPL CALCULATED.3IONS-SCNC: 11 MMOL/L (ref 7–16)
AST SERPL-CCNC: 15 U/L (ref 0–31)
BACTERIA: ABNORMAL /HPF
BASOPHILS ABSOLUTE: 0.03 E9/L (ref 0–0.2)
BASOPHILS RELATIVE PERCENT: 0.2 % (ref 0–2)
BILIRUB SERPL-MCNC: 0.4 MG/DL (ref 0–1.2)
BILIRUBIN URINE: ABNORMAL
BLOOD, URINE: ABNORMAL
BUN BLDV-MCNC: 14 MG/DL (ref 8–23)
CALCIUM SERPL-MCNC: 9.9 MG/DL (ref 8.6–10.2)
CHLORIDE BLD-SCNC: 99 MMOL/L (ref 98–107)
CLARITY: CLEAR
CO2: 26 MMOL/L (ref 22–29)
COLOR: YELLOW
CREAT SERPL-MCNC: 0.8 MG/DL (ref 0.5–1)
EOSINOPHILS ABSOLUTE: 0.03 E9/L (ref 0.05–0.5)
EOSINOPHILS RELATIVE PERCENT: 0.2 % (ref 0–6)
GFR AFRICAN AMERICAN: >60
GFR NON-AFRICAN AMERICAN: >60 ML/MIN/1.73
GLUCOSE BLD-MCNC: 119 MG/DL (ref 74–99)
GLUCOSE URINE: NEGATIVE MG/DL
HCT VFR BLD CALC: 38.5 % (ref 34–48)
HEMOGLOBIN: 12.9 G/DL (ref 11.5–15.5)
IMMATURE GRANULOCYTES #: 0.05 E9/L
IMMATURE GRANULOCYTES %: 0.4 % (ref 0–5)
KETONES, URINE: NEGATIVE MG/DL
LACTIC ACID: 0.9 MMOL/L (ref 0.5–2.2)
LEUKOCYTE ESTERASE, URINE: ABNORMAL
LIPASE: 16 U/L (ref 13–60)
LYMPHOCYTES ABSOLUTE: 1.21 E9/L (ref 1.5–4)
LYMPHOCYTES RELATIVE PERCENT: 9.7 % (ref 20–42)
MCH RBC QN AUTO: 30.3 PG (ref 26–35)
MCHC RBC AUTO-ENTMCNC: 33.5 % (ref 32–34.5)
MCV RBC AUTO: 90.4 FL (ref 80–99.9)
MONOCYTES ABSOLUTE: 0.83 E9/L (ref 0.1–0.95)
MONOCYTES RELATIVE PERCENT: 6.6 % (ref 2–12)
NEUTROPHILS ABSOLUTE: 10.35 E9/L (ref 1.8–7.3)
NEUTROPHILS RELATIVE PERCENT: 82.9 % (ref 43–80)
NITRITE, URINE: NEGATIVE
PDW BLD-RTO: 13.6 FL (ref 11.5–15)
PH UA: 5.5 (ref 5–9)
PLATELET # BLD: 395 E9/L (ref 130–450)
PMV BLD AUTO: 8.8 FL (ref 7–12)
POTASSIUM SERPL-SCNC: 4.5 MMOL/L (ref 3.5–5)
PROTEIN UA: ABNORMAL MG/DL
RBC # BLD: 4.26 E12/L (ref 3.5–5.5)
RBC UA: ABNORMAL /HPF (ref 0–2)
SODIUM BLD-SCNC: 136 MMOL/L (ref 132–146)
SPECIFIC GRAVITY UA: 1.02 (ref 1–1.03)
TOTAL PROTEIN: 8 G/DL (ref 6.4–8.3)
UROBILINOGEN, URINE: 0.2 E.U./DL
WBC # BLD: 12.5 E9/L (ref 4.5–11.5)
WBC UA: ABNORMAL /HPF (ref 0–5)

## 2020-11-20 PROCEDURE — 99283 EMERGENCY DEPT VISIT LOW MDM: CPT

## 2020-11-20 PROCEDURE — 81001 URINALYSIS AUTO W/SCOPE: CPT

## 2020-11-20 PROCEDURE — 6370000000 HC RX 637 (ALT 250 FOR IP): Performed by: NURSE PRACTITIONER

## 2020-11-20 PROCEDURE — 74177 CT ABD & PELVIS W/CONTRAST: CPT

## 2020-11-20 PROCEDURE — 83690 ASSAY OF LIPASE: CPT

## 2020-11-20 PROCEDURE — 36415 COLL VENOUS BLD VENIPUNCTURE: CPT

## 2020-11-20 PROCEDURE — 6360000004 HC RX CONTRAST MEDICATION: Performed by: RADIOLOGY

## 2020-11-20 PROCEDURE — 85025 COMPLETE CBC W/AUTO DIFF WBC: CPT

## 2020-11-20 PROCEDURE — 80053 COMPREHEN METABOLIC PANEL: CPT

## 2020-11-20 PROCEDURE — 83605 ASSAY OF LACTIC ACID: CPT

## 2020-11-20 RX ORDER — METRONIDAZOLE 500 MG/1
500 TABLET ORAL 2 TIMES DAILY
Qty: 14 TABLET | Refills: 0 | Status: SHIPPED | OUTPATIENT
Start: 2020-11-20 | End: 2020-11-27

## 2020-11-20 RX ORDER — CEFDINIR 300 MG/1
300 CAPSULE ORAL 2 TIMES DAILY
Qty: 14 CAPSULE | Refills: 0 | Status: SHIPPED | OUTPATIENT
Start: 2020-11-20 | End: 2020-11-27

## 2020-11-20 RX ORDER — CEFDINIR 300 MG/1
300 CAPSULE ORAL ONCE
Status: COMPLETED | OUTPATIENT
Start: 2020-11-20 | End: 2020-11-20

## 2020-11-20 RX ORDER — METRONIDAZOLE 500 MG/1
500 TABLET ORAL ONCE
Status: COMPLETED | OUTPATIENT
Start: 2020-11-20 | End: 2020-11-20

## 2020-11-20 RX ADMIN — IOPAMIDOL 75 ML: 755 INJECTION, SOLUTION INTRAVENOUS at 19:43

## 2020-11-20 RX ADMIN — CEFDINIR 300 MG: 300 CAPSULE ORAL at 20:14

## 2020-11-20 RX ADMIN — METRONIDAZOLE 500 MG: 500 TABLET ORAL at 20:14

## 2020-11-20 ASSESSMENT — PAIN DESCRIPTION - FREQUENCY: FREQUENCY: CONTINUOUS

## 2020-11-20 ASSESSMENT — PAIN DESCRIPTION - DESCRIPTORS: DESCRIPTORS: SORE

## 2020-11-20 ASSESSMENT — PAIN DESCRIPTION - ORIENTATION: ORIENTATION: LOWER

## 2020-11-20 ASSESSMENT — PAIN DESCRIPTION - PAIN TYPE: TYPE: ACUTE PAIN

## 2020-11-20 ASSESSMENT — PAIN SCALES - GENERAL: PAINLEVEL_OUTOF10: 3

## 2020-11-20 ASSESSMENT — PAIN DESCRIPTION - LOCATION: LOCATION: ABDOMEN

## 2020-11-21 NOTE — ED PROVIDER NOTES
35.0 pg    MCHC 33.5 32.0 - 34.5 %    RDW 13.6 11.5 - 15.0 fL    Platelets 957 316 - 479 E9/L    MPV 8.8 7.0 - 12.0 fL    Neutrophils % 82.9 (H) 43.0 - 80.0 %    Immature Granulocytes % 0.4 0.0 - 5.0 %    Lymphocytes % 9.7 (L) 20.0 - 42.0 %    Monocytes % 6.6 2.0 - 12.0 %    Eosinophils % 0.2 0.0 - 6.0 %    Basophils % 0.2 0.0 - 2.0 %    Neutrophils Absolute 10.35 (H) 1.80 - 7.30 E9/L    Immature Granulocytes # 0.05 E9/L    Lymphocytes Absolute 1.21 (L) 1.50 - 4.00 E9/L    Monocytes Absolute 0.83 0.10 - 0.95 E9/L    Eosinophils Absolute 0.03 (L) 0.05 - 0.50 E9/L    Basophils Absolute 0.03 0.00 - 0.20 E9/L   Comprehensive Metabolic Panel   Result Value Ref Range    Sodium 136 132 - 146 mmol/L    Potassium 4.5 3.5 - 5.0 mmol/L    Chloride 99 98 - 107 mmol/L    CO2 26 22 - 29 mmol/L    Anion Gap 11 7 - 16 mmol/L    Glucose 119 (H) 74 - 99 mg/dL    BUN 14 8 - 23 mg/dL    CREATININE 0.8 0.5 - 1.0 mg/dL    GFR Non-African American >60 >=60 mL/min/1.73    GFR African American >60     Calcium 9.9 8.6 - 10.2 mg/dL    Total Protein 8.0 6.4 - 8.3 g/dL    Alb 4.0 3.5 - 5.2 g/dL    Total Bilirubin 0.4 0.0 - 1.2 mg/dL    Alkaline Phosphatase 129 (H) 35 - 104 U/L    ALT 9 0 - 32 U/L    AST 15 0 - 31 U/L   Lactic Acid, Plasma   Result Value Ref Range    Lactic Acid 0.9 0.5 - 2.2 mmol/L   Lipase   Result Value Ref Range    Lipase 16 13 - 60 U/L   Urinalysis   Result Value Ref Range    Color, UA Yellow Straw/Yellow    Clarity, UA Clear Clear    Glucose, Ur Negative Negative mg/dL    Bilirubin Urine SMALL (A) Negative    Ketones, Urine Negative Negative mg/dL    Specific Gravity, UA 1.020 1.005 - 1.030    Blood, Urine LARGE (A) Negative    pH, UA 5.5 5.0 - 9.0    Protein, UA TRACE Negative mg/dL    Urobilinogen, Urine 0.2 <2.0 E.U./dL    Nitrite, Urine Negative Negative    Leukocyte Esterase, Urine TRACE (A) Negative       RADIOLOGY:  Interpreted by Radiologist.  CT ABDOMEN PELVIS W IV CONTRAST Additional Contrast? None   Final Result 1.  Inflammatory changes associated with sigmoid colon with multiple   diverticula. Findings could suggest acute diverticulitis, colitis, or   neoplasm. Short-term follow-up recommended. 2.  Large amount of stool throughout colon. 3.  Small free fluid located in pelvis. No evidence of abscess or free air. 4.  Cystic lesion associated with pancreatic head could suggest pancreatic   pseudocyst or cystic pancreatic neoplasm. MRI with and without contrast   could be helpful for further evaluation.          ------------------------- NURSING NOTES AND VITALS REVIEWED ---------------------------   The nursing notes within the ED encounter and vital signs as below have been reviewed. /86   Pulse 95   Temp 98 °F (36.7 °C) (Infrared)   Resp 18   Wt 115 lb (52.2 kg)   LMP  (LMP Unknown)   SpO2 98%   BMI 21.03 kg/m²   Oxygen Saturation Interpretation: Normal      ---------------------------------------------------PHYSICAL EXAM--------------------------------------      Constitutional/General: Alert and oriented x3, well appearing, non toxic in NAD  Head: NC/AT  Eyes: PERRL, EOMI  Mouth: Oropharynx clear, handling secretions, no trismus  Neck: Supple, full ROM, no meningeal signs  Pulmonary: Lungs clear to auscultation bilaterally, no wheezes, rales, or rhonchi. Not in respiratory distress  Cardiovascular:  Regular rate and rhythm, no murmurs, gallops, or rubs. 2+ distal pulses  Abdomen: Soft, non distended, mild tenderness to the left lower abdomen. Normal active bowel sounds. Extremities: Moves all extremities x 4.  Warm and well perfused  Skin: warm and dry without rash  Neurologic: GCS 15,  Psych: Normal Affect      ------------------------------ ED COURSE/MEDICAL DECISION MAKING----------------------  Medications   iopamidol (ISOVUE-370) 76 % injection 75 mL (75 mLs Intravenous Given 11/20/20 1943)   metroNIDAZOLE (FLAGYL) tablet 500 mg (500 mg Oral Given 11/20/20 2014)   cefdinir (OMNICEF) capsule 300 mg (300 mg Oral Given 11/20/20 2014)         Medical Decision Making:    Time: 2020  Re-evaluation. Patients symptoms show no change  Repeat physical examination is not changed, pain medication was offered however declined. I discussed with the patient at length her abnormal CT findings showing a concern for diverticulitis as well as concern for a pancreatic lesion. Recommend following up with primary care physician for GI referral beginning of the week regarding the pancreatic lesion. She will be started on antibiotic of Omnicef and Flagyl for the diverticulitis. Counseling: The emergency provider has spoken with the patient and discussed todays results, in addition to providing specific details for the plan of care and counseling regarding the diagnosis and prognosis. Questions are answered at this time and they are agreeable with the plan.      --------------------------------- IMPRESSION AND DISPOSITION ---------------------------------    IMPRESSION  1. Diverticulitis of colon    2.  Lesion of pancreas        DISPOSITION  Disposition: Discharge to home  Patient condition is good                 MAYANK Eric - CNP  11/20/20 2021

## 2020-11-28 ENCOUNTER — APPOINTMENT (OUTPATIENT)
Dept: GENERAL RADIOLOGY | Age: 75
End: 2020-11-28
Payer: MEDICARE

## 2020-11-28 ENCOUNTER — HOSPITAL ENCOUNTER (EMERGENCY)
Age: 75
Discharge: HOME | End: 2020-11-28
Attending: FAMILY MEDICINE
Payer: MEDICARE

## 2020-11-28 VITALS
DIASTOLIC BLOOD PRESSURE: 74 MMHG | HEART RATE: 82 BPM | WEIGHT: 118 LBS | TEMPERATURE: 98 F | HEIGHT: 62 IN | OXYGEN SATURATION: 94 % | SYSTOLIC BLOOD PRESSURE: 126 MMHG | RESPIRATION RATE: 16 BRPM | BODY MASS INDEX: 21.71 KG/M2

## 2020-11-28 LAB
ALBUMIN SERPL-MCNC: 2.8 G/DL (ref 3.5–5.2)
ALP BLD-CCNC: 83 U/L (ref 35–104)
ALT SERPL-CCNC: 6 U/L (ref 0–32)
ANION GAP SERPL CALCULATED.3IONS-SCNC: 11 MMOL/L (ref 7–16)
AST SERPL-CCNC: 17 U/L (ref 0–31)
BACTERIA: ABNORMAL /HPF
BASOPHILS ABSOLUTE: 0 E9/L (ref 0–0.2)
BASOPHILS RELATIVE PERCENT: 0 % (ref 0–2)
BILIRUB SERPL-MCNC: 0.3 MG/DL (ref 0–1.2)
BILIRUBIN URINE: NEGATIVE
BLOOD, URINE: ABNORMAL
BUN BLDV-MCNC: 17 MG/DL (ref 8–23)
CALCIUM SERPL-MCNC: 9.6 MG/DL (ref 8.6–10.2)
CHLORIDE BLD-SCNC: 97 MMOL/L (ref 98–107)
CLARITY: CLEAR
CO2: 26 MMOL/L (ref 22–29)
COLOR: YELLOW
CREAT SERPL-MCNC: 0.5 MG/DL (ref 0.5–1)
EOSINOPHILS ABSOLUTE: 0.12 E9/L (ref 0.05–0.5)
EOSINOPHILS RELATIVE PERCENT: 1 % (ref 0–6)
GFR AFRICAN AMERICAN: >60
GFR NON-AFRICAN AMERICAN: >60 ML/MIN/1.73
GLUCOSE BLD-MCNC: 117 MG/DL (ref 74–99)
GLUCOSE URINE: NEGATIVE MG/DL
HCT VFR BLD CALC: 39.3 % (ref 34–48)
HEMOGLOBIN: 13.1 G/DL (ref 11.5–15.5)
KETONES, URINE: ABNORMAL MG/DL
LACTIC ACID: 1.1 MMOL/L (ref 0.5–2.2)
LEUKOCYTE ESTERASE, URINE: NEGATIVE
LYMPHOCYTES ABSOLUTE: 1.72 E9/L (ref 1.5–4)
LYMPHOCYTES RELATIVE PERCENT: 14 % (ref 20–42)
MCH RBC QN AUTO: 29.2 PG (ref 26–35)
MCHC RBC AUTO-ENTMCNC: 33.3 % (ref 32–34.5)
MCV RBC AUTO: 87.7 FL (ref 80–99.9)
METAMYELOCYTES RELATIVE PERCENT: 1 % (ref 0–1)
MONOCYTES ABSOLUTE: 0.12 E9/L (ref 0.1–0.95)
MONOCYTES RELATIVE PERCENT: 1 % (ref 2–12)
NEUTROPHILS ABSOLUTE: 10.33 E9/L (ref 1.8–7.3)
NEUTROPHILS RELATIVE PERCENT: 83 % (ref 43–80)
NITRITE, URINE: POSITIVE
PDW BLD-RTO: 14.2 FL (ref 11.5–15)
PH UA: 6 (ref 5–9)
PLATELET # BLD: 556 E9/L (ref 130–450)
PMV BLD AUTO: 8.5 FL (ref 7–12)
POTASSIUM REFLEX MAGNESIUM: 3.7 MMOL/L (ref 3.5–5)
PROTEIN UA: 30 MG/DL
RBC # BLD: 4.48 E12/L (ref 3.5–5.5)
RBC UA: ABNORMAL /HPF (ref 0–2)
SODIUM BLD-SCNC: 134 MMOL/L (ref 132–146)
SPECIFIC GRAVITY UA: 1.02 (ref 1–1.03)
TOTAL PROTEIN: 7 G/DL (ref 6.4–8.3)
TROPONIN: <0.01 NG/ML (ref 0–0.03)
UROBILINOGEN, URINE: 0.2 E.U./DL
WBC # BLD: 12.3 E9/L (ref 4.5–11.5)
WBC UA: ABNORMAL /HPF (ref 0–5)

## 2020-11-28 PROCEDURE — 2580000003 HC RX 258: Performed by: FAMILY MEDICINE

## 2020-11-28 PROCEDURE — 93005 ELECTROCARDIOGRAM TRACING: CPT | Performed by: FAMILY MEDICINE

## 2020-11-28 PROCEDURE — 81001 URINALYSIS AUTO W/SCOPE: CPT

## 2020-11-28 PROCEDURE — 71045 X-RAY EXAM CHEST 1 VIEW: CPT

## 2020-11-28 PROCEDURE — 80053 COMPREHEN METABOLIC PANEL: CPT

## 2020-11-28 PROCEDURE — 85025 COMPLETE CBC W/AUTO DIFF WBC: CPT

## 2020-11-28 PROCEDURE — 84484 ASSAY OF TROPONIN QUANT: CPT

## 2020-11-28 PROCEDURE — 83605 ASSAY OF LACTIC ACID: CPT

## 2020-11-28 PROCEDURE — 99283 EMERGENCY DEPT VISIT LOW MDM: CPT

## 2020-11-28 PROCEDURE — 36415 COLL VENOUS BLD VENIPUNCTURE: CPT

## 2020-11-28 RX ORDER — LEVOFLOXACIN 500 MG/1
500 TABLET, FILM COATED ORAL DAILY
Qty: 10 TABLET | Refills: 0 | Status: SHIPPED | OUTPATIENT
Start: 2020-11-28 | End: 2020-12-08

## 2020-11-28 RX ORDER — 0.9 % SODIUM CHLORIDE 0.9 %
1000 INTRAVENOUS SOLUTION INTRAVENOUS ONCE
Status: COMPLETED | OUTPATIENT
Start: 2020-11-28 | End: 2020-11-28

## 2020-11-28 RX ADMIN — SODIUM CHLORIDE 1000 ML: 9 INJECTION, SOLUTION INTRAVENOUS at 13:53

## 2020-11-28 NOTE — ED PROVIDER NOTES
HPI:  11/28/20,   Time: 1:02 PM REAL Zamora is a 70 y.o. female presenting to the ED for 3 to 4 days of feeling intermittently weak in the legs, causes her to almost fall. She has not fallen to the floor hit her head. She just feels weak overall and. She denies cough or shortness of breath. She denies diarrhea or constipation. She denies nausea or vomiting. ROS:   Pertinent positives and negatives are stated within HPI, all other systems reviewed and are negative.  --------------------------------------------- PAST HISTORY ---------------------------------------------  Past Medical History:  has a past medical history of Cerebral artery occlusion with cerebral infarction ([facility] [address] and Hyperlipidemia. Past Surgical History:  has a past surgical history that includes Hysterectomy; Hysterectomy, vaginal (2014); and Brain meningioma excision. Social History:  reports that she is a non-smoker but has been exposed to tobacco smoke. She has never used smokeless tobacco. She reports that she does not drink alcohol or use drugs. Family History: family history is not on file. The patients home medications have been reviewed. Allergies: Patient has no known allergies.     -------------------------------------------------- RESULTS -------------------------------------------------  All laboratory and radiology results have been personally reviewed by myself   LABS:  Results for orders placed or performed during the hospital encounter of 11/28/20   CBC Auto Differential   Result Value Ref Range    WBC 12.3 (H) 4.5 - 11.5 E9/L    RBC 4.48 3.50 - 5.50 E12/L    Hemoglobin 13.1 11.5 - 15.5 g/dL    Hematocrit 39.3 34.0 - 48.0 %    MCV 87.7 80.0 - 99.9 fL    MCH 29.2 26.0 - 35.0 pg    MCHC 33.3 32.0 - 34.5 %    RDW 14.2 11.5 - 15.0 fL    Platelets 362 (H) 016 - 450 E9/L    MPV 8.5 7.0 - 12.0 fL   Troponin   Result Value Ref Range    Troponin <0.01 0.00 - 0.03 ng/mL   EKG 12 Lead   Result Value Ref Range    Ventricular Rate 92 BPM    Atrial Rate 92 BPM    P-R Interval 114 ms    QRS Duration 98 ms    Q-T Interval 362 ms    QTc Calculation (Bazett) 447 ms    P Axis 33 degrees    R Axis -6 degrees    T Axis 27 degrees       RADIOLOGY:  Interpreted by Radiologist.  XR CHEST PORTABLE   Final Result   Left lower lobe infiltrate and small left pleural effusion          ------------------------- NURSING NOTES AND VITALS REVIEWED ---------------------------   The nursing notes within the ED encounter and vital signs as below have been reviewed. /74   Pulse 82   Temp 98 °F (36.7 °C) (Temporal)   Resp 16   Ht 5' 2\" (1.575 m)   Wt 118 lb (53.5 kg)   LMP  (LMP Unknown)   SpO2 94%   BMI 21.58 kg/m²   Oxygen Saturation Interpretation: Normal      ---------------------------------------------------PHYSICAL EXAM--------------------------------------    Constitutional/General: Alert and oriented x3, well appearing, non toxic in NAD  Head: NC/AT  Eyes: PERRL, EOMI  Mouth: Oropharynx clear, handling secretions, no trismus  Neck: Supple, full ROM, no meningeal signs  Pulmonary: Lungs clear to auscultation bilaterally, no wheezes, rales, or rhonchi. Not in respiratory distress  Cardiovascular:  Regular rate and rhythm, no murmurs, gallops, or rubs. 2+ distal pulses  Abdomen: Soft, non tender, non distended,   Extremities: Moves all extremities x 4. Warm and well perfused  Skin: warm and dry without rash  Neurologic: GCS 15,  Psych: Normal Affect      ------------------------------ ED COURSE/MEDICAL DECISION MAKING----------------------  Medications   0.9 % sodium chloride bolus (1,000 mLs Intravenous New Bag 11/28/20 8322)         Medical Decision Making:    Straightforward    EKG: This EKG is signed and interpreted by me. Rate: 92  Rhythm: Sinus  Interpretation: Normal sinus rhythm    The patient's white blood cell count is mildly elevated at 12. 3. Her pulse and respirations are within normal limits. Her chest x-ray shows a left lower lobe infiltrate. We discussed these findings and will treat the patient for pneumonia as an outpatient. She was advised to return the ED if any worsening symptoms and follow-up with her primary care physician as soon as possible. Counseling: The emergency provider has spoken with the patient and discussed todays results, in addition to providing specific details for the plan of care and counseling regarding the diagnosis and prognosis. Questions are answered at this time and they are agreeable with the plan.      --------------------------------- IMPRESSION AND DISPOSITION ---------------------------------    IMPRESSION  1.  Pneumonia due to organism        DISPOSITION  Disposition: Discharge to home  Patient condition is stable             Kacy Guerrero MD  12/01/20 1205

## 2020-11-29 LAB
EKG ATRIAL RATE: 92 BPM
EKG P AXIS: 33 DEGREES
EKG P-R INTERVAL: 114 MS
EKG Q-T INTERVAL: 362 MS
EKG QRS DURATION: 98 MS
EKG QTC CALCULATION (BAZETT): 447 MS
EKG R AXIS: -6 DEGREES
EKG T AXIS: 27 DEGREES
EKG VENTRICULAR RATE: 92 BPM

## 2020-11-29 PROCEDURE — 93010 ELECTROCARDIOGRAM REPORT: CPT | Performed by: INTERNAL MEDICINE

## 2020-12-02 ENCOUNTER — TELEPHONE (OUTPATIENT)
Dept: FAMILY MEDICINE CLINIC | Age: 75
End: 2020-12-02

## 2020-12-02 ENCOUNTER — TELEPHONE (OUTPATIENT)
Dept: NEUROLOGY | Age: 75
End: 2020-12-02

## 2020-12-02 ENCOUNTER — OFFICE VISIT (OUTPATIENT)
Dept: FAMILY MEDICINE CLINIC | Age: 75
End: 2020-12-02
Payer: MEDICARE

## 2020-12-02 PROCEDURE — 99442 PR PHYS/QHP TELEPHONE EVALUATION 11-20 MIN: CPT | Performed by: FAMILY MEDICINE

## 2020-12-02 NOTE — TELEPHONE ENCOUNTER
Pt has not been seen since 2019. She would like to have Dr Marquis Sol discuss her medications she is on as they are causing her to be dizzy after taking them. She was just in the ED for pneumonia but would be willing to do a phone or possibly a virtual visit to discuss. Would that be okay? Please review and advise.  Thanks

## 2020-12-03 ENCOUNTER — TELEPHONE (OUTPATIENT)
Dept: FAMILY MEDICINE CLINIC | Age: 75
End: 2020-12-03

## 2020-12-03 DIAGNOSIS — J18.9 PNEUMONIA OF LEFT LOWER LOBE DUE TO INFECTIOUS ORGANISM: ICD-10-CM

## 2020-12-03 DIAGNOSIS — U07.1 COVID-19: ICD-10-CM

## 2020-12-03 NOTE — TELEPHONE ENCOUNTER
Pt asking for wheelchair. This will need to be in her 12/02/2020 office note for medical necessity.     Electronically signed by Bhavna Contreras MA on 12/3/20 at 10:56 AM EST

## 2020-12-04 ENCOUNTER — VIRTUAL VISIT (OUTPATIENT)
Dept: NEUROLOGY | Age: 75
End: 2020-12-04
Payer: MEDICARE

## 2020-12-04 PROBLEM — I69.354 HEMIPARESIS AFFECTING LEFT SIDE AS LATE EFFECT OF STROKE (HCC): Chronic | Status: ACTIVE | Noted: 2020-12-04

## 2020-12-04 PROBLEM — G40.109 LOCALIZATION-RELATED EPILEPSY (HCC): Chronic | Status: ACTIVE | Noted: 2019-06-25

## 2020-12-04 PROCEDURE — G8400 PT W/DXA NO RESULTS DOC: HCPCS | Performed by: PSYCHIATRY & NEUROLOGY

## 2020-12-04 PROCEDURE — 1123F ACP DISCUSS/DSCN MKR DOCD: CPT | Performed by: PSYCHIATRY & NEUROLOGY

## 2020-12-04 PROCEDURE — 99214 OFFICE O/P EST MOD 30 MIN: CPT | Performed by: PSYCHIATRY & NEUROLOGY

## 2020-12-04 PROCEDURE — 4040F PNEUMOC VAC/ADMIN/RCVD: CPT | Performed by: PSYCHIATRY & NEUROLOGY

## 2020-12-04 PROCEDURE — 3017F COLORECTAL CA SCREEN DOC REV: CPT | Performed by: PSYCHIATRY & NEUROLOGY

## 2020-12-04 PROCEDURE — G8427 DOCREV CUR MEDS BY ELIG CLIN: HCPCS | Performed by: PSYCHIATRY & NEUROLOGY

## 2020-12-04 PROCEDURE — 1090F PRES/ABSN URINE INCON ASSESS: CPT | Performed by: PSYCHIATRY & NEUROLOGY

## 2020-12-04 RX ORDER — LAMOTRIGINE 100 MG/1
100 TABLET ORAL 2 TIMES DAILY
Qty: 60 TABLET | Refills: 5 | Status: SHIPPED
Start: 2020-12-04 | End: 2021-10-08 | Stop reason: SDUPTHER

## 2020-12-04 NOTE — PROGRESS NOTES
[facility] [address] NEUROLOGY  [address] [place] [address]  Dept: [phone]    Telehealth follow up Note      Narciso Valle     Date of Visit:  12/4/2020  Primary Provider: Soni Jones MD    CC:  Telehealth Neurology follow up annual OV, s/p rt. Pontine lacunar stroke. History of meningioma resection in 2002, focal onset seizure disorder, denies recurrent seizures or confusional episodes. Consent:  The patient and/or health care decision maker is aware that that he may receive a bill for this tele-health service [employer], depending on his insurance coverage, and has provided verbal consent to proceed: Yes  My patient is aware that they will need a follow-up visit (in-person or virtually) at the appropriate time indicated for continued medications. Further, my patient is aware that when this acute crisis has lifted, they will be expected to return for an in-person visit and all elements of standard local and hospital guidelines in order to continue this medication. HPI: 26-year-old woman presenting for annual follow-up visit status post right pontine ischemic lacunar stroke presenting with left-sided motor weakness and mild enunciation difficulty consistent with dysarthria. She also has a history of prior meningioma diagnosis status post resection treated with anticonvulsant medications of Lamictal and Keppra for seizure prophylaxis and aspirin 81 mg daily and Plavix 75 mg daily for stroke prophylaxis. She was initially sent to me for consultation status post right pontine ischemic lacunar stroke, seen on 11/4/2019. Daughter reports she held the a.m. seizure meds. Of Lamictal and Keppra x 2 days since her mother experienced increased dizziness and lightheadedness and was recently diagnosed with pneumonia and diverticulitis. Diverticulitis may slow the transit time or alter absorption of the medication in her GI tract.       She and her daughter report no recurrent seizures or confusional episodes to date. She has a history of meningioma resection in 2002 and focal onset seizure disorder. An EEG obtained at [facility] in June 2019 showed mild to moderate right temporal slowing suggesting a right-sided focal lesion and no epileptiform features were noted. Residual stroke symptoms of left-sided hemiparesis is unchanged. Appropriate seizure control with current medications regimen: yes -was previously taking Lamictal 200 mg twice daily and Keppra 500 mg twice daily. Daughter withheld seizure medication a.m. doses for 2 days due to her complaint of dizziness which apparently improved and resolved. Advised her to continue Keppra 500 mg twice daily and decrease Lamictal to 100 mg twice daily and have advised the Lamictal prescription. Change in quality of symptoms:no. Medication side effects:dizziness/lightheadedness. Recent diagnostic testing: Labs, recent diagnosis of pneumonia and diverticulitis per daughter. .     She has been on anticoagulation medications to include ASA. The patient  has not been on herbal supplements. The patient has glucose impairment. Lab data: Reviewed from 11/28/2020. Blood glucose 117, sodium 134, calcium 9.6, normal BUN and creatinine, normal LFTs, CBC notable for WBC of 12.3, absolute neutrophil count 10.33. Please refer to prior Neurology consult letter of 11/4/2019 for additional information. ROS, family/social history, medication/allergy list: Reviewed, updated. Past Medical History: Reviewed, updated.   Past Medical History:   Diagnosis Date    Cerebral artery occlusion with cerebral infarction ([address]     Hemiparesis affecting left side as late effect of stroke ([address] 12/4/2020    Hyperlipidemia     Localization-related epilepsy ([address] 6/25/2019     Patient Active Problem List   Diagnosis    Right pontine stroke ([address]    Localization-related epilepsy ([address]    History of resection of meningioma  Elevated alkaline phosphatase level    Primary insomnia    Other fatigue    Serum calcium elevated    Vitamin D deficiency    Mixed hyperlipidemia    History of meningioma    Hoarseness    Hemiparesis affecting left side as late effect of stroke (HCC)     Past Surgical History: Reviewed   Past Surgical History:   Procedure Laterality Date    BRAIN MENINGIOMA EXCISION      HYSTERECTOMY      HYSTERECTOMY, VAGINAL  2014    Total     Home Medications: Reviewed    Current Outpatient Medications   Medication Sig Dispense Refill    lamoTRIgine (LAMICTAL) 100 MG tablet Take 1 tablet by mouth 2 times daily 60 tablet 5    levoFLOXacin (LEVAQUIN) 500 MG tablet Take 1 tablet by mouth daily for 10 days 10 tablet 0    levETIRAcetam (KEPPRA) 500 MG tablet TAKE 1 TABLET BY MOUTH TWO  TIMES DAILY 180 tablet 1    clopidogrel (PLAVIX) 75 MG tablet TAKE 1 TABLET BY MOUTH ONCE DAILY 90 tablet 1    atorvastatin (LIPITOR) 40 MG tablet Take 1 tablet by mouth nightly 90 tablet 0    folic acid (FOLVITE) 1 MG tablet Take 1 tablet by mouth daily 90 tablet 1    ibuprofen (IBUPROFEN 100 JESSICA STRENGTH) 100 MG chewable tablet Take 100 mg by mouth every 8 hours as needed for Pain      aspirin 81 MG chewable tablet Take 1 tablet by mouth daily 90 tablet 0     No current facility-administered medications for this visit. Allergies: Reviewed     Social History: Reviewed     REVIEW OF SYSTEMS:     Jose Luis Han denies fever/chills, chest pain, shortness of breath, new bowel or bladder complaints. All other review of systems was negative. Denies recurrent clinical seizures or confusional episodes. No recurrent TIA/stroke symptoms. VIRTUAL NEUROLOGIC EXAMINATION:      General:      Build:Normal Weight  A & O x3. Speech is mildly dysarthric with mild enunciation difficulties and hypophonic. Language functions grossly fluent. Comprehension abilities grossly intact. Affect and mood appear appropriate.   CN II-XII: Pupils appear equal and reactive to light. EOMs are grossly intact in all directions of gaze with no apparent nystagmus. Visual fields remain full. Facial expression is decreased. Previous suggestion of mild right central facial paresis with decreased right nasolabial fold and widened right palpebral fissure probably related to prior stroke. Hearing grossly intact. Tongue midline. Motor/Sensory Exam: Grossly intact strength in the upper and lower extremities however mildly decreased fine motor function of the fingers of the left hand as compared to the right. Denies subjective focal sensory deficits, no right/left confusion. Coordination: Mild limb dysmetria on finger-to-nose testing of left upper extremity but may be related also to underlying mild residual motor weakness. No intention tremors. No history of gait ataxia. HEENT:    Head:normocephalic and atraumatic  Pupils:normal, regular, round and equal.  Sclera: icterus absent    Lungs:    Breathing:Normal expansion. No dyspnea or increased respiratory rate observed. Recent diagnosis of pneumonia. Musculoskeletal:    SLR:not done right, not done left, sitting     Extremities:    Tremors:None bilaterally upper and lower  Range of motion:Generally normal     Dermatology:    Skin:no skin lesions    Impression/Plan:  1. Status post right pontine ischemic lacunar stroke in June 2019 with minimal left hemiparesis and dysarthria and good clinical recovery to date. 2. History of remote meningioma resection in 2002 associated with a focal onset seizure disorder, reporting no recurrent clinical seizures or confusional episodes. However, recent pneumonia and diverticulitis diagnosis as described by her daughter, with symptoms of dizziness and lightheadedness, may have indicated AED toxicity, thus she is recommended at this time to decrease her Lamictal prescription to 100 mg twice daily and continue Keppra 500 mg twice daily. 3. In 7 days approximately, an a.m. trough lamotrigine and levetiracetam level is ordered, CBC with differential and CMP. 4. Maintain seizure precautions, observation for recurrent or breakthrough seizures. 5. She continues aspirin 81 mg daily and Plavix 75 mg daily for stroke prophylaxis. 6. A follow-up virtual video Neurology clinic visit is recommended in 3 months and her daughter is encouraged to call the office and report her progress and response to the seizure medication change. Patient advised regarding steps to help prevent the spread of COVID-19   SOURCE - https://andria-maria.info/. html     1-Stay home except to get medical care  2-Clean your hands often for at least 20 seconds, avoid touching: Avoid touching your eyes, nose, and mouth with unwashed hands. 3-Seek medical attention: Seek prompt medical attention if your illness is worsening (e.g., difficulty breathing). Call you doctor first.  3-Wear a facemask if you are sick   4-Cover your coughs and sneezes           I affirm this is a Patient Initiated Episode with an established Patient who has not had a related appointment within my department in the past 7 days or scheduled within the next 24 hours. Note: This visit was completed virtually using DO[employer]. ME    Total Time: 35 mins. Patient location: Home with daughter  Provider location: Physician office    Cc: Referring physician    Quiana Madsen M.D. Orders Placed This Encounter   Procedures    Levetiracetam Level     A.m. trough level before 1st dose of med.      Standing Status:   Future     Number of Occurrences:   1     Standing Expiration Date:   1/4/2021    Lamotrigine Level     A.m. trough level before 1st dose of med     Standing Status:   Future     Number of Occurrences:   1     Standing Expiration Date:   1/4/2021

## 2020-12-05 LAB
SARS-COV-2: NOT DETECTED
SOURCE: NORMAL

## 2020-12-07 PROBLEM — R42 VERTIGO: Status: ACTIVE | Noted: 2020-12-07

## 2020-12-07 PROBLEM — K57.92 DIVERTICULITIS: Status: ACTIVE | Noted: 2020-12-07

## 2020-12-07 PROBLEM — J18.9 PNEUMONIA OF LEFT LOWER LOBE DUE TO INFECTIOUS ORGANISM: Status: ACTIVE | Noted: 2020-12-07

## 2020-12-07 PROBLEM — K86.9 LESION OF PANCREAS: Status: ACTIVE | Noted: 2020-12-07

## 2020-12-07 PROBLEM — R53.1 WEAKNESS: Status: ACTIVE | Noted: 2020-12-07

## 2020-12-07 NOTE — PROGRESS NOTES
TELEPHONE VISIT    Consent:  He and/or health care decision maker is aware that that he may receive a bill for this telephone service, depending on his insurance coverage, and has provided verbal consent to proceed: Yes      Documentation:  I communicated with the patient and/or health care decision maker about pneumonia, lesion on pancreas, and weakness. Details of this discussion including any medical advice provided: this 70year old female presents for ED follow up for vertigo and weakness, was diagnosed with pneumonia, and small pleural effusion. Pt has lesion on pancreas. Pt is set up with pulmonology, neurology, and pancreas specialist. Pt needs wheelchair due to weakness, and decrease in strength. Pt instructed if any worse go ED ASAP. I affirm this is a Patient Initiated Episode with a Patient who has not had a related appointment within my department in the past 7 days or scheduled within the next 24 hours. Patient's location: {[place]:[id number]::\"home address in [place]\",\"other address in [facility]   Physician  location other address in [facility]   Other people involved in call Pts daughter -in-law.           Total Time: minutes: 21-30 minutes

## 2020-12-11 ENCOUNTER — HOSPITAL ENCOUNTER (OUTPATIENT)
Dept: CT IMAGING | Age: 75
Discharge: HOME | End: 2020-12-13
Payer: MEDICARE

## 2020-12-11 ENCOUNTER — HOSPITAL ENCOUNTER (OUTPATIENT)
Dept: MRI IMAGING | Age: 75
Discharge: HOME | End: 2020-12-13
Payer: MEDICARE

## 2020-12-11 PROCEDURE — 6360000004 HC RX CONTRAST MEDICATION: Performed by: RADIOLOGY

## 2020-12-11 PROCEDURE — 74183 MRI ABD W/O CNTR FLWD CNTR: CPT

## 2020-12-11 PROCEDURE — A9579 GAD-BASE MR CONTRAST NOS,1ML: HCPCS | Performed by: RADIOLOGY

## 2020-12-11 PROCEDURE — 70450 CT HEAD/BRAIN W/O DYE: CPT

## 2020-12-11 RX ADMIN — GADOTERIDOL 10 ML: 279.3 INJECTION, SOLUTION INTRAVENOUS at 20:55

## 2020-12-14 ENCOUNTER — OFFICE VISIT (OUTPATIENT)
Dept: FAMILY MEDICINE CLINIC | Age: 75
End: 2020-12-14
Payer: MEDICARE

## 2020-12-14 ENCOUNTER — HOSPITAL ENCOUNTER (OUTPATIENT)
Age: 75
Discharge: HOME | DRG: 391 | End: 2020-12-14
Payer: MEDICARE

## 2020-12-14 ENCOUNTER — APPOINTMENT (OUTPATIENT)
Dept: CT IMAGING | Age: 75
DRG: 391 | End: 2020-12-14
Payer: MEDICARE

## 2020-12-14 ENCOUNTER — HOSPITAL ENCOUNTER (INPATIENT)
Age: 75
LOS: 6 days | Discharge: HOME HEALTH | DRG: 391 | End: 2020-12-20
Attending: EMERGENCY MEDICINE | Admitting: INTERNAL MEDICINE
Payer: MEDICARE

## 2020-12-14 VITALS
OXYGEN SATURATION: 97 % | SYSTOLIC BLOOD PRESSURE: 112 MMHG | HEART RATE: 89 BPM | TEMPERATURE: 97.2 F | DIASTOLIC BLOOD PRESSURE: 68 MMHG

## 2020-12-14 DIAGNOSIS — R10.9 ABDOMINAL PAIN, UNSPECIFIED ABDOMINAL LOCATION: ICD-10-CM

## 2020-12-14 DIAGNOSIS — L02.211 ABDOMINAL WALL ABSCESS: ICD-10-CM

## 2020-12-14 DIAGNOSIS — K57.32 DIVERTICULITIS OF COLON: Primary | ICD-10-CM

## 2020-12-14 LAB
ALBUMIN SERPL-MCNC: 3 G/DL (ref 3.5–5.2)
ALP BLD-CCNC: 109 U/L (ref 35–104)
ALT SERPL-CCNC: 17 U/L (ref 0–32)
ANION GAP SERPL CALCULATED.3IONS-SCNC: 9 MMOL/L (ref 7–16)
AST SERPL-CCNC: 23 U/L (ref 0–31)
BASOPHILS ABSOLUTE: 0.02 E9/L (ref 0–0.2)
BASOPHILS RELATIVE PERCENT: 0.2 % (ref 0–2)
BILIRUB SERPL-MCNC: 0.3 MG/DL (ref 0–1.2)
BUN BLDV-MCNC: 11 MG/DL (ref 8–23)
CALCIUM SERPL-MCNC: 9.1 MG/DL (ref 8.6–10.2)
CHLORIDE BLD-SCNC: 99 MMOL/L (ref 98–107)
CO2: 28 MMOL/L (ref 22–29)
CREAT SERPL-MCNC: 0.6 MG/DL (ref 0.5–1)
EOSINOPHILS ABSOLUTE: 0.04 E9/L (ref 0.05–0.5)
EOSINOPHILS RELATIVE PERCENT: 0.4 % (ref 0–6)
GFR AFRICAN AMERICAN: >60
GFR NON-AFRICAN AMERICAN: >60 ML/MIN/1.73
GLUCOSE BLD-MCNC: 95 MG/DL (ref 74–99)
HCT VFR BLD CALC: 34.5 % (ref 34–48)
HEMOGLOBIN: 10.7 G/DL (ref 11.5–15.5)
IMMATURE GRANULOCYTES #: 0.11 E9/L
IMMATURE GRANULOCYTES %: 1.2 % (ref 0–5)
LACTIC ACID, SEPSIS: 0.8 MMOL/L (ref 0.5–1.9)
LYMPHOCYTES ABSOLUTE: 1.51 E9/L (ref 1.5–4)
LYMPHOCYTES RELATIVE PERCENT: 16.4 % (ref 20–42)
MCH RBC QN AUTO: 27.6 PG (ref 26–35)
MCHC RBC AUTO-ENTMCNC: 31 % (ref 32–34.5)
MCV RBC AUTO: 89.1 FL (ref 80–99.9)
MONOCYTES ABSOLUTE: 0.58 E9/L (ref 0.1–0.95)
MONOCYTES RELATIVE PERCENT: 6.3 % (ref 2–12)
NEUTROPHILS ABSOLUTE: 6.95 E9/L (ref 1.8–7.3)
NEUTROPHILS RELATIVE PERCENT: 75.5 % (ref 43–80)
PDW BLD-RTO: 14.5 FL (ref 11.5–15)
PLATELET # BLD: 799 E9/L (ref 130–450)
PMV BLD AUTO: 8.2 FL (ref 7–12)
POTASSIUM REFLEX MAGNESIUM: 3.9 MMOL/L (ref 3.5–5)
RBC # BLD: 3.87 E12/L (ref 3.5–5.5)
SODIUM BLD-SCNC: 136 MMOL/L (ref 132–146)
TOTAL PROTEIN: 6.7 G/DL (ref 6.4–8.3)
WBC # BLD: 9.2 E9/L (ref 4.5–11.5)

## 2020-12-14 PROCEDURE — 96365 THER/PROPH/DIAG IV INF INIT: CPT

## 2020-12-14 PROCEDURE — 2580000003 HC RX 258: Performed by: EMERGENCY MEDICINE

## 2020-12-14 PROCEDURE — 6360000002 HC RX W HCPCS: Performed by: EMERGENCY MEDICINE

## 2020-12-14 PROCEDURE — G8400 PT W/DXA NO RESULTS DOC: HCPCS | Performed by: FAMILY MEDICINE

## 2020-12-14 PROCEDURE — 6360000004 HC RX CONTRAST MEDICATION: Performed by: RADIOLOGY

## 2020-12-14 PROCEDURE — 3017F COLORECTAL CA SCREEN DOC REV: CPT | Performed by: FAMILY MEDICINE

## 2020-12-14 PROCEDURE — 80177 DRUG SCRN QUAN LEVETIRACETAM: CPT

## 2020-12-14 PROCEDURE — 85025 COMPLETE CBC W/AUTO DIFF WBC: CPT

## 2020-12-14 PROCEDURE — G8420 CALC BMI NORM PARAMETERS: HCPCS | Performed by: FAMILY MEDICINE

## 2020-12-14 PROCEDURE — 80175 DRUG SCREEN QUAN LAMOTRIGINE: CPT

## 2020-12-14 PROCEDURE — 1200000000 HC SEMI PRIVATE

## 2020-12-14 PROCEDURE — 99283 EMERGENCY DEPT VISIT LOW MDM: CPT

## 2020-12-14 PROCEDURE — 74177 CT ABD & PELVIS W/CONTRAST: CPT

## 2020-12-14 PROCEDURE — 4040F PNEUMOC VAC/ADMIN/RCVD: CPT | Performed by: FAMILY MEDICINE

## 2020-12-14 PROCEDURE — 80053 COMPREHEN METABOLIC PANEL: CPT

## 2020-12-14 PROCEDURE — G8484 FLU IMMUNIZE NO ADMIN: HCPCS | Performed by: FAMILY MEDICINE

## 2020-12-14 PROCEDURE — 2580000003 HC RX 258: Performed by: RADIOLOGY

## 2020-12-14 PROCEDURE — 99215 OFFICE O/P EST HI 40 MIN: CPT | Performed by: FAMILY MEDICINE

## 2020-12-14 PROCEDURE — 1036F TOBACCO NON-USER: CPT | Performed by: FAMILY MEDICINE

## 2020-12-14 PROCEDURE — 83605 ASSAY OF LACTIC ACID: CPT

## 2020-12-14 PROCEDURE — 1090F PRES/ABSN URINE INCON ASSESS: CPT | Performed by: FAMILY MEDICINE

## 2020-12-14 PROCEDURE — 1123F ACP DISCUSS/DSCN MKR DOCD: CPT | Performed by: FAMILY MEDICINE

## 2020-12-14 PROCEDURE — G8427 DOCREV CUR MEDS BY ELIG CLIN: HCPCS | Performed by: FAMILY MEDICINE

## 2020-12-14 RX ORDER — SODIUM CHLORIDE 0.9 % (FLUSH) 0.9 %
10 SYRINGE (ML) INJECTION ONCE
Status: COMPLETED | OUTPATIENT
Start: 2020-12-14 | End: 2020-12-14

## 2020-12-14 RX ADMIN — Medication 10 ML: at 20:33

## 2020-12-14 RX ADMIN — IOPAMIDOL 90 ML: 755 INJECTION, SOLUTION INTRAVENOUS at 20:33

## 2020-12-14 RX ADMIN — PIPERACILLIN SODIUM AND TAZOBACTAM SODIUM 4.5 G: 4; .5 INJECTION, POWDER, LYOPHILIZED, FOR SOLUTION INTRAVENOUS at 20:57

## 2020-12-14 ASSESSMENT — ENCOUNTER SYMPTOMS
VOMITING: 0
BACK PAIN: 0
ABDOMINAL PAIN: 1
EYE PAIN: 0
NAUSEA: 0
SHORTNESS OF BREATH: 0
SORE THROAT: 0

## 2020-12-14 NOTE — ED NOTES
FIRST PROVIDER CONTACT ASSESSMENT NOTE      Department of Emergency Medicine   Admit Date: No admission date for patient encounter. Chief Complaint: Abdominal Pain (Sent in for abdominal wound check and diverticulitis. )      History of Present Illness:    Esperanza Davies is a 70 y.o. female who presents to the ED for abdominal pain and a wound check for drainage from her umbilicus. She was sent by her PCP for a CT of the abdomen and pelvis after having an MRI requesting wound for suspicion of diverticulitis.   She is alert and oriented, respirations easy nonlabored with clear to yellow drainage from the umbilicus.        -----------------END OF FIRST PROVIDER CONTACT ASSESSMENT NOTE--------------  Electronically signed by MAYANK Ramos CNP   DD: 12/14/20               MAYANK Ramos CNP  12/14/20 1500

## 2020-12-14 NOTE — PROGRESS NOTES
[facility])  Family Medicine Outpatient        SUBJECTIVE:  CC: had concerns including Wound Check (one week ago-patient unable to sleep from abdominal pain. Developed a small hole above the bellybutton, oozing, cream colored liquird with odor. ). HPI:Cristiane Larry presented to the clinic for a routine visit. Romelia Ribeiro is a 70year old female presenting with her daughter-in-law today for concerns of a wound on her abdomen that started draining pus Friday night. Of note the patient has been recently treated outpatient for diverticulitis with Cefdinir and Flagyl and subsequently for a left lower pneumonia with Levaquin 11/28. Reportedly she had significant abdominal pain Wednesday and Thursday below her belly button with a buldging, that was subsequently relieved Friday night after a wound appeared on her abdomen that started draining a creme color. The wound has been managed with basic hygiene and gauze to date. She denies any abdominal pain at this time, f/c, changes in bowel habits or urination. She further denies any blood or mucus in her stool. She notes mild persistent diarrhea, but denies any foul smell. She had an Abdominal MRI 12/10.    12/11/20  MRI Abdomen WWO Contrast  Impression   Pancreatic cystic lesions noted within the head and uncinate process   measuring up to 0.8 cm.  A few these may connect to the main pancreatic duct. No pancreatic duct dilatation.  No enhancing internal component or other   finding concerning for soft tissue nodule or thickened septation.  Recommend   follow-up MRI in 2 years. Oval-shaped structure in the left upper abdomen which measures approximately   4.7 x 6.6 cm and appears to contain an air-fluid level or debris. This does   not definitively connect to bowel and is concerning for fluid collection.    Small amount of perisplenic fluid posteriorly which appears to demonstrate a   peripherally enhancing rim and measures up to 4.2 x 0.9 cm, also concerning   for collection. Cathy Craven patient's recent history of diverticulitis, findings   may be sequela of worsening infection.  Recommend further evaluation with CT   of the abdomen and pelvis with contrast.   Small umbilical hernia containing inflamed fat. Small bilateral pleural effusions and partial consolidation in the left lower   lobe, new.  Findings may be related to edema and atelectasis.  Infection   cannot be excluded. CT Head WO Contrast  Impression   Postsurgical changes.  Encephalomalacia on the right.  Atrophy and chronic   microvascular ischemic disease. 11/28/20  CXR  Impression   Left lower lobe infiltrate and small left pleural effusion         11/20/20  CT Abdomen Pelvis  Impression   1.  Inflammatory changes associated with sigmoid colon with multiple   diverticula.  Findings could suggest acute diverticulitis, colitis, or   neoplasm.  Short-term follow-up recommended. 2.  Large amount of stool throughout colon. 3.  Small free fluid located in pelvis.  No evidence of abscess or free air. 4.  Cystic lesion associated with pancreatic head could suggest pancreatic   pseudocyst or cystic pancreatic neoplasm.  MRI with and without contrast   could be helpful for further evaluation. Review of Systems   Constitutional: Negative for appetite change, fatigue and fever. Respiratory: Negative for cough, shortness of breath and wheezing. Cardiovascular: Negative for chest pain and palpitations. Gastrointestinal: Negative for abdominal pain, blood in stool, constipation, diarrhea, nausea and vomiting.         Purulent drainage from wound on abdomen       Outpatient Medications Marked as Taking for the 12/14/20 encounter (Office Visit) with Poli Cortes MD   Medication Sig Dispense Refill    lamoTRIgine (LAMICTAL) 100 MG tablet Take 1 tablet by mouth 2 times daily 60 tablet 5    levETIRAcetam (KEPPRA) 500 MG tablet TAKE 1 TABLET BY MOUTH TWO  TIMES DAILY 180 tablet 1    clopidogrel (PLAVIX) 75 MG tablet TAKE 1 TABLET BY MOUTH ONCE DAILY 90 tablet 1    atorvastatin (LIPITOR) 40 MG tablet Take 1 tablet by mouth nightly 90 tablet 0    folic acid (FOLVITE) 1 MG tablet Take 1 tablet by mouth daily 90 tablet 1    ibuprofen (IBUPROFEN 100 JESSICA STRENGTH) 100 MG chewable tablet Take 100 mg by mouth every 8 hours as needed for Pain      aspirin 81 MG chewable tablet Take 1 tablet by mouth daily 90 tablet 0       I have reviewed all pertinent PMHx, PSHx, FamHx, SocialHx, medications, and allergies and updated history as appropriate. OBJECTIVE    VS: /68   Pulse 89   Temp 97.2 °F (36.2 °C)   LMP  (LMP Unknown)   SpO2 97%   Breastfeeding No   Physical Exam  Constitutional:       General: She is not in acute distress. Appearance: She is well-developed. She is not diaphoretic. HENT:      Head: Normocephalic and atraumatic. Eyes:      Conjunctiva/sclera: Conjunctivae normal.      Pupils: Pupils are equal, round, and reactive to light. Neck:      Musculoskeletal: Normal range of motion and neck supple. Cardiovascular:      Rate and Rhythm: Normal rate and regular rhythm. Pulmonary:      Effort: Pulmonary effort is normal.      Breath sounds: Normal breath sounds. Abdominal:      General: Bowel sounds are normal. There is no distension. Palpations: Abdomen is soft. Tenderness: There is abdominal tenderness (LUQ with firm mass appreciated. ). There is no guarding or rebound. Hernia: No hernia is present. Comments: R Periumbilical wound 1am in size with purulent drainage expressed. Musculoskeletal: Normal range of motion. General: No swelling. Skin:     General: Skin is warm and dry. Neurological:      Mental Status: She is alert and oriented to person, place, and time. Comments: Ambulating with walker   Psychiatric:         Mood and Affect: Mood normal.         Behavior: Behavior normal.         ASSESSMENT/PLAN:  1.  LUQ abdominal pain    2. Left upper quadrant abdominal mass    3. Open wound of periumbilical region of abdominal wall with penetration into peritoneal cavity, initial encounter    4. Diverticulitis    5. Pancreatic lesion    6. Umbilical hernia without obstruction and without gangrene    7. Pneumonia of left lower lobe due to infectious organism    8. Pleural effusion    9. Weakness    10. At high risk for falls    Discussed concerns of underlying abscess with patient and daughter-in-law based on physical exam and recent MRI findings. Now with abdominal wound draining purulent fluid concern for fistula formation. Advised to go to ED for stat CT Abdomen Pelvis W Contrast, labs, and further acute work up. Report called to ED. I have reviewed my findings and recommendations with Latanya Munguia MD  12/15/2020 3:59 PM     Counseled regarding above diagnosis, including possible risks and complications, especially if left uncontrolled. Patient counseled on red flag symptoms and if they occur to go to the ED. Discussed medications risk/benefits and possible side effects and alternatives to treatment. Patient and/or guardian verbalizes understanding, agrees, feels comfortable with and wishes to proceed with above treatment plan. Advised patient regarding importance of keeping up with recommended health maintenance and to schedule as soon as possible if overdue, as this is important in assessing for undiagnosed pathology, especially cancer, as well as protecting against potentially harmful/life threatening disease. Patient and/or guardian verbalizes understanding and agrees with above counseling, assessment and plan. All questions answered. Please note this report has been partially produced using speech recognition software  and may contain errors related to that system including grammar, punctuation and spelling as well as words and phrases that may seem inappropriate.  If there are

## 2020-12-15 LAB
ALBUMIN SERPL-MCNC: 2.8 G/DL (ref 3.5–5.2)
ALP BLD-CCNC: 105 U/L (ref 35–104)
ALT SERPL-CCNC: 5 U/L (ref 0–32)
ANGLE (CLOT STRENGTH): 69.7 DEGREE (ref 59–74)
ANION GAP SERPL CALCULATED.3IONS-SCNC: 12 MMOL/L (ref 7–16)
APTT: 26.6 SEC (ref 24.5–35.1)
AST SERPL-CCNC: 18 U/L (ref 0–31)
BASOPHILS ABSOLUTE: 0.04 E9/L (ref 0–0.2)
BASOPHILS RELATIVE PERCENT: 0.4 % (ref 0–2)
BILIRUB SERPL-MCNC: 0.3 MG/DL (ref 0–1.2)
BILIRUBIN DIRECT: <0.2 MG/DL (ref 0–0.3)
BILIRUBIN, INDIRECT: ABNORMAL MG/DL (ref 0–1)
BUN BLDV-MCNC: 10 MG/DL (ref 8–23)
CALCIUM SERPL-MCNC: 9.2 MG/DL (ref 8.6–10.2)
CEA: 2.2 NG/ML (ref 0–5.2)
CHLORIDE BLD-SCNC: 97 MMOL/L (ref 98–107)
CO2: 29 MMOL/L (ref 22–29)
CREAT SERPL-MCNC: 0.8 MG/DL (ref 0.5–1)
EKG ATRIAL RATE: 80 BPM
EKG P AXIS: 40 DEGREES
EKG P-R INTERVAL: 156 MS
EKG Q-T INTERVAL: 396 MS
EKG QRS DURATION: 90 MS
EKG QTC CALCULATION (BAZETT): 456 MS
EKG R AXIS: 30 DEGREES
EKG T AXIS: 69 DEGREES
EKG VENTRICULAR RATE: 80 BPM
EOSINOPHILS ABSOLUTE: 0.08 E9/L (ref 0.05–0.5)
EOSINOPHILS RELATIVE PERCENT: 0.7 % (ref 0–6)
EPL-TEG: 0.1 % (ref 0–15)
FERRITIN: 247 NG/ML
G-TEG: 25.9 K D/SC (ref 4.5–11)
GFR AFRICAN AMERICAN: >60
GFR NON-AFRICAN AMERICAN: >60 ML/MIN/1.73
GLUCOSE BLD-MCNC: 52 MG/DL (ref 74–99)
HCT VFR BLD CALC: 29.5 % (ref 34–48)
HCT VFR BLD CALC: 32.4 % (ref 34–48)
HEMOGLOBIN: 10.1 G/DL (ref 11.5–15.5)
HEMOGLOBIN: 9.7 G/DL (ref 11.5–15.5)
IMMATURE GRANULOCYTES #: 0.1 E9/L
IMMATURE GRANULOCYTES %: 0.9 % (ref 0–5)
INR BLD: 1.2
IRON SATURATION: 16 % (ref 15–50)
IRON: 20 MCG/DL (ref 37–145)
K (CLOTTING TIME): 0.8 MIN (ref 1–3)
LY30 (FIBRINOLYSIS): 0.1 % (ref 0–8)
LYMPHOCYTES ABSOLUTE: 0.96 E9/L (ref 1.5–4)
LYMPHOCYTES RELATIVE PERCENT: 8.9 % (ref 20–42)
MA (MAX AMPLITUDE): 83.8 MM (ref 50–70)
MCH RBC QN AUTO: 27.8 PG (ref 26–35)
MCH RBC QN AUTO: 28.6 PG (ref 26–35)
MCHC RBC AUTO-ENTMCNC: 31.2 % (ref 32–34.5)
MCHC RBC AUTO-ENTMCNC: 32.9 % (ref 32–34.5)
MCV RBC AUTO: 87 FL (ref 80–99.9)
MCV RBC AUTO: 89.3 FL (ref 80–99.9)
MONOCYTES ABSOLUTE: 0.53 E9/L (ref 0.1–0.95)
MONOCYTES RELATIVE PERCENT: 4.9 % (ref 2–12)
NEUTROPHILS ABSOLUTE: 9.03 E9/L (ref 1.8–7.3)
NEUTROPHILS RELATIVE PERCENT: 84.2 % (ref 43–80)
PDW BLD-RTO: 14.3 FL (ref 11.5–15)
PDW BLD-RTO: 14.5 FL (ref 11.5–15)
PLATELET # BLD: 743 E9/L (ref 130–450)
PLATELET # BLD: 777 E9/L (ref 130–450)
PMV BLD AUTO: 8.3 FL (ref 7–12)
PMV BLD AUTO: 8.3 FL (ref 7–12)
POTASSIUM SERPL-SCNC: 4.3 MMOL/L (ref 3.5–5)
PROTHROMBIN TIME: 13.1 SEC (ref 9.3–12.4)
R (REACTION TIME): 4.6 MIN (ref 5–10)
RBC # BLD: 3.39 E12/L (ref 3.5–5.5)
RBC # BLD: 3.63 E12/L (ref 3.5–5.5)
SODIUM BLD-SCNC: 138 MMOL/L (ref 132–146)
T3 TOTAL: 90.37 NG/DL (ref 80–200)
TOTAL IRON BINDING CAPACITY: 129 MCG/DL (ref 250–450)
TOTAL PROTEIN: 6.6 G/DL (ref 6.4–8.3)
WBC # BLD: 10.7 E9/L (ref 4.5–11.5)
WBC # BLD: 10.9 E9/L (ref 4.5–11.5)

## 2020-12-15 PROCEDURE — 85027 COMPLETE CBC AUTOMATED: CPT

## 2020-12-15 PROCEDURE — 6360000002 HC RX W HCPCS: Performed by: EMERGENCY MEDICINE

## 2020-12-15 PROCEDURE — 6370000000 HC RX 637 (ALT 250 FOR IP): Performed by: INTERNAL MEDICINE

## 2020-12-15 PROCEDURE — 83540 ASSAY OF IRON: CPT

## 2020-12-15 PROCEDURE — 85025 COMPLETE CBC W/AUTO DIFF WBC: CPT

## 2020-12-15 PROCEDURE — 80076 HEPATIC FUNCTION PANEL: CPT

## 2020-12-15 PROCEDURE — 83550 IRON BINDING TEST: CPT

## 2020-12-15 PROCEDURE — 2580000003 HC RX 258: Performed by: STUDENT IN AN ORGANIZED HEALTH CARE EDUCATION/TRAINING PROGRAM

## 2020-12-15 PROCEDURE — 87040 BLOOD CULTURE FOR BACTERIA: CPT

## 2020-12-15 PROCEDURE — 93010 ELECTROCARDIOGRAM REPORT: CPT | Performed by: INTERNAL MEDICINE

## 2020-12-15 PROCEDURE — 84480 ASSAY TRIIODOTHYRONINE (T3): CPT

## 2020-12-15 PROCEDURE — 85384 FIBRINOGEN ACTIVITY: CPT

## 2020-12-15 PROCEDURE — 36415 COLL VENOUS BLD VENIPUNCTURE: CPT

## 2020-12-15 PROCEDURE — 1200000000 HC SEMI PRIVATE

## 2020-12-15 PROCEDURE — 86301 IMMUNOASSAY TUMOR CA 19-9: CPT

## 2020-12-15 PROCEDURE — 2580000003 HC RX 258: Performed by: EMERGENCY MEDICINE

## 2020-12-15 PROCEDURE — 85347 COAGULATION TIME ACTIVATED: CPT

## 2020-12-15 PROCEDURE — 2580000003 HC RX 258: Performed by: INTERNAL MEDICINE

## 2020-12-15 PROCEDURE — 6370000000 HC RX 637 (ALT 250 FOR IP): Performed by: SPECIALIST

## 2020-12-15 PROCEDURE — 85730 THROMBOPLASTIN TIME PARTIAL: CPT

## 2020-12-15 PROCEDURE — 93005 ELECTROCARDIOGRAM TRACING: CPT | Performed by: INTERNAL MEDICINE

## 2020-12-15 PROCEDURE — 85610 PROTHROMBIN TIME: CPT

## 2020-12-15 PROCEDURE — 82378 CARCINOEMBRYONIC ANTIGEN: CPT

## 2020-12-15 PROCEDURE — 85576 BLOOD PLATELET AGGREGATION: CPT

## 2020-12-15 PROCEDURE — 99222 1ST HOSP IP/OBS MODERATE 55: CPT | Performed by: INTERNAL MEDICINE

## 2020-12-15 PROCEDURE — 80048 BASIC METABOLIC PNL TOTAL CA: CPT

## 2020-12-15 PROCEDURE — 82728 ASSAY OF FERRITIN: CPT

## 2020-12-15 RX ORDER — L. ACIDOPHILUS/L.BULGARICUS 1MM CELL
4 TABLET ORAL 3 TIMES DAILY
Status: DISCONTINUED | OUTPATIENT
Start: 2020-12-15 | End: 2020-12-15 | Stop reason: CLARIF

## 2020-12-15 RX ORDER — ACETAMINOPHEN 650 MG/1
650 SUPPOSITORY RECTAL EVERY 6 HOURS PRN
Status: DISCONTINUED | OUTPATIENT
Start: 2020-12-15 | End: 2020-12-20 | Stop reason: HOSPADM

## 2020-12-15 RX ORDER — FLUCONAZOLE 100 MG/1
400 TABLET ORAL DAILY
Status: DISCONTINUED | OUTPATIENT
Start: 2020-12-15 | End: 2020-12-20 | Stop reason: HOSPADM

## 2020-12-15 RX ORDER — CLOPIDOGREL BISULFATE 75 MG/1
75 TABLET ORAL DAILY
Status: DISCONTINUED | OUTPATIENT
Start: 2020-12-15 | End: 2020-12-19

## 2020-12-15 RX ORDER — DEXTROSE MONOHYDRATE 25 G/50ML
25 INJECTION, SOLUTION INTRAVENOUS ONCE
Status: COMPLETED | OUTPATIENT
Start: 2020-12-15 | End: 2020-12-15

## 2020-12-15 RX ORDER — LAMOTRIGINE 100 MG/1
100 TABLET ORAL 2 TIMES DAILY
Status: DISCONTINUED | OUTPATIENT
Start: 2020-12-15 | End: 2020-12-20 | Stop reason: HOSPADM

## 2020-12-15 RX ORDER — FOLIC ACID 1 MG/1
1 TABLET ORAL DAILY
Status: DISCONTINUED | OUTPATIENT
Start: 2020-12-15 | End: 2020-12-20 | Stop reason: HOSPADM

## 2020-12-15 RX ORDER — PROMETHAZINE HYDROCHLORIDE 25 MG/1
12.5 TABLET ORAL EVERY 6 HOURS PRN
Status: DISCONTINUED | OUTPATIENT
Start: 2020-12-15 | End: 2020-12-20 | Stop reason: HOSPADM

## 2020-12-15 RX ORDER — SODIUM CHLORIDE 0.9 % (FLUSH) 0.9 %
10 SYRINGE (ML) INJECTION PRN
Status: DISCONTINUED | OUTPATIENT
Start: 2020-12-15 | End: 2020-12-20 | Stop reason: HOSPADM

## 2020-12-15 RX ORDER — ACETAMINOPHEN 325 MG/1
650 TABLET ORAL EVERY 6 HOURS PRN
Status: DISCONTINUED | OUTPATIENT
Start: 2020-12-15 | End: 2020-12-20 | Stop reason: HOSPADM

## 2020-12-15 RX ORDER — ATORVASTATIN CALCIUM 40 MG/1
40 TABLET, FILM COATED ORAL NIGHTLY
Status: DISCONTINUED | OUTPATIENT
Start: 2020-12-15 | End: 2020-12-20 | Stop reason: HOSPADM

## 2020-12-15 RX ORDER — LEVETIRACETAM 500 MG/1
500 TABLET ORAL 2 TIMES DAILY
Status: DISCONTINUED | OUTPATIENT
Start: 2020-12-15 | End: 2020-12-20 | Stop reason: HOSPADM

## 2020-12-15 RX ORDER — ASPIRIN 81 MG/1
81 TABLET, CHEWABLE ORAL DAILY
Status: DISCONTINUED | OUTPATIENT
Start: 2020-12-15 | End: 2020-12-19

## 2020-12-15 RX ORDER — ONDANSETRON 2 MG/ML
4 INJECTION INTRAMUSCULAR; INTRAVENOUS EVERY 6 HOURS PRN
Status: DISCONTINUED | OUTPATIENT
Start: 2020-12-15 | End: 2020-12-20 | Stop reason: HOSPADM

## 2020-12-15 RX ORDER — POLYETHYLENE GLYCOL 3350 17 G/17G
17 POWDER, FOR SOLUTION ORAL DAILY PRN
Status: DISCONTINUED | OUTPATIENT
Start: 2020-12-15 | End: 2020-12-20 | Stop reason: HOSPADM

## 2020-12-15 RX ORDER — LACTOBACILLUS RHAMNOSUS GG 10B CELL
1 CAPSULE ORAL DAILY
Status: DISCONTINUED | OUTPATIENT
Start: 2020-12-15 | End: 2020-12-20 | Stop reason: HOSPADM

## 2020-12-15 RX ORDER — DEXTROSE AND SODIUM CHLORIDE 5; .45 G/100ML; G/100ML
INJECTION, SOLUTION INTRAVENOUS CONTINUOUS
Status: DISCONTINUED | OUTPATIENT
Start: 2020-12-15 | End: 2020-12-15

## 2020-12-15 RX ORDER — SODIUM CHLORIDE 0.9 % (FLUSH) 0.9 %
10 SYRINGE (ML) INJECTION EVERY 12 HOURS SCHEDULED
Status: DISCONTINUED | OUTPATIENT
Start: 2020-12-15 | End: 2020-12-20 | Stop reason: HOSPADM

## 2020-12-15 RX ORDER — LIDOCAINE HYDROCHLORIDE 10 MG/ML
5 INJECTION, SOLUTION EPIDURAL; INFILTRATION; INTRACAUDAL; PERINEURAL ONCE
Status: DISCONTINUED | OUTPATIENT
Start: 2020-12-15 | End: 2020-12-20 | Stop reason: HOSPADM

## 2020-12-15 RX ADMIN — SODIUM CHLORIDE 25 ML: 9 INJECTION, SOLUTION INTRAVENOUS at 13:08

## 2020-12-15 RX ADMIN — Medication 1 CAPSULE: at 14:55

## 2020-12-15 RX ADMIN — Medication 10 ML: at 08:34

## 2020-12-15 RX ADMIN — FLUCONAZOLE 400 MG: 100 TABLET ORAL at 14:55

## 2020-12-15 RX ADMIN — LEVETIRACETAM 500 MG: 500 TABLET ORAL at 08:33

## 2020-12-15 RX ADMIN — DEXTROSE MONOHYDRATE 25 G: 25 INJECTION, SOLUTION INTRAVENOUS at 08:25

## 2020-12-15 RX ADMIN — FOLIC ACID 1 MG: 1 TABLET ORAL at 08:33

## 2020-12-15 RX ADMIN — LEVETIRACETAM 500 MG: 500 TABLET ORAL at 20:34

## 2020-12-15 RX ADMIN — PIPERACILLIN AND TAZOBACTAM 3.38 G: 3; .375 INJECTION, POWDER, LYOPHILIZED, FOR SOLUTION INTRAVENOUS at 08:34

## 2020-12-15 RX ADMIN — ATORVASTATIN CALCIUM 40 MG: 40 TABLET, FILM COATED ORAL at 20:34

## 2020-12-15 RX ADMIN — LAMOTRIGINE 100 MG: 100 TABLET ORAL at 20:34

## 2020-12-15 RX ADMIN — SODIUM CHLORIDE 25 ML: 9 INJECTION, SOLUTION INTRAVENOUS at 18:57

## 2020-12-15 RX ADMIN — DEXTROSE AND SODIUM CHLORIDE: 5; 450 INJECTION, SOLUTION INTRAVENOUS at 08:27

## 2020-12-15 RX ADMIN — PIPERACILLIN AND TAZOBACTAM 3.38 G: 3; .375 INJECTION, POWDER, LYOPHILIZED, FOR SOLUTION INTRAVENOUS at 14:55

## 2020-12-15 RX ADMIN — LAMOTRIGINE 100 MG: 100 TABLET ORAL at 08:33

## 2020-12-15 ASSESSMENT — ENCOUNTER SYMPTOMS
ABDOMINAL PAIN: 1
ABDOMINAL PAIN: 0
COUGH: 0
VOICE CHANGE: 0
TROUBLE SWALLOWING: 0
DIARRHEA: 0
VOMITING: 0
EYE PAIN: 0
NAUSEA: 0
WHEEZING: 0
SHORTNESS OF BREATH: 0
BACK PAIN: 0
COUGH: 0
COLOR CHANGE: 0
CONSTIPATION: 0
SORE THROAT: 0
SHORTNESS OF BREATH: 0
BLOOD IN STOOL: 0
DIARRHEA: 1
BLOOD IN STOOL: 0

## 2020-12-15 NOTE — CARE COORDINATION
Met with the pt at the bedside to discuss transition of care. The pt lives with her son and D-I-L. She uses a walker when needed, but is usually independent. Her PCP is Saravanan Weaver. Pharmacy is [facility]. She will return home when medically stable. Her family will provide transportation. Will follow.  Saravanan Weiss RN

## 2020-12-15 NOTE — PROGRESS NOTES
Called floor and spoke to patient's nurse, Jyothi Tafoya RN, in regards to the patient taking plavix and aspirin on 12/14/2020. Informed him that patient's case was presented to Dr Wallace Dickerson II and that he would either like the plavix reversed or held for 5 days before proceeding with the procedure due to increased risk of bleeding. I asked Lamberto Reynolds, to let us know what the patient's physicians decide to do. Will follow up.

## 2020-12-15 NOTE — ED NOTES
Pt alert oriented skin warm dry resp easy lungs cta abd soft non tender bowel sounds present pt has small wound to right lower quad small amt of serous drainage noted pt denies pain at this time     Husam Wolf RN  12/15/20 [id number]

## 2020-12-15 NOTE — CONSULTS
GENERAL SURGERY  CONSULT NOTE  12/15/2020    Physician Consulted: Dr. Sandra Tripathi  Reason for Consult: Diverticulitis w/abscess  Referring Physician: Dr. Kaylee Hastings is a 70 y.o. female with hx of stroke with no residual deficits who presents for evaluation of abdominal pain. She states that on 11/20 she was diagnosed with diverticulitis and pneumonia. She was treated non-operatively with Cefdinir, Flagyl, and Levaquin. During her treatment, she states the pain in her lower abdomen continued to get worse until about 3-4 days ago. At this time, she went to get a repeat CT scan and immediately after the CT she felt a \"pop\" around her umbilicus with purulent drainage which provided relief. She was brought in by her children today due to the ongoing drainage. Currently, she does not have any abdominal pain or active drainage from her umbilicus. She denies any nausea, vomiting, melena, hematochezia, fever, or chills. She has no family history of Crohns, UC, or colon CA. She has had a brain meningioma removal and vaginal hysterectomy in the past, but denies having any abdominal surgeries. She is currently on ASA/Plavix daily due to her previous stroke. She has been hemodynamically stable, afebrile, and does not have a leukocytosis. Past Medical History:   Diagnosis Date    Cerebral artery occlusion with cerebral infarction ([facility]     Hemiparesis affecting left side as late effect of stroke ([facility] 12/4/2020    Hyperlipidemia     Localization-related epilepsy ([facility] 6/25/2019       Past Surgical History:   Procedure Laterality Date    BRAIN MENINGIOMA EXCISION      HYSTERECTOMY      HYSTERECTOMY, VAGINAL  2014    Total       Medications Prior to Admission:    Prior to Admission medications    Medication Sig Start Date End Date Taking?  Authorizing Provider   lamoTRIgine (LAMICTAL) 100 MG tablet Take 1 tablet by mouth 2 times daily 12/4/20   Darien Kang MD   levETIRAcetam (KEPPRA) 500 MG tablet TAKE 1 TABLET BY MOUTH TWO  TIMES DAILY 10/23/20   Chino Blevins DO   clopidogrel (PLAVIX) 75 MG tablet TAKE 1 TABLET BY MOUTH ONCE DAILY 10/23/20   Chino Blevins DO   atorvastatin (LIPITOR) 40 MG tablet Take 1 tablet by mouth nightly 10/23/20   Chino Blevins DO   folic acid (FOLVITE) 1 MG tablet Take 1 tablet by mouth daily 10/23/20   Chino Vail DO   ibuprofen (IBUPROFEN 100 JESSICA STRENGTH) 100 MG chewable tablet Take 100 mg by mouth every 8 hours as needed for Pain    Historical Provider, MD   aspirin 81 MG chewable tablet Take 1 tablet by mouth daily 7/2/19   Gela Aguirre MD       No Known Allergies    History reviewed. No pertinent family history. Social History     Tobacco Use    Smoking status: Passive Smoke Exposure - Never Smoker    Smokeless tobacco: Never Used    Tobacco comment: Pt's  was heavy smoker   Substance Use Topics    Alcohol use: Never     Frequency: Never    Drug use: Never         Review of Systems   General ROS: negative  Hematological and Lymphatic ROS: negative  Respiratory ROS: negative  Cardiovascular ROS: negative  Gastrointestinal ROS: As above  Genito-Urinary ROS: negative  Musculoskeletal ROS: negative      PHYSICAL EXAM:    Vitals:    12/15/20 0143   BP: 128/79   Pulse: 97   Resp: 17   Temp: 97.4 °F (36.3 °C)   SpO2: 96%       General Appearance:  awake, alert, oriented, in no acute distress  Skin:  Skin color, texture, turgor normal. No rashes or lesions. Head/face:  NCAT  Eyes:  No gross abnormalities. Lungs:  No increased work of breathing on room air  Heart:  RR and normotensive  Abdomen:  Soft, non-tender, non-distended. Umbilicus with old dried yellow-green purulent fluid. Extremities: Extremities warm to touch, pink, with no edema.     LABS:    CBC  Recent Labs     12/14/20  1503   WBC 9.2   HGB 10.7*   HCT 34.5   *     BMP  Recent Labs     12/14/20  1503      K 3.9   CL 99   CO2 28   BUN 11 CREATININE 0.6   CALCIUM 9.1     Liver Function  Recent Labs     12/14/20  1503   BILITOT 0.3   AST 23   ALT 17   ALKPHOS 109*   PROT 6.7   LABALBU 3.0*     No results for input(s): LACTATE in the last 72 hours. No results for input(s): INR, PTT in the last 72 hours. Invalid input(s): PT    RADIOLOGY    Ct Abdomen Pelvis W Iv Contrast Additional Contrast? None    Result Date: 12/14/2020  EXAMINATION: CT OF THE ABDOMEN AND PELVIS WITH CONTRAST 12/14/2020 8:29 pm TECHNIQUE: CT of the abdomen and pelvis was performed with the administration of intravenous contrast. Multiplanar reformatted images are provided for review. Dose modulation, iterative reconstruction, and/or weight based adjustment of the mA/kV was utilized to reduce the radiation dose to as low as reasonably achievable. COMPARISON: None. HISTORY: ORDERING SYSTEM PROVIDED HISTORY: r/o diverticulitis and periumbilical abscess TECHNOLOGIST PROVIDED HISTORY: Additional Contrast?->None Reason for exam:->r/o diverticulitis and periumbilical abscess What reading provider will be dictating this exam?->CRC FINDINGS: Lower Chest: Bibasilar pleuroparenchymal scarring. Focal consolidation left posterior basal segment. Small left pleural effusion. Organs: Liver appears unremarkable. Gallbladder is present. Small splenic subcapsular/perisplenic collection. Adrenal glands appear unremarkable. There is symmetric enhancement of the kidneys with no hydronephrosis. Small cortical cyst in the right kidney. Stable tiny cystic lesion in the pancreatic head. GI/Bowel: Thickening of the rectosigmoid colon with infiltration of the surrounding fat. There is also infiltration of the mesenteric fat in the mid abdomen. Small collection with internal pocket of gas in the anterior abdominal wall communicates with the skin at the level of the iliac crest, most compatible with fistula formation. Pelvis: Thicken rectosigmoid colon, described above.  Peritoneum/Retroperitoneum: 6.1 x 4.4 cm cystic mass with air-fluid level and thick dense rim just deep to the left anterior abdominal wall; these collections do not appear to communicate with adjacent loops of bowel, and most likely represent abscess. It is abutting against the left anterior rectus abdominal muscle, which is thickened. Bones/Soft Tissues: Please see above. Multiloculated fluid collections with internal air-fluid levels in the left anterior abdomen (most likely representing abscess), abutting against adjacent loops of bowel and anterior abdominal wall, which is thickened and appears inflamed. In addition there is also communication with the anterior abdominal wall in the midline at the level of the iliac crest, compatible with fistula formation. Thickened sigmoid colon with surrounding inflammatory changes, may represent progressing sigmoid diverticulitis, or other inflammatory/ischemic process. New pleuroparenchymal changes with focal consolidation and fluid collection at the left lung base/lower thorax. Small perisplenic/subcapsular splenic collection.       ASSESSMENT:  70 y.o. 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 Bhakti Bullard    Electronically signed by Rita Monroe MD on 12/15/20 at 3:07 AM EST

## 2020-12-15 NOTE — HOME CARE
Patient current with [facility] for PT. Will need LINDSEY orders if appropriate at discharge. Zachariah Clemente LPN  [facility].

## 2020-12-15 NOTE — H&P
formation, in addition to severe diverticulitis. She was started on Zosyn, with a general surgery consultation, and to be admitted to general floor. Past Medical History:      Diagnosis Date    Cerebral artery occlusion with cerebral infarction ([facility]     Hemiparesis affecting left side as late effect of stroke ([facility] [address] 12/4/2020    Hyperlipidemia     Localization-related epilepsy ([facility] [address] 6/25/2019       Past Surgical History:        Procedure Laterality Date    BRAIN MENINGIOMA EXCISION      HYSTERECTOMY      HYSTERECTOMY, VAGINAL  2014    Total       Medications Prior to Admission:    Prior to Admission medications    Medication Sig Start Date End Date Taking? Authorizing Provider   lamoTRIgine (LAMICTAL) 100 MG tablet Take 1 tablet by mouth 2 times daily 12/4/20   Pretty Ordaz MD   levETIRAcetam (KEPPRA) 500 MG tablet TAKE 1 TABLET BY MOUTH TWO  TIMES DAILY 10/23/20   Chino Dubose, DO   clopidogrel (PLAVIX) 75 MG tablet TAKE 1 TABLET BY MOUTH ONCE DAILY 10/23/20   Chino Dubose, DO   atorvastatin (LIPITOR) 40 MG tablet Take 1 tablet by mouth nightly 10/23/20   Chino Dubose, DO   folic acid (FOLVITE) 1 MG tablet Take 1 tablet by mouth daily 10/23/20   Chino Vail, DO   ibuprofen (IBUPROFEN 100 JESSICA STRENGTH) 100 MG chewable tablet Take 100 mg by mouth every 8 hours as needed for Pain    Historical Provider, MD   aspirin 81 MG chewable tablet Take 1 tablet by mouth daily 7/2/19   Isis Gottlieb MD       Allergies:  Patient has no known allergies. Social History:   TOBACCO:   reports that she is a non-smoker but has been exposed to tobacco smoke. She has never used smokeless tobacco.  ETOH:   reports no history of alcohol use. Family History:   History reviewed. No pertinent family history.     REVIEW OF SYSTEMS:    · Constitutional: No fever, no chills, no change in weight; good appetite  · HEENT: No blurred vision, no ear problems, no sore throat, no no clubbing  · Musculoskeletal: normal range of motion, no joint swelling, deformity or tenderness  · Neurologic: speech normal, sensation to light touch intact, vibratory sensation normal and muscle strength normal   Labs and Imaging Studies   Basic Labs  Recent Labs     12/14/20  1503      K 3.9   CL 99   CO2 28   BUN 11   CREATININE 0.6   GLUCOSE 95   CALCIUM 9.1       Recent Labs     12/14/20  1503   WBC 9.2   RBC 3.87   HGB 10.7*   HCT 34.5   MCV 89.1   MCH 27.6   MCHC 31.0*   RDW 14.5   *   MPV 8.2       CBC:   Lab Results   Component Value Date    WBC 9.2 12/14/2020    RBC 3.87 12/14/2020    HGB 10.7 12/14/2020    HCT 34.5 12/14/2020    MCV 89.1 12/14/2020    RDW 14.5 12/14/2020     12/14/2020     CMP:  Lab Results   Component Value Date     12/14/2020    K 3.9 12/14/2020    CL 99 12/14/2020    CO2 28 12/14/2020    BUN 11 12/14/2020    PROT 6.7 12/14/2020       Imaging Studies:     Ct Head Wo Contrast    Result Date: 12/11/2020  EXAMINATION: CT OF THE HEAD WITHOUT CONTRAST  12/11/2020 4:55 pm TECHNIQUE: CT of the head was performed without the administration of intravenous contrast. Dose modulation, iterative reconstruction, and/or weight based adjustment of the mA/kV was utilized to reduce the radiation dose to as low as reasonably achievable. COMPARISON: None. HISTORY: ORDERING SYSTEM PROVIDED HISTORY: Vertigo TECHNOLOGIST PROVIDED HISTORY: Reason for exam:->vertigo   weakness FINDINGS: There is right frontal encephalomalacia as well as atrophy and chronic microvascular ischemic disease. There are postsurgical changes at the right calvarium. There is no intracranial mass, hemorrhage or midline shift. Postsurgical changes. Encephalomalacia on the right. Atrophy and chronic microvascular ischemic disease.     Mri Abdomen W Wo Contrast    Result Date: 12/13/2020  EXAMINATION: MRI OF THE ABDOMEN WITHOUT AND WITH CONTRAST, 12/11/2020 7:42 pm TECHNIQUE: Multiplanar multisequence MRI of the abdomen was performed without and with the administration of intravenous contrast. COMPARISON: None. HISTORY: ORDERING SYSTEM PROVIDED HISTORY: Lesion of pancreas TECHNOLOGIST PROVIDED HISTORY: Reason for exam:->pancreas lesion Reason for exam:->diverticulitis Specify organ?->Liver Specify organ?->Ascending Colon Specify organ? ->Descending Colon Specify organ?->Tranverse Colon Specify organ?->Small Intestine FINDINGS: Small bilateral pleural effusions, slightly greater on the left. A few cystic lesions in the uncinate process and pancreatic head which measure up to 8 mm. A few of these may connect to the main pancreatic duct. No pancreatic duct dilatation. No appreciable enhancement or discrete nodular component. Layering sludge versus tiny stones in the gallbladder. Gallbladder otherwise is unremarkable. No biliary dilatation. Liver and spleen are unremarkable. Umbilical hernia containing inflamed fat. Evaluation is somewhat limited due to small size and motion. This may also contain bowel. Oval-shaped structure in the left upper abdomen which measures approximately 4.7 x 6.6 cm and appears to contain an air-fluid level or debris. This does not definitively connect to bowel and may represent a fluid collection (series 10, image 27). Small amount of perisplenic fluid posteriorly which appears to demonstrate a peripherally enhancing rim and measures up to 4.2 x 0.9 cm. No abdominal aortic aneurysm. Adrenal glands are unremarkable. Subcentimeter cyst in the right kidney. No hydronephrosis. Pancreatic cystic lesions noted within the head and uncinate process measuring up to 0.8 cm. A few these may connect to the main pancreatic duct. No pancreatic duct dilatation. No enhancing internal component or other finding concerning for soft tissue nodule or thickened septation. Recommend follow-up MRI in 2 years.  Oval-shaped structure in the left upper abdomen which measures approximately 4.7 x 6.6 cm and appears to contain an air-fluid level or debris. This does not definitively connect to bowel and is concerning for fluid collection. Small amount of perisplenic fluid posteriorly which appears to demonstrate a peripherally enhancing rim and measures up to 4.2 x 0.9 cm, also concerning for collection. Given patient's recent history of diverticulitis, findings may be sequela of worsening infection. Recommend further evaluation with CT of the abdomen and pelvis with contrast. Small umbilical hernia containing inflamed fat. Small bilateral pleural effusions and partial consolidation in the left lower lobe, new. Findings may be related to edema and atelectasis. Infection cannot be excluded. Ct Abdomen Pelvis W Iv Contrast Additional Contrast? None    Result Date: 12/14/2020  EXAMINATION: CT OF THE ABDOMEN AND PELVIS WITH CONTRAST 12/14/2020 8:29 pm TECHNIQUE: CT of the abdomen and pelvis was performed with the administration of intravenous contrast. Multiplanar reformatted images are provided for review. Dose modulation, iterative reconstruction, and/or weight based adjustment of the mA/kV was utilized to reduce the radiation dose to as low as reasonably achievable. COMPARISON: None. HISTORY: ORDERING SYSTEM PROVIDED HISTORY: r/o diverticulitis and periumbilical abscess TECHNOLOGIST PROVIDED HISTORY: Additional Contrast?->None Reason for exam:->r/o diverticulitis and periumbilical abscess What reading provider will be dictating this exam?->CRC FINDINGS: Lower Chest: Bibasilar pleuroparenchymal scarring. Focal consolidation left posterior basal segment. Small left pleural effusion. Organs: Liver appears unremarkable. Gallbladder is present. Small splenic subcapsular/perisplenic collection. Adrenal glands appear unremarkable. There is symmetric enhancement of the kidneys with no hydronephrosis. Small cortical cyst in the right kidney. Stable tiny cystic lesion in the pancreatic head.  GI/Bowel: Thickening of the rectosigmoid colon with infiltration of the surrounding fat. There is also infiltration of the mesenteric fat in the mid abdomen. Small collection with internal pocket of gas in the anterior abdominal wall communicates with the skin at the level of the iliac crest, most compatible with fistula formation. Pelvis: Thicken rectosigmoid colon, described above. Peritoneum/Retroperitoneum: 6.1 x 4.4 cm cystic mass with air-fluid level and thick dense rim just deep to the left anterior abdominal wall; these collections do not appear to communicate with adjacent loops of bowel, and most likely represent abscess. It is abutting against the left anterior rectus abdominal muscle, which is thickened. Bones/Soft Tissues: Please see above. Multiloculated fluid collections with internal air-fluid levels in the left anterior abdomen (most likely representing abscess), abutting against adjacent loops of bowel and anterior abdominal wall, which is thickened and appears inflamed. In addition there is also communication with the anterior abdominal wall in the midline at the level of the iliac crest, compatible with fistula formation. Thickened sigmoid colon with surrounding inflammatory changes, may represent progressing sigmoid diverticulitis, or other inflammatory/ischemic process. New pleuroparenchymal changes with focal consolidation and fluid collection at the left lung base/lower thorax. Small perisplenic/subcapsular splenic collection. Ct Abdomen Pelvis W Iv Contrast Additional Contrast? None    Result Date: 11/20/2020  EXAMINATION: CT OF THE ABDOMEN AND PELVIS WITH CONTRAST 11/20/2020 7:27 pm TECHNIQUE: CT of the abdomen and pelvis was performed with the administration of intravenous contrast. Multiplanar reformatted images are provided for review.  Dose modulation, iterative reconstruction, and/or weight based adjustment of the mA/kV was utilized to reduce the radiation dose to as low as reasonably achievable. COMPARISON: None. HISTORY: ORDERING SYSTEM PROVIDED HISTORY: LLQ pain TECHNOLOGIST PROVIDED HISTORY: Reason for exam:->LLQ pain Additional Contrast?->None FINDINGS: There is thickened appearance of wall of sigmoid colon with surrounding inflammatory changes and prominence of vascular arcades. Multiple diverticula are present. No loculated fluid collections to suggest abscess. Small free fluid layers in sigmoid mesentery. Large amount of stool throughout colon. No bowel obstruction. No free air. Liver, gallbladder, and spleen are unremarkable. Cystic lesion is associated with head of the pancreas which measures 9 by 11 mm on axial image number 42. No evidence of acute pancreatitis. No hydronephrosis or perinephric fat stranding. No evidence of pneumonia in the lung bases. 1.  Inflammatory changes associated with sigmoid colon with multiple diverticula. Findings could suggest acute diverticulitis, colitis, or neoplasm. Short-term follow-up recommended. 2.  Large amount of stool throughout colon. 3.  Small free fluid located in pelvis. No evidence of abscess or free air. 4.  Cystic lesion associated with pancreatic head could suggest pancreatic pseudocyst or cystic pancreatic neoplasm. MRI with and without contrast could be helpful for further evaluation. Xr Chest Portable    Result Date: 11/28/2020  EXAMINATION: ONE XRAY VIEW OF THE CHEST 11/28/2020 1:42 pm COMPARISON: None. HISTORY: ORDERING SYSTEM PROVIDED HISTORY: Weakness and dizziness TECHNOLOGIST PROVIDED HISTORY: Reason for exam:->Weakness and dizziness FINDINGS: The right lung is clear. There is a left lung base infiltrate and small left pleural effusion. The left upper lobe is clear. The cardiac silhouette is within normal limits. .    Left lower lobe infiltrate and small left pleural effusion      Resident's Assessment and Plan     Devorah Elizabeth is a 70 y.o. female with a past medical history that includes a right pontine ischemic lacunar stroke in 6/2019 with minimal left hemiparesis and dysarthria, meningioma status post resection in 2002 associated with a focal onset seizure disorder who is currently being managed for acute diverticulitis and a periumbilical abscess with fistula formation    Acute diverticulitis  - Patient is a history of diverticulosis and previous episode diverticulitis with last episode being 11/20, at which point she was treated with Flagyl and cefdinir patient reports she has completed both  - Am not sure if her diverticulitis and her periumbilical abscess are independent or related to each other  - She is currently afebrile, with no leukocytosis, normal lactic acid; started empirically on Zosyn in the ED  · At this point in time we will continue antibiotic management with Zosyn for her likely uncomplicated acute diverticulitis  · Obtain and follow blood cultures  · N.p.o. for now  · Daily labs    Periumbilical abscess with fistula formation  - Patient has 6.1 x 4.4 cm abscess just deep to the left anterior abdominal wall, but appears connected to a smaller abscess which is connected to the abdominal wall via fistula formation expectorating yellow purulent fluid  - General surgery consultation initiated in the emergency department  · At this point in time will make patient n.p.o. for now while awaiting general surgery recommendations. Patient also takes aspirin and Plavix for stroke in 2019, will hold for now and resume if surgical intervention is not indicated. History of right pontine ischemic lacunar stroke in 6/2019  - Per chart review apparently has minimal left hemiparesis and dysarthria, however strength and speech were normal and equal throughout on exam this evening  - She takes baby aspirin daily in addition to Plavix 75 mg daily for stroke prophylaxis.   Also takes Lipitor 40 mg nightly  · Continue aspirin and Plavix if surgical intervention is not indicated  · Continue Lipitor    History of meningioma s/p resection in 2002 associated with focal onset seizure disorder  - She has not had any other seizures since, and takes Lamictal 100 mg twice daily and Keppra 500 mg twice daily for seizure prophylaxis  · Continue Keppra and Lamictal    DVT prophylaxis/ GI prophylaxis: ICD  Disposition: Admit to general floor    Mamie Jensen DO, PGY-1   Attending physician: Dr. Wolfgang Jeffery

## 2020-12-15 NOTE — PROGRESS NOTES
[facility]  [facility] Residency Program  Progress Note - House Team 2    Patient:  Daniella Blood 70 y.o. female MRN: [id number]     Date of Service: 12/15/2020     CC: Abdominal pain and wound drainage  Overnight events: None  Hospital Day: 2    Subjective     Patient was seen and evaluated at bedside this morning. She reports that her abdominal pain began approximately two weeks ago but denies this pain this morning. She has continued LLQ pain. She denies any colonoscopies as well as any familial cancer history. She denies any nausea, vomiting, fever, chills, cough, headache. She notes 2 weeks of diarrhea at night that ended 4 days ago. She denies any other symptoms at this time. Objective     Physical Exam:  Vitals: /60   Pulse 79   Temp 97.9 °F (36.6 °C) (Temporal)   Resp 18   Ht 5' 2\" (1.575 m)   Wt 118 lb (53.5 kg)   LMP  (LMP Unknown)   SpO2 96%   BMI 21.58 kg/m²     I & O - 24hr: No intake or output data in the 24 hours ending 12/15/20 1251    · General Appearance: alert, appears stated age and cooperative  · HEENT:  Head: Normocephalic, no lesions, without obvious abnormality. · Neck: no adenopathy, no carotid bruit, no JVD, supple, symmetrical, trachea midline and thyroid not enlarged, symmetric, no tenderness/mass/nodules  · Lung: clear to auscultation bilaterally  · Heart: regular rate and rhythm, S1, S2 normal, no murmur, click, rub or gallop  · Abdomen: LLQ tenderness, 1 cm erythematous lesion just inferior to umbilicus with purulent drainage. No rebound or guarding  · Extremities:  extremities normal, atraumatic, no cyanosis or edema  · Musculokeletal: No joint swelling, no muscle tenderness. ROM normal in all joints of extremities.    · Neurologic: Mental status: Alert, oriented, thought content appropriate    Pertinent Labs & Imaging Studies     CBC with Differential:    Lab Results   Component Value Date    WBC 10.9 12/15/2020    RBC 3.63 12/15/2020 HGB 10.1 12/15/2020    HCT 32.4 12/15/2020     12/15/2020    MCV 89.3 12/15/2020    MCH 27.8 12/15/2020    MCHC 31.2 12/15/2020    RDW 14.5 12/15/2020    METASPCT 1.0 11/28/2020    LYMPHOPCT 16.4 12/14/2020    MONOPCT 6.3 12/14/2020    BASOPCT 0.2 12/14/2020    MONOSABS 0.58 12/14/2020    LYMPHSABS 1.51 12/14/2020    EOSABS 0.04 12/14/2020    BASOSABS 0.02 12/14/2020     CMP:    Lab Results   Component Value Date     12/15/2020    K 4.3 12/15/2020    K 3.9 12/14/2020    CL 97 12/15/2020    CO2 29 12/15/2020    BUN 10 12/15/2020    CREATININE 0.8 12/15/2020    GFRAA >60 12/15/2020    LABGLOM >60 12/15/2020    GLUCOSE 52 12/15/2020    PROT 6.6 12/15/2020    LABALBU 2.8 12/15/2020    CALCIUM 9.2 12/15/2020    BILITOT 0.3 12/15/2020    ALKPHOS 105 12/15/2020    AST 18 12/15/2020    ALT 5 12/15/2020     Albumin:    Lab Results   Component Value Date    LABALBU 2.8 12/15/2020       Resident's Assessment and Plan     Smitha Cramer is a 70 y.o. female that complains of abdominal pain and wound drainage. 1. Acute diverticulitis  o Continue zosyn  o Monitor labs    2. Periumbilical abscess with fistula   o Hold aspirin and plavix until Gen Surg recommendation  o NPO  o TEG results to plan for percutaneous drain    3. S/P Meningioma Resection (2002)  o Continue Keppra and Lamictal    4.  Stroke (2019)  o Resume prophylactic aspirin and plavix      Arun Dyson  MS3  Attending Physician: Dr. Jesus Weiss

## 2020-12-15 NOTE — ED PROVIDER NOTES
Patient is 61-year-old female presented emergency department with a what the patient states is a possible abscess under her umbilicus that burst department a week ago and has been taking antibiotics for and then having some new onset worsening lower left quadrant abdominal pain that has been worsening over the last week. States she does have a prominent history of diverticulosis and has had previous episode of diverticulitis. Patient states that pain in the lower left quadrant is similar to previous episodes however the pain in her mid abdomen is new. Patient denies any ongoing fever or chills nausea or vomiting. She states she has been taking an antibiotic outpatient for the last week for outpatient therapy however she came to the emergency department due to worsening of her abdominal pain. Symptoms are worsened by palpation of the abdomen       Symptoms are improved by nothing    Denies any associated chest pain, fever, chills, nausea,    Review of Systems   Constitutional: Negative for chills and fever. HENT: Negative for ear pain and sore throat. Eyes: Negative for pain. Respiratory: Negative for shortness of breath. Cardiovascular: Negative for chest pain. Gastrointestinal: Positive for abdominal pain. Negative for nausea and vomiting. Genitourinary: Negative for flank pain and pelvic pain. Musculoskeletal: Negative for back pain and neck pain. Skin: Positive for wound (Small wound on middle of her umbilicus). Negative for rash. Neurological: Negative for headaches. Physical Exam  Vitals signs and nursing note reviewed. Constitutional:       General: She is not in acute distress. Appearance: She is well-developed. She is ill-appearing. HENT:      Head: Normocephalic and atraumatic.       Right Ear: External ear normal.      Left Ear: External ear normal.      Nose: Nose normal.      Mouth/Throat:      Mouth: Mucous membranes are moist.      Pharynx: Oropharynx is clear. Eyes:      Pupils: Pupils are equal, round, and reactive to light. Neck:      Musculoskeletal: Normal range of motion and neck supple. Cardiovascular:      Rate and Rhythm: Normal rate and regular rhythm. Heart sounds: Normal heart sounds. Pulmonary:      Effort: Pulmonary effort is normal. No respiratory distress. Breath sounds: Normal breath sounds. Abdominal:      General: Abdomen is flat. Palpations: Abdomen is soft. Tenderness: There is abdominal tenderness in the epigastric area and left lower quadrant. Musculoskeletal:         General: No swelling or tenderness. Skin:     General: Skin is warm and dry. Findings: No rash. Neurological:      Mental Status: She is alert and oriented to person, place, and time. Cranial Nerves: No cranial nerve deficit. Procedures         MDM  Number of Diagnoses or Management Options  Diagnosis management comments: 79-year-old female presented emergency department with lower left quadrant abdominal pain and periumbilical abscess. He previously been on antibiotics and currently is for this diverticulitis however it is currently getting worse. CT imaging found multiloculated lesion around the umbilicus as well as severe diverticulitis throughout her abdomen. Multiple findings patient was started on Zosyn will admit to the hospital with consultation to general surgery with regard to her abscess and diverticulitis.                --------------------------------------------- PAST HISTORY ---------------------------------------------  Past Medical History:  has a past medical history of Cerebral artery occlusion with cerebral infarction [facility]), Hemiparesis affecting left side as late effect of stroke ([facility] [address] Hyperlipidemia, and Localization-related epilepsy ([facility] [address] Past Surgical History:  has a past surgical history that includes Hysterectomy; Hysterectomy, vaginal (2014); and Brain meningioma excision.     Social History:  reports that she is a non-smoker but has been exposed to tobacco smoke. She has never used smokeless tobacco. She reports that she does not drink alcohol or use drugs. Family History: family history is not on file. The patients home medications have been reviewed. Allergies: Patient has no known allergies.     -------------------------------------------------- RESULTS -------------------------------------------------    LABS:  Results for orders placed or performed during the hospital encounter of 12/14/20   Comprehensive Metabolic Panel w/ Reflex to MG   Result Value Ref Range    Sodium 136 132 - 146 mmol/L    Potassium reflex Magnesium 3.9 3.5 - 5.0 mmol/L    Chloride 99 98 - 107 mmol/L    CO2 28 22 - 29 mmol/L    Anion Gap 9 7 - 16 mmol/L    Glucose 95 74 - 99 mg/dL    BUN 11 8 - 23 mg/dL    CREATININE 0.6 0.5 - 1.0 mg/dL    GFR Non-African American >60 >=60 mL/min/1.73    GFR African American >60     Calcium 9.1 8.6 - 10.2 mg/dL    Total Protein 6.7 6.4 - 8.3 g/dL    Alb 3.0 (L) 3.5 - 5.2 g/dL    Total Bilirubin 0.3 0.0 - 1.2 mg/dL    Alkaline Phosphatase 109 (H) 35 - 104 U/L    ALT 17 0 - 32 U/L    AST 23 0 - 31 U/L   CBC Auto Differential   Result Value Ref Range    WBC 9.2 4.5 - 11.5 E9/L    RBC 3.87 3.50 - 5.50 E12/L    Hemoglobin 10.7 (L) 11.5 - 15.5 g/dL    Hematocrit 34.5 34.0 - 48.0 %    MCV 89.1 80.0 - 99.9 fL    MCH 27.6 26.0 - 35.0 pg    MCHC 31.0 (L) 32.0 - 34.5 %    RDW 14.5 11.5 - 15.0 fL    Platelets 074 (H) 675 - 450 E9/L    MPV 8.2 7.0 - 12.0 fL    Neutrophils % 75.5 43.0 - 80.0 %    Immature Granulocytes % 1.2 0.0 - 5.0 %    Lymphocytes % 16.4 (L) 20.0 - 42.0 %    Monocytes % 6.3 2.0 - 12.0 %    Eosinophils % 0.4 0.0 - 6.0 %    Basophils % 0.2 0.0 - 2.0 %    Neutrophils Absolute 6.95 1.80 - 7.30 E9/L    Immature Granulocytes # 0.11 E9/L    Lymphocytes Absolute 1.51 1.50 - 4.00 E9/L    Monocytes Absolute 0.58 0.10 - 0.95 E9/L    Eosinophils Absolute 0.04 (L) 0.05 - 0.50 E9/L Basophils Absolute 0.02 0.00 - 0.20 E9/L   Lactate, Sepsis   Result Value Ref Range    Lactic Acid, Sepsis 0.8 0.5 - 1.9 mmol/L       RADIOLOGY:  CT ABDOMEN PELVIS W IV CONTRAST Additional Contrast? None   Final Result   Multiloculated fluid collections with internal air-fluid levels in the left   anterior abdomen (most likely representing abscess), abutting against   adjacent loops of bowel and anterior abdominal wall, which is thickened and   appears inflamed. In addition there is also communication with the anterior   abdominal wall in the midline at the level of the iliac crest, compatible   with fistula formation. Thickened sigmoid colon with surrounding inflammatory changes, may represent   progressing sigmoid diverticulitis, or other inflammatory/ischemic process. New pleuroparenchymal changes with focal consolidation and fluid collection   at the left lung base/lower thorax. Small perisplenic/subcapsular splenic collection.              ------------------------- NURSING NOTES AND VITALS REVIEWED ---------------------------  Date / Time Roomed:  12/14/2020  7:14 PM  ED Bed Assignment:  [facility]    The nursing notes within the ED encounter and vital signs as below have been reviewed. Patient Vitals for the past 24 hrs:   BP Temp Pulse Resp SpO2 Height Weight   12/14/20 1455 126/81 -- 87 16 96 % 5' 2\" (1.575 m) 118 lb (53.5 kg)   12/14/20 1448 -- 96.2 °F (35.7 °C) 90 -- 92 % -- --       Oxygen Saturation Interpretation: Normal    ------------------------------------------ PROGRESS NOTES ------------------------------------------  Re-evaluation(s):  Time: 4600  Patients symptoms show no change  Repeat physical examination is not changed    Counseling:  I have spoken with the patient and discussed todays results, in addition to providing specific details for the plan of care and counseling regarding the diagnosis and prognosis.   Their questions are answered at this time and they are agreeable with the plan of admission.    --------------------------------- ADDITIONAL PROVIDER NOTES ---------------------------------  Consultations:  Time: 0045. Spoke with Dr. Catina Priest. Discussed case. They will admit the patient. This patient's ED course included: a personal history and physicial examination, re-evaluation prior to disposition, multiple bedside re-evaluations, IV medications, cardiac monitoring, continuous pulse oximetry and complex medical decision making and emergency management    This patient has remained hemodynamically stable during their ED course. Diagnosis:  1. Diverticulitis of colon    2. Abdominal pain, unspecified abdominal location    3. Abdominal wall abscess        Disposition:  Patient's disposition: Admit to med/surg floor  Patient's condition is stable.                 Nette Hare DO  Resident  12/15/20 1593

## 2020-12-15 NOTE — CONSULTS
 [Held by provider] aspirin  81 mg Oral Daily    lamoTRIgine  100 mg Oral BID    levETIRAcetam  500 mg Oral BID    atorvastatin  40 mg Oral Nightly    folic acid  1 mg Oral Daily    [Held by provider] clopidogrel  75 mg Oral Daily    piperacillin-tazobactam  3.375 g Intravenous Q8H    And    sodium chloride  25 mL Intravenous Q8H     Continuous Infusions:   dextrose 5 % and 0.45 % NaCl 100 mL/hr at 12/15/20 0827     PRN Meds:sodium chloride flush, promethazine **OR** ondansetron, polyethylene glycol, acetaminophen **OR** acetaminophen    Allergies:  Patient has no known allergies.     Social History:   Social History     Socioeconomic History    Marital status:      Spouse name: None    Number of children: None    Years of education: None    Highest education level: None   Occupational History    None   Social Needs    Financial resource strain: None    Food insecurity     Worry: None     Inability: None    Transportation needs     Medical: None     Non-medical: None   Tobacco Use    Smoking status: Passive Smoke Exposure - Never Smoker    Smokeless tobacco: Never Used    Tobacco comment: Pt's  was heavy smoker   Substance and Sexual Activity    Alcohol use: Never     Frequency: Never    Drug use: Never    Sexual activity: None   Lifestyle    Physical activity     Days per week: None     Minutes per session: None    Stress: None   Relationships    Social connections     Talks on phone: None     Gets together: None     Attends Shinto service: None     Active member of club or organization: None     Attends meetings of clubs or organizations: None     Relationship status: None    Intimate partner violence     Fear of current or ex partner: None     Emotionally abused: None     Physically abused: None     Forced sexual activity: None   Other Topics Concern    None   Social History Narrative    None     Tobacco: No  Alcohol: No  Pets: No  Travel: No    Family History: History reviewed. No pertinent family history. . Otherwise non-pertinent to the chief complaint. REVIEW OF SYSTEMS:    CONSTITUTIONAL:  No chills, fevers or night sweats. No loss of weight. EYES:  No double vision or drainage from eyes, ears or throat. HEENT:  No neck stiffness. No dysphagia. No drainage from eyes, ears or throat  RESPIRATORY:  No cough, productive sputum or hemoptysis. CARDIOVASCULAR:  No chest pain, palpitations, orthopnea or dyspnea on exertion. GASTROINTESTINAL:  No nausea, vomiting, diarrhea or constipation or hematochezia see history of present illness   GENITOURINARY:  No frequency burning dysuria or hematuria. INTEGUMENT/BREAST:  No rash or breast masses. HEMATOLOGIC/LYMPHATIC:  No lymphadenopathy or blood dyscrasics. ALLERGIC/IMMUNOLOGIC:  No anaphylaxis. ENDOCRINE:  No polyuria or polydipsia or temperature intolerance. MUSCULOSKELETAL:  No myalgia or arthralgia. Full ROM. NEUROLOGICAL:  No focal motor sensory deficit. BEHAVIOR/PSYCH:  No psychosis. PHYSICAL EXAM:    Vitals:    /60   Pulse 79   Temp 97.9 °F (36.6 °C) (Temporal)   Resp 18   Ht 5' 2\" (1.575 m)   Wt 118 lb (53.5 kg)   LMP  (LMP Unknown)   SpO2 96%   BMI 21.58 kg/m²   Constitutional: The patient is awake, alert, and oriented. Skin: Warm and dry. No rashes were noted. No jaundice. HEENT: Eyes show round, and reactive pupils. Moist mucous membranes, no ulcerations, no thrush. Neck: Supple to movements. No lymphadenopathy. Chest: No use of accessory muscles to breathe. Symmetrical expansion. Auscultation reveals no wheezing, crackles, or rhonchi. Cardiovascular: S1 and S2 are rhythmic and regular. No murmurs appreciated. Abdomen: Positive bowel sounds to auscultation. Benign to palpation. No masses felt. No hepatosplenomegaly. Genitourinary: Female  Extremities: No clubbing, no cyanosis, no edema. Musculoskeletal: Full and symmetrical  Neurological: No foot.   Lines: peripheral      CBC+dif:  Recent Labs     12/14/20  1503 12/15/20  0545   WBC 9.2 10.9   HGB 10.7* 10.1*   HCT 34.5 32.4*   MCV 89.1 89.3   * 777*   NEUTROABS 6.95  --      No results found for: CRP  No results found for: CRPHS  No results found for: SEDRATE  Lab Results   Component Value Date    ALT 5 12/15/2020    AST 18 12/15/2020    GGT 24 01/09/2020    ALKPHOS 105 (H) 12/15/2020    BILITOT 0.3 12/15/2020     Lab Results   Component Value Date     12/15/2020    K 4.3 12/15/2020    K 3.9 12/14/2020    CL 97 12/15/2020    CO2 29 12/15/2020    BUN 10 12/15/2020    CREATININE 0.8 12/15/2020    GFRAA >60 12/15/2020    LABGLOM >60 12/15/2020    GLUCOSE 52 12/15/2020    PROT 6.6 12/15/2020    LABALBU 2.8 12/15/2020    CALCIUM 9.2 12/15/2020    BILITOT 0.3 12/15/2020    ALKPHOS 105 12/15/2020    AST 18 12/15/2020    ALT 5 12/15/2020       Lab Results   Component Value Date    PROTIME 13.1 12/15/2020    INR 1.2 12/15/2020       No results found for: TSH    Lab Results   Component Value Date    COLORU Yellow 11/28/2020    PHUR 6.0 11/28/2020    WBCUA 5-10 11/28/2020    RBCUA 1-3 11/28/2020    BACTERIA MODERATE 11/28/2020    CLARITYU Clear 11/28/2020    SPECGRAV 1.025 11/28/2020    LEUKOCYTESUR Negative 11/28/2020    UROBILINOGEN 0.2 11/28/2020    BILIRUBINUR Negative 11/28/2020    BLOODU MODERATE 11/28/2020    GLUCOSEU Negative 11/28/2020       No results found for: JTB9CVG, BEART, C5TUJJRA, PHART, THGBART, ZYK1TUA, PO2ART, YJD7VBY  Radiology:  CT ABDOMEN PELVIS W IV CONTRAST Additional Contrast? None   Final Result   Multiloculated fluid collections with internal air-fluid levels in the left   anterior abdomen (most likely representing abscess), abutting against   adjacent loops of bowel and anterior abdominal wall, which is thickened and   appears inflamed.   In addition there is also communication with the anterior   abdominal wall in the midline at the level of the iliac crest, compatible   with fistula formation. Thickened sigmoid colon with surrounding inflammatory changes, may represent   progressing sigmoid diverticulitis, or other inflammatory/ischemic process. New pleuroparenchymal changes with focal consolidation and fluid collection   at the left lung base/lower thorax. Small perisplenic/subcapsular splenic collection. CT ABSCESS DRAINAGE W CATH PLACEMENT S&I    (Results Pending)       Microbiology:  Pending  No results for input(s): BC in the last 72 hours. No results for input(s): ORG in the last 72 hours. No results for input(s): Larance Frame in the last 72 hours. No results for input(s): STREPNEUMAGU in the last 72 hours. No results for input(s): LP1UAG in the last 72 hours. No results for input(s): ASO in the last 72 hours. No results for input(s): CULTRESP in the last 72 hours. Assessment:  · Intra-abdominal abscess  · Cystic lesions at the head of the pancreas  · Diverticulitis partially treated    Plan:    · Cont Zosyn and add fluconazole  · CT-guided aspiration of the abscesses  · Work-up of the lesion in the head of the pancreas  · PICC line  · Check cultures  · Baseline ESR, CRP  · Monitor labs  · Will follow with you    Thank you for having us see this patient in consultation. I will be discussing this case with the treating physicians.       Electronically signed by Anand Posadas MD on 12/15/2020 at 12:40 PM

## 2020-12-15 NOTE — CONSULTS
Hepatobiliary and Pancreatic Surgery Attending Consult Note    Patient's Name/Date of Birth: Manuelito Cadet /1945 (38 y.o.)    Date: December 17, 2020    CC: Abdominal Pain    HPI:  Patient was sent to the ED on 12/14 by her PCP, Dr. Jorge Kelley, for a spontaneously draining abdominal wound. She was recently diagnosed with pneumonia and diverticulitis on 11/20 by abdominal CT, with incidental finding of a pancreatic cystic lesion concerning for pseudocyst or neoplasm. She received Cefdinir, Flagyl and Levaquin. She underwent an MRI on 12/11 for further evaluation of the pancreatic lesion. During the study she felt her stomach pop and noticed it was draining cream colored fluid. She managed this drainage with gauze and saw her PCP on 12/14 who then sent her to the ED. Repeat CT demonstrated diverticulitis with large abdominal wall abscess. General surgery is following for diverticulitis and abdominal wall abscess. Surgical oncology was consulted for pancreatic cystic lesions within the head and uncinate process. The patient was scheduled to see Dr. Cristina Bell this coming Friday for further evaluation, however he will see the patient during her hospital stay. Currently, she does not have any abdominal pain or active drainage from her umbilicus. She denies any nausea, vomiting, melena, hematochezia, fever, or chills. She has no family history of Crohns, UC, or colon CA. She has had a brain meningioma removal and vaginal hysterectomy in the past, but denies having any abdominal surgeries.  She is currently on ASA/Plavix daily due to previous stroke.     She has been hemodynamically stable, afebrile, and does not have a leukocytosis    Past Medical History:   Diagnosis Date    Cerebral artery occlusion with cerebral infarction ([address]     Hemiparesis affecting left side as late effect of stroke ([address] 12/4/2020    Hyperlipidemia     Localization-related epilepsy ([address] 6/25/2019       Past Surgical History:   Procedure Laterality Date    BRAIN MENINGIOMA EXCISION      HYSTERECTOMY      HYSTERECTOMY, VAGINAL  2014    Total       Current Facility-Administered Medications   Medication Dose Route Frequency Provider Last Rate Last Admin    sodium chloride flush 0.9 % injection 10 mL  10 mL Intravenous 2 times per day James Ring MD   10 mL at 12/15/20 0834    sodium chloride flush 0.9 % injection 10 mL  10 mL Intravenous PRN James Ring MD        promethazine (PHENERGAN) tablet 12.5 mg  12.5 mg Oral Q6H PRN James Ring MD        Or    ondansetron [facility]) injection 4 mg  4 mg Intravenous Q6H PRN James Ring MD        polyethylene glycol (GLYCOLAX) packet 17 g  17 g Oral Daily PRN James Ring MD        acetaminophen (TYLENOL) tablet 650 mg  650 mg Oral Q6H PRN James Ring MD        Or    acetaminophen (TYLENOL) suppository 650 mg  650 mg Rectal Q6H PRN James Ring MD        [Held by provider] enoxaparin (LOVENOX) injection 40 mg  40 mg Subcutaneous Daily James Ring MD        [Held by provider] aspirin chewable tablet 81 mg  81 mg Oral Daily James Ring MD        lamoTRIgine (LAMICTAL) tablet 100 mg  100 mg Oral BID James Ring MD   100 mg at 12/15/20 0973    levETIRAcetam (KEPPRA) tablet 500 mg  500 mg Oral BID James Ring MD   500 mg at 12/15/20 1077    atorvastatin (LIPITOR) tablet 40 mg  40 mg Oral Nightly James Ring MD        folic acid (FOLVITE) tablet 1 mg  1 mg Oral Daily James Ring MD   1 mg at 12/15/20 1132    [Held by provider] clopidogrel (PLAVIX) tablet 75 mg  75 mg Oral Daily James Ring MD        piperacillin-tazobactam (ZOSYN) 3.375 g in dextrose 5 % 100 mL IVPB extended infusion (mini-bag)  3.375 g Intravenous Q8H Crystal Payam, DO 25 mL/hr at 12/15/20 0834 3.375 g at 12/15/20 9986    And    0.9 % sodium chloride infusion admixture  25 mL Intravenous Q8H Willard Giraldo DO        dextrose 5 % and 0.45 % sodium chloride infusion   Intravenous Continuous Kennieth Fossa,  mL/hr at 12/15/20 0827 New Bag at 12/15/20 0827       No Known Allergies    History reviewed. No pertinent family history. Social History     Socioeconomic History    Marital status:      Spouse name: Not on file    Number of children: Not on file    Years of education: Not on file    Highest education level: Not on file   Occupational History    Not on file   Social Needs    Financial resource strain: Not on file    Food insecurity     Worry: Not on file     Inability: Not on file    Transportation needs     Medical: Not on file     Non-medical: Not on file   Tobacco Use    Smoking status: Passive Smoke Exposure - Never Smoker    Smokeless tobacco: Never Used    Tobacco comment: Pt's  was heavy smoker   Substance and Sexual Activity    Alcohol use: Never     Frequency: Never    Drug use: Never    Sexual activity: Not on file   Lifestyle    Physical activity     Days per week: Not on file     Minutes per session: Not on file    Stress: Not on file   Relationships    Social connections     Talks on phone: Not on file     Gets together: Not on file     Attends Pentecostal service: Not on file     Active member of club or organization: Not on file     Attends meetings of clubs or organizations: Not on file     Relationship status: Not on file    Intimate partner violence     Fear of current or ex partner: Not on file     Emotionally abused: Not on file     Physically abused: Not on file     Forced sexual activity: Not on file   Other Topics Concern    Not on file   Social History Narrative    Not on file       ROS:   Review of Systems   Constitutional: Positive for appetite change and fatigue. Negative for unexpected weight change. HENT: Negative for sore throat, trouble swallowing and voice change. Eyes: Negative for pain and visual disturbance. Respiratory: Negative for cough and shortness of breath.     Cardiovascular: Negative for chest pain and leg swelling. Gastrointestinal: Positive for abdominal pain and diarrhea. Negative for blood in stool. Endocrine: Negative for cold intolerance, heat intolerance and polydipsia. Genitourinary: Negative for difficulty urinating, dysuria and hematuria. Musculoskeletal: Negative for back pain, joint swelling and myalgias. Skin: Negative for color change and rash. Allergic/Immunologic: Negative for environmental allergies and food allergies. Neurological: Positive for light-headedness. Negative for syncope and headaches. Hematological: Negative for adenopathy. Bruises/bleeds easily. Psychiatric/Behavioral: Negative for dysphoric mood. The patient is not nervous/anxious. Physical Exam:  Vitals:    12/15/20 0900   BP: 102/60   Pulse: 79   Resp: 18   Temp: 97.9 °F (36.6 °C)   SpO2: 96%       PSYCH: mood and affect normal, alert and oriented x 3  CONSTITUTIONAL: No apparent distress, comfortable  EYES: Sclera white, pupils equal round and reactive to light  ENMT:  Hearing normal, trachea midline, ears externally intact  LYMPH: no lympadenopathy in neck. Nolympadenopathy in groins  RESP: Respiratory effort was normal with no retractions or use of accessory muscles. CV: RR. No pedal edema  GI/ Abdomen: Infraumbilical area of scabbing. There was no tenderness, guarding, rebound, or rigidity. There was no masses, hepatosplenomegaly, or hernias. No inguinal hernias were noted on coughing and straining.   MSK: no clubbing/ no cyanosis/ gaitnormal       Assessment/Plan: Incidental pancreatic head cystic lesion measuring approximately 0.8 cm.    -Recommend follow up imaging in 3 months for surveillance   -Patient has been evaluated while in the hospital and does not need to attend the appointment that was previously scheduled for this Friday  -A biopsy cannot be performed until it measures at least 1.5 cm  - CEA = 2.2 and CA 19-9 pending  -No concerning symptoms for malignancy at this time  -Recommend colonoscopy once diverticulitis has resolved  - followup in my office once discharged  - discussed the above with Ms. Tj Hall    Electronically signed by Hugh Weiner MD on 12/17/2020 at 12:08 PM

## 2020-12-15 NOTE — PROGRESS NOTES
Patient was consulted to IR for a periumbilical abdominal abscess drain, last imaged 12/14 with a left anterior abdominal fluid collection. I called the floor and spoke with Nina Cardenas RN. Patient states she took her aspirin and Plavix at home yesterday, 12/14. Thinners have been held at the hospital.  Patient has been NPO. PMH significant for epilepsy, left hemiparesis s/p stroke, and diverticulitis. The patient has been NPO since midnight. /79   Pulse 97   Temp 97.4 °F (36.3 °C) (Temporal)   Resp 17   Ht 5' 2\" (1.575 m)   Wt 118 lb (53.5 kg)   LMP  (LMP Unknown)   SpO2 96%   BMI 21.58 kg/m² , NSR, Room Air    Lab Results   Component Value Date    INR 1.2 12/15/2020    PROTIME 13.1 (H) 12/15/2020     (H) 12/15/2020    CREATININE 0.8 12/15/2020    BUN 10 12/15/2020    LABGLOM >60 12/15/2020    GFRAA >60 12/15/2020    HGB 10.1 (L) 12/15/2020    HCT 32.4 (L) 12/15/2020    WBC 10.9 12/15/2020    COVID19 Not Detected 12/03/2020     ALLERGIES: Patient has no known allergies. Lead tech and physician will be informed. Normally aspirin is to be held for 7 days and Plavix for 5 days per IR guidelines. Will follow up with floor.

## 2020-12-16 LAB
ANION GAP SERPL CALCULATED.3IONS-SCNC: 8 MMOL/L (ref 7–16)
APTT: 25.4 SEC (ref 24.5–35.1)
BUN BLDV-MCNC: 5 MG/DL (ref 8–23)
CALCIUM SERPL-MCNC: 8.5 MG/DL (ref 8.6–10.2)
CHLORIDE BLD-SCNC: 103 MMOL/L (ref 98–107)
CO2: 27 MMOL/L (ref 22–29)
CREAT SERPL-MCNC: 0.6 MG/DL (ref 0.5–1)
GFR AFRICAN AMERICAN: >60
GFR NON-AFRICAN AMERICAN: >60 ML/MIN/1.73
GLUCOSE BLD-MCNC: 95 MG/DL (ref 74–99)
HCT VFR BLD CALC: 30.6 % (ref 34–48)
HEMOGLOBIN: 9.5 G/DL (ref 11.5–15.5)
MCH RBC QN AUTO: 27.1 PG (ref 26–35)
MCHC RBC AUTO-ENTMCNC: 31 % (ref 32–34.5)
MCV RBC AUTO: 87.4 FL (ref 80–99.9)
PATHOLOGIST REVIEW: NORMAL
PDW BLD-RTO: 14.3 FL (ref 11.5–15)
PLATELET # BLD: 752 E9/L (ref 130–450)
PMV BLD AUTO: 8.2 FL (ref 7–12)
POTASSIUM SERPL-SCNC: 3.7 MMOL/L (ref 3.5–5)
RBC # BLD: 3.5 E12/L (ref 3.5–5.5)
SODIUM BLD-SCNC: 138 MMOL/L (ref 132–146)
WBC # BLD: 9.8 E9/L (ref 4.5–11.5)

## 2020-12-16 PROCEDURE — 6370000000 HC RX 637 (ALT 250 FOR IP): Performed by: SPECIALIST

## 2020-12-16 PROCEDURE — 6360000002 HC RX W HCPCS: Performed by: EMERGENCY MEDICINE

## 2020-12-16 PROCEDURE — C1751 CATH, INF, PER/CENT/MIDLINE: HCPCS

## 2020-12-16 PROCEDURE — 02HV33Z INSERTION OF INFUSION DEVICE INTO SUPERIOR VENA CAVA, PERCUTANEOUS APPROACH: ICD-10-PCS | Performed by: INTERNAL MEDICINE

## 2020-12-16 PROCEDURE — 76937 US GUIDE VASCULAR ACCESS: CPT

## 2020-12-16 PROCEDURE — 2580000003 HC RX 258: Performed by: SPECIALIST

## 2020-12-16 PROCEDURE — 6370000000 HC RX 637 (ALT 250 FOR IP): Performed by: INTERNAL MEDICINE

## 2020-12-16 PROCEDURE — 1200000000 HC SEMI PRIVATE

## 2020-12-16 PROCEDURE — 2580000003 HC RX 258: Performed by: EMERGENCY MEDICINE

## 2020-12-16 PROCEDURE — 80048 BASIC METABOLIC PNL TOTAL CA: CPT

## 2020-12-16 PROCEDURE — 2580000003 HC RX 258: Performed by: INTERNAL MEDICINE

## 2020-12-16 PROCEDURE — 36569 INSJ PICC 5 YR+ W/O IMAGING: CPT

## 2020-12-16 PROCEDURE — 85027 COMPLETE CBC AUTOMATED: CPT

## 2020-12-16 PROCEDURE — 36415 COLL VENOUS BLD VENIPUNCTURE: CPT

## 2020-12-16 PROCEDURE — 99232 SBSQ HOSP IP/OBS MODERATE 35: CPT | Performed by: INTERNAL MEDICINE

## 2020-12-16 PROCEDURE — 85730 THROMBOPLASTIN TIME PARTIAL: CPT

## 2020-12-16 RX ADMIN — Medication 10 ML: at 08:30

## 2020-12-16 RX ADMIN — FOLIC ACID 1 MG: 1 TABLET ORAL at 08:29

## 2020-12-16 RX ADMIN — SODIUM CHLORIDE 25 ML: 9 INJECTION, SOLUTION INTRAVENOUS at 13:25

## 2020-12-16 RX ADMIN — ATORVASTATIN CALCIUM 40 MG: 40 TABLET, FILM COATED ORAL at 20:37

## 2020-12-16 RX ADMIN — SODIUM CHLORIDE, PRESERVATIVE FREE 10 ML: 5 INJECTION INTRAVENOUS at 08:29

## 2020-12-16 RX ADMIN — LAMOTRIGINE 100 MG: 100 TABLET ORAL at 08:28

## 2020-12-16 RX ADMIN — PIPERACILLIN AND TAZOBACTAM 3.38 G: 3; .375 INJECTION, POWDER, LYOPHILIZED, FOR SOLUTION INTRAVENOUS at 00:09

## 2020-12-16 RX ADMIN — LAMOTRIGINE 100 MG: 100 TABLET ORAL at 20:37

## 2020-12-16 RX ADMIN — FLUCONAZOLE 400 MG: 100 TABLET ORAL at 08:28

## 2020-12-16 RX ADMIN — SODIUM CHLORIDE, PRESERVATIVE FREE 10 ML: 5 INJECTION INTRAVENOUS at 20:38

## 2020-12-16 RX ADMIN — SODIUM CHLORIDE 25 ML: 9 INJECTION, SOLUTION INTRAVENOUS at 04:38

## 2020-12-16 RX ADMIN — LEVETIRACETAM 500 MG: 500 TABLET ORAL at 08:28

## 2020-12-16 RX ADMIN — PIPERACILLIN AND TAZOBACTAM 3.38 G: 3; .375 INJECTION, POWDER, LYOPHILIZED, FOR SOLUTION INTRAVENOUS at 16:58

## 2020-12-16 RX ADMIN — Medication 1 CAPSULE: at 08:29

## 2020-12-16 RX ADMIN — SODIUM CHLORIDE 25 ML: 9 INJECTION, SOLUTION INTRAVENOUS at 22:02

## 2020-12-16 RX ADMIN — LEVETIRACETAM 500 MG: 500 TABLET ORAL at 20:37

## 2020-12-16 RX ADMIN — PIPERACILLIN AND TAZOBACTAM 3.38 G: 3; .375 INJECTION, POWDER, LYOPHILIZED, FOR SOLUTION INTRAVENOUS at 08:29

## 2020-12-16 ASSESSMENT — PAIN SCALES - GENERAL
PAINLEVEL_OUTOF10: 0
PAINLEVEL_OUTOF10: 0

## 2020-12-16 NOTE — PROGRESS NOTES
[facility]  Internal Medicine Residency / [address]    Attending Physician Statement  I have discussed the case, including pertinent history and exam findings with the resident and the team.  I have seen and examined the patient and the key elements of the encounter have been performed by me. I have also personally reviewed imaging studies and labs. I agree with the assessment, plan and orders as documented by the resident. Doing better today - no abdominal pain, drainage from umbilical wound, fevers, nausea, vomiting. She remains on Zosyn and fluconazole. Blood cultures have been negative. Assessment:  1. Abdominal wall abscess  2. Diverticulitis  3. Thrombocytosis, likely reactive as the rise coincides with her recent infections. PBS reviewed. 4. Incidental finding of pancreatic head cystic lesion - evaluated by Dr Prisca Figueredo team yesterday, no need to go to scheduled follow up this week, recommend follow up imaging in 3 months  5. CVA 2019  6. S/p resection of meningioma  7. Seizures, on keppra and lamictal        Plan:  Plan for IR drainage today - if unable to do today, resume diet. Continue Zosyn, fluconazole  PICC line insertion today  Colonoscopy once diverticulitis resolves      Remainder of medical problems as per resident note. Olegario Burton MD  Internal Medicine Residency Faculty

## 2020-12-16 NOTE — PLAN OF CARE
Medical team contacted IR, there is no response. Per the nurse, the CT-guided drainage will not be done until Friday due to Plavix need to be held for 5 days. We will resume diet.      Electronically signed by Raul Burden MD on 12/16/2020 at 2:05 PM

## 2020-12-16 NOTE — PLAN OF CARE
Problem: Falls - Risk of:  Goal: Will remain free from falls  Description: Will remain free from falls  12/16/2020 1641 by Calvin Ramirez  Outcome: Met This Shift  12/16/2020 1058 by Isaias Moreno, RN  Outcome: Met This Shift  Goal: Absence of physical injury  Description: Absence of physical injury  12/16/2020 1641 by Calvin Ramirez  Outcome: Met This Shift  12/16/2020 1058 by Isaias Moreno, RN  Outcome: Met This Shift     Problem: Skin Integrity:  Goal: Will show no infection signs and symptoms  Description: Will show no infection signs and symptoms  12/16/2020 1058 by Isaias Moreno, RN  Outcome: Met This Shift  Goal: Absence of new skin breakdown  Description: Absence of new skin breakdown  12/16/2020 1641 by Calvin Ramirez  Outcome: Met This Shift  12/16/2020 1058 by Isaias Moreno, RN  Outcome: Met This Shift

## 2020-12-16 NOTE — PROGRESS NOTES
Differential:    Lab Results   Component Value Date    WBC 9.8 12/16/2020    RBC 3.50 12/16/2020    HGB 9.5 12/16/2020    HCT 30.6 12/16/2020     12/16/2020    MCV 87.4 12/16/2020    MCH 27.1 12/16/2020    MCHC 31.0 12/16/2020    RDW 14.3 12/16/2020    METASPCT 1.0 11/28/2020    LYMPHOPCT 8.9 12/15/2020    MONOPCT 4.9 12/15/2020    BASOPCT 0.4 12/15/2020    MONOSABS 0.53 12/15/2020    LYMPHSABS 0.96 12/15/2020    EOSABS 0.08 12/15/2020    BASOSABS 0.04 12/15/2020     BMP:    Lab Results   Component Value Date     12/16/2020    K 3.7 12/16/2020    K 3.9 12/14/2020     12/16/2020    CO2 27 12/16/2020    BUN 5 12/16/2020    LABALBU 2.8 12/15/2020    CREATININE 0.6 12/16/2020    CALCIUM 8.5 12/16/2020    GFRAA >60 12/16/2020    LABGLOM >60 12/16/2020    GLUCOSE 95 12/16/2020     Hepatic Function Panel:    Lab Results   Component Value Date    ALKPHOS 105 12/15/2020    ALT 5 12/15/2020    AST 18 12/15/2020    PROT 6.6 12/15/2020    BILITOT 0.3 12/15/2020    BILIDIR <0.2 12/15/2020    IBILI see below 12/15/2020    LABALBU 2.8 12/15/2020     Ionized Calcium:  No results found for: IONCA  Magnesium:  No results found for: MG  Phosphorus:  No results found for: PHOS  LDH:  No results found for: LDH  Troponin:    Lab Results   Component Value Date    TROPONINI <0.01 11/28/2020     U/A:    Lab Results   Component Value Date    COLORU Yellow 11/28/2020    PROTEINU 30 11/28/2020    PHUR 6.0 11/28/2020    WBCUA 5-10 11/28/2020    RBCUA 1-3 11/28/2020    BACTERIA MODERATE 11/28/2020    CLARITYU Clear 11/28/2020    SPECGRAV 1.025 11/28/2020    LEUKOCYTESUR Negative 11/28/2020    UROBILINOGEN 0.2 11/28/2020    BILIRUBINUR Negative 11/28/2020    BLOODU MODERATE 11/28/2020    GLUCOSEU Negative 11/28/2020     HgBA1c:    Lab Results   Component Value Date    LABA1C 5.6 10/30/2019     TSH:  No results found for: TSH  VITAMIN B12: No components found for: B12  FOLATE:    Lab Results   Component Value Date    FOLATE 16.0 03/10/2020     IRON:    Lab Results   Component Value Date    IRON 20 12/15/2020     Iron Saturation:  No components found for: PERCENTFE  TIBC:    Lab Results   Component Value Date    TIBC 129 12/15/2020       CXR:     Resident's Assessment and Plan     Rebeca Hall is a 70 y.o. female past medical history that includes a right pontine ischemic lacunar stroke in 6/2019 with minimal left hemiparesis and dysarthria, meningioma status post resection in 2002 associated with a focal onset seizure disorder. She presents to the emergency department 12/14 with chief complaint of abdominal pain and concerns for drainage around her umbilicus. Acute diverticulitis. -Patient history of diverticulosis presents with acute abdominal pain, has previous episode of diverticulitis last month treated with Flagyl and cefdinir.  -Today patient is afebrile, patient denies abdominal pain. WBC 9.8, abdominal wall abscess is clean, no signs of drainage.   -Continue Zosyn, add fluconazole per ID recommendation.  -Follow-up blood cultures, monitor vitals, CBC.  -Colonoscopy after clinical improve. Periumbilical abdominal wall abscess  -CT scan abdomen showing 6.1 x 4.4 cm abscess deep to the left anterior abdominal worries. Connected to abdominal wall via fistula formation.  -Fistula is wrapped with dressing, no drainage today. Patient is afebrile with no abdominal pain. -General surgery is following. Patient has schedule CT-guided drainage today. We will keep n.p.o.   -Currently on home Plavix and aspirin. If patient cannot get drainage today, we will resume diet. Thrombocytosis  -Platelets 566 today. Patient has chronic thrombocytosis seen November when patient began to have diverticulosis. -Peripheral blood smear shows reactive like thrombocytosis  -We will continue to follow daily CBC. Pancreatic head lesion  -CT scan abdomen showing 0.8 cm cystic pancreatic head lesion.   -CEA 2.2.  -Dr. Campbell Rowland is following patient.  -No intervention at this time. We will follow up imaging after 3 months. Seizures  -Home medication include Keppra and Lacmital  -Continue home medication. Monitor vitals. History of meningioma s/p resection. History of ischemic lacunar stroke. -Currently on home aspirin Plavix for procedures.  -Continue Lipitor. PT/OT evaluation:N/A  DVT prophylaxis/ GI prophylaxis: N/A  Disposition: current medical care    Linnea Josue MD, PGY-1  Attending physician: Dr. Ciro Garcia.

## 2020-12-16 NOTE — PROGRESS NOTES
GENERAL SURGERY  DAILY PROGRESS NOTE  12/16/2020    Subjective:  No acute events overnight  Plan for IR drainage today 12/16  Mildly worried about IR and the fact that she may need a PICC, but these issues were discussed with the patient   WBC is down trending     Objective:  /62   Pulse 97   Temp 99 °F (37.2 °C) (Temporal)   Resp 16   Ht 5' 2\" (1.575 m)   Wt 118 lb (53.5 kg)   LMP  (LMP Unknown)   SpO2 92%   BMI 21.58 kg/m²     GENERAL:  Laying in bed, awake, alert, cooperative, no apparent distress  HEAD: Normocephalic  EYES: No sclera icterus  LUNGS:  No increased work of breathing  CARDIOVASCULAR:  RR  ABDOMEN:  Soft, mildly tender, non-distended  EXTREMITIES: No edema or swelling      Assessment/Plan:  70 y.o. female with fistula formation from an intra-abdominal abscess    - Overall care per primary- NPO for possible procedure today  - IR for drainage of abscess  - PT-INR- 1.2, TEG- normal  - ID recs  - Discussed with Dr. Charissa Menchaca    Electronically signed by Salazar Arana DO on 12/16/2020 at 7:28 AM     Attending Note:    Patient seen/examined 12/16/2020. Agree with above assessment and plan. Perc drain per IR. Cont abx.

## 2020-12-16 NOTE — HOME CARE
PRICE QUOTE GIVEN TO: INTAKE  ORDERED THERAPY AT TIME OF QUOTE:  ZOSYN 3.375GM IV EVERY 8 HRS  COVERAGE: MEDICARE [employer]  TOTAL PT RESPONSIBILITY: $383.78 PER WEEK

## 2020-12-16 NOTE — PROGRESS NOTES
[facility]  Internal Medicine Residency Program  Progress Note - House Team 2    Patient:  John Duckworth 70 y.o. female MRN: [id number]     Date of Service: 12/16/2020     CC: Abdominal Pain  Overnight events: None   Hospital Day: 3    Subjective     Patient was seen and examined at bedside this morning. She was walking back to bed from the restroom. She denies any pain and states she feels good. She noted having diarrhea last night but otherwise has had normal BM. She denies any urinary symptoms, abdominal pain, fever, chills, night sweats, headache. Objective     Physical Exam:  Vitals: /62   Pulse 97   Temp 99 °F (37.2 °C) (Temporal)   Resp 16   Ht 5' 2\" (1.575 m)   Wt 118 lb (53.5 kg)   LMP  (LMP Unknown)   SpO2 92%   BMI 21.58 kg/m²     I & O - 24hr:     Intake/Output Summary (Last 24 hours) at 12/16/2020 0719  Last data filed at 12/15/2020 2125  Gross per 24 hour   Intake 360 ml   Output --   Net 360 ml       · General Appearance: alert, appears stated age and cooperative  · HEENT:  Head: Normocephalic, no lesions, without obvious abnormality. · Neck: no adenopathy, no carotid bruit, no JVD, supple, symmetrical, trachea midline and thyroid not enlarged, symmetric, no tenderness/mass/nodules  · Lung: clear to auscultation bilaterally  · Heart: regular rate and rhythm, S1, S2 normal, no murmur, click, rub or gallop  · Abdomen: soft, non-tender; bowel sounds normal; no masses,  no organomegaly  · Extremities:  extremities normal, atraumatic, no cyanosis or edema  · Musculokeletal: No joint swelling, no muscle tenderness. ROM normal in all joints of extremities.    · Neurologic: Mental status: Alert, oriented, thought content appropriate    Pertinent Labs & Imaging Studies     CBC with Differential:    Lab Results   Component Value Date    WBC 9.8 12/16/2020    RBC 3.50 12/16/2020    HGB 9.5 12/16/2020    HCT 30.6 12/16/2020     12/16/2020    MCV 87.4 12/16/2020 MCH 27.1 12/16/2020    MCHC 31.0 12/16/2020    RDW 14.3 12/16/2020    METASPCT 1.0 11/28/2020    LYMPHOPCT 8.9 12/15/2020    MONOPCT 4.9 12/15/2020    BASOPCT 0.4 12/15/2020    MONOSABS 0.53 12/15/2020    LYMPHSABS 0.96 12/15/2020    EOSABS 0.08 12/15/2020    BASOSABS 0.04 12/15/2020     CMP:    Lab Results   Component Value Date     12/16/2020    K 3.7 12/16/2020    K 3.9 12/14/2020     12/16/2020    CO2 27 12/16/2020    BUN 5 12/16/2020    CREATININE 0.6 12/16/2020    GFRAA >60 12/16/2020    LABGLOM >60 12/16/2020    GLUCOSE 95 12/16/2020    PROT 6.6 12/15/2020    LABALBU 2.8 12/15/2020    CALCIUM 8.5 12/16/2020    BILITOT 0.3 12/15/2020    ALKPHOS 105 12/15/2020    AST 18 12/15/2020    ALT 5 12/15/2020       Resident's Assessment and Plan     Mila Kimbrough is a 70 y.o. female arrived to the ED with abdominal pain and draining periumbilical wound.     1. Acute diverticulitis  ? Continue zosyn and diflucan  ? Monitor CBC, vitals, clinical symptoms for signs of progressing infection     2. Inferoanterior abscess with fistula   ? Hold aspirin and plavix  ? NPO  ? TEG MA - 83.8  ? IR plan for drainage today (12/16/2020)    3. Thrombocytosis - reactive vs pathological  · Platelets 982+ since diverticulitis dx  · Monitor levels for regression     4. S/P Meningioma Resection (2002)  ? Continue Keppra and Lamictal     5. Stroke (2019)  ?  Resume prophylactic aspirin and plavix      Autumn Provencal  MS3  Attending Physician: Dr. Latina Osgood

## 2020-12-16 NOTE — PROGRESS NOTES
Vascular Access Procedure Note    Procedure Date:   12/16/2020    Pre-procedure Verification/Time-Out:  The proposed risks versus benefits of this procedure were discussed in detail by the physician with patient. written consent was obtained from the patient. Relevant documentation was reviewed prior to procedure including signed consent form and medications. All necessary equipment for procedure is available at time of procedure yes. An audible time out was done at 1120AM by team members, correctly identifying patients name, medical record number, correct side, correct site, and correct procedure to be performed with registered nurse members of the procedure team all in agreement.         Indication for Procedure:   Reason for Insertion: intravenous antibiotics    ASA Assessment (Required for Moderate & Deep Sedation):      Procedure:   Reason for Consultation: power PICC line    Clinician Performing Procedure:   Deborah Puga rn    Assistant:  none    Sedation:   Analgesia Used: lidocaine 1%    Procedure Details/Findings:  Catheter Dominican Size: 5 fr dl  Lot Number: [id number]  Product #: [id number]  Expiration Date: 01/31/2021  Maximum Barrier Precautions: cap, eye shield, full body drape, gloves, gown, handwashing and mask  Skin Prep: chlorhexidine  Technique: modified seldinger and ultrasound guided with VPS  Attempts: 1  Exposed (cm): 1  Total (cm): 37  Placement Site: right brachial vein  Vessel Size: 0.50  Dressing: securement device, transparent dressing and biopatch  Blood Return: Yes   Ultrasound Guidance: Yes   Arm Circumference Mid-Bicep (cm): 24  Chest X-Ray Ordered: VasoNova VPS  End Placement: caj    Complications:   none     Post-operative Condition:  stable  Patient Tolerated Procedure: well     Comments/Post-operative Education:   Post Procedure Interventions: patient verbalized understanding of education    Marco Lin  12/16/20  11:57 AM

## 2020-12-16 NOTE — PROGRESS NOTES
[address] [place] [address] [facility]  [facility]  Progress Note    SUBJECTIVE:  Chief Complaint   Patient presents with    Abdominal Pain     Sent in for abdominal wound check and diverticulitis. Patient is tolerating medications. No reported adverse drug reactions. No nausea, vomiting, diarrhea. Afebrile. Room air. PICC line placed today. Review of systems:  As stated above in the chief complaint, otherwise negative. Medications:  Scheduled Meds:   sodium chloride flush  10 mL Intravenous 2 times per day    [Held by provider] enoxaparin  40 mg Subcutaneous Daily    [Held by provider] aspirin  81 mg Oral Daily    lamoTRIgine  100 mg Oral BID    levETIRAcetam  500 mg Oral BID    atorvastatin  40 mg Oral Nightly    folic acid  1 mg Oral Daily    [Held by provider] clopidogrel  75 mg Oral Daily    piperacillin-tazobactam  3.375 g Intravenous Q8H    And    sodium chloride  25 mL Intravenous Q8H    fluconazole  400 mg Oral Daily    lidocaine 1 % injection  5 mL Intradermal Once    sodium chloride flush  10 mL Intravenous 2 times per day    lactobacillus  1 capsule Oral Daily     Continuous Infusions:  PRN Meds:sodium chloride flush, promethazine **OR** ondansetron, polyethylene glycol, acetaminophen **OR** acetaminophen, sodium chloride flush    OBJECTIVE:  /60   Pulse 88   Temp 98.3 °F (36.8 °C) (Temporal)   Resp 16   Ht 5' 2\" (1.575 m)   Wt 118 lb (53.5 kg)   LMP  (LMP Unknown)   SpO2 95%   BMI 21.58 kg/m²   Temp  Av.7 °F (37.1 °C)  Min: 98.3 °F (36.8 °C)  Max: 99 °F (37.2 °C)  Constitutional: The patient is awake, alert, and oriented. Skin: Warm and dry. No rashes were noted. HEENT: Round and reactive pupils. Moist mucous membranes. No ulcerations or thrush. Neck: Supple to movements. Chest: No use of accessory muscles to breathe. Symmetrical expansion. No wheezing, crackles or rhonchi. Cardiovascular: S1 and S2 are rhythmic and regular.  No murmurs appreciated. Abdomen: Positive bowel sounds to auscultation. Benign to palpation. No masses felt. No hepatosplenomegaly. Extremities: No clubbing, no cyanosis, no edema. Lines: peripheral  PICC line right arm 12/16/2020     Laboratory and Tests Review:  Lab Results   Component Value Date    WBC 9.8 12/16/2020    WBC 10.7 12/15/2020    WBC 10.9 12/15/2020    HGB 9.5 (L) 12/16/2020    HCT 30.6 (L) 12/16/2020    MCV 87.4 12/16/2020     (H) 12/16/2020     Lab Results   Component Value Date    NEUTROABS 9.03 (H) 12/15/2020    NEUTROABS 6.95 12/14/2020    NEUTROABS 10.33 (H) 11/28/2020     No results found for: CRPHS  Lab Results   Component Value Date    ALT 5 12/15/2020    AST 18 12/15/2020    GGT 24 01/09/2020    ALKPHOS 105 (H) 12/15/2020    BILITOT 0.3 12/15/2020     Lab Results   Component Value Date     12/16/2020    K 3.7 12/16/2020    K 3.9 12/14/2020     12/16/2020    CO2 27 12/16/2020    BUN 5 12/16/2020    CREATININE 0.6 12/16/2020    CREATININE 0.8 12/15/2020    CREATININE 0.6 12/14/2020    GFRAA >60 12/16/2020    LABGLOM >60 12/16/2020    GLUCOSE 95 12/16/2020    PROT 6.6 12/15/2020    LABALBU 2.8 12/15/2020    CALCIUM 8.5 12/16/2020    BILITOT 0.3 12/15/2020    ALKPHOS 105 12/15/2020    AST 18 12/15/2020    ALT 5 12/15/2020     No results found for: CRP  No results found for: Nabor Ramirez  Radiology:      Microbiology:   Lab Results   Component Value Date    BC 24 Hours no growth 12/15/2020     Lab Results   Component Value Date    BLOODCULT2 24 Hours no growth 12/15/2020     No results found for: WNDABS  No results found for: RESPSMEAR  No results found for: MPNEUMO, CLAMYDCU, LABLEGI, AFBCX, FUNGSM, LABFUNG  No results found for: CULTRESP  No results found for: CXCATHTIP  No results found for: BFCS  No results found for: CXSURG  No results found for: LABURIN  No results found for: [facility]  No results for input(s): PROCAL in the last 72 hours.     ASSESSMENT:  · Intra-abdominal abscess  · Cystic lesions at the head of the pancreas  · Diverticulitis partially treated    PLAN:  · Continue zosyn & fluconazole  · IR for drainage of abscess - unable to be done until Friday r/t patient being on plavix. · PICC line   · Check final cultures  · Monitor labs    Starr Calero  2:36 PM  12/16/2020     Pt seen and examined. Above discussed agree with advanced practice nurse. Labs, cultures, and radiographs reviewed. Face to Face encounter occurred. Changes made as necessary.      Jalyn Everett MD

## 2020-12-17 LAB
ANION GAP SERPL CALCULATED.3IONS-SCNC: 6 MMOL/L (ref 7–16)
APTT: 24 SEC (ref 24.5–35.1)
BUN BLDV-MCNC: 6 MG/DL (ref 8–23)
CA 19-9: 7 U/ML (ref 0–37)
CALCIUM SERPL-MCNC: 8.8 MG/DL (ref 8.6–10.2)
CHLORIDE BLD-SCNC: 100 MMOL/L (ref 98–107)
CO2: 28 MMOL/L (ref 22–29)
CREAT SERPL-MCNC: 0.6 MG/DL (ref 0.5–1)
GFR AFRICAN AMERICAN: >60
GFR NON-AFRICAN AMERICAN: >60 ML/MIN/1.73
GLUCOSE BLD-MCNC: 97 MG/DL (ref 74–99)
HCT VFR BLD CALC: 31.9 % (ref 34–48)
HEMOGLOBIN: 10.2 G/DL (ref 11.5–15.5)
KEPPRA: 11 UG/ML (ref 12–46)
LAMOTRIGINE LEVEL: 11.3 UG/ML (ref 2.5–15)
MCH RBC QN AUTO: 27.8 PG (ref 26–35)
MCHC RBC AUTO-ENTMCNC: 32 % (ref 32–34.5)
MCV RBC AUTO: 86.9 FL (ref 80–99.9)
PDW BLD-RTO: 14.3 FL (ref 11.5–15)
PLATELET # BLD: 742 E9/L (ref 130–450)
PMV BLD AUTO: 8.3 FL (ref 7–12)
POTASSIUM SERPL-SCNC: 3.4 MMOL/L (ref 3.5–5)
RBC # BLD: 3.67 E12/L (ref 3.5–5.5)
SODIUM BLD-SCNC: 134 MMOL/L (ref 132–146)
WBC # BLD: 10.5 E9/L (ref 4.5–11.5)

## 2020-12-17 PROCEDURE — 6370000000 HC RX 637 (ALT 250 FOR IP): Performed by: SPECIALIST

## 2020-12-17 PROCEDURE — 1200000000 HC SEMI PRIVATE

## 2020-12-17 PROCEDURE — 6360000002 HC RX W HCPCS: Performed by: EMERGENCY MEDICINE

## 2020-12-17 PROCEDURE — 99231 SBSQ HOSP IP/OBS SF/LOW 25: CPT | Performed by: INTERNAL MEDICINE

## 2020-12-17 PROCEDURE — 6370000000 HC RX 637 (ALT 250 FOR IP): Performed by: INTERNAL MEDICINE

## 2020-12-17 PROCEDURE — 85027 COMPLETE CBC AUTOMATED: CPT

## 2020-12-17 PROCEDURE — 2580000003 HC RX 258: Performed by: INTERNAL MEDICINE

## 2020-12-17 PROCEDURE — 85730 THROMBOPLASTIN TIME PARTIAL: CPT

## 2020-12-17 PROCEDURE — 80048 BASIC METABOLIC PNL TOTAL CA: CPT

## 2020-12-17 PROCEDURE — 2580000003 HC RX 258: Performed by: EMERGENCY MEDICINE

## 2020-12-17 PROCEDURE — 2580000003 HC RX 258: Performed by: SPECIALIST

## 2020-12-17 PROCEDURE — 36415 COLL VENOUS BLD VENIPUNCTURE: CPT

## 2020-12-17 RX ORDER — POTASSIUM CHLORIDE 20 MEQ/1
20 TABLET, EXTENDED RELEASE ORAL ONCE
Status: COMPLETED | OUTPATIENT
Start: 2020-12-17 | End: 2020-12-17

## 2020-12-17 RX ADMIN — FLUCONAZOLE 400 MG: 100 TABLET ORAL at 09:47

## 2020-12-17 RX ADMIN — SODIUM CHLORIDE 25 ML: 9 INJECTION, SOLUTION INTRAVENOUS at 21:03

## 2020-12-17 RX ADMIN — PIPERACILLIN AND TAZOBACTAM 3.38 G: 3; .375 INJECTION, POWDER, LYOPHILIZED, FOR SOLUTION INTRAVENOUS at 17:20

## 2020-12-17 RX ADMIN — LEVETIRACETAM 500 MG: 500 TABLET ORAL at 21:02

## 2020-12-17 RX ADMIN — POTASSIUM CHLORIDE 20 MEQ: 1500 TABLET, EXTENDED RELEASE ORAL at 09:47

## 2020-12-17 RX ADMIN — LEVETIRACETAM 500 MG: 500 TABLET ORAL at 09:47

## 2020-12-17 RX ADMIN — Medication 10 ML: at 09:50

## 2020-12-17 RX ADMIN — ATORVASTATIN CALCIUM 40 MG: 40 TABLET, FILM COATED ORAL at 21:02

## 2020-12-17 RX ADMIN — SODIUM CHLORIDE, PRESERVATIVE FREE 10 ML: 5 INJECTION INTRAVENOUS at 09:50

## 2020-12-17 RX ADMIN — LAMOTRIGINE 100 MG: 100 TABLET ORAL at 21:02

## 2020-12-17 RX ADMIN — Medication 1 CAPSULE: at 09:47

## 2020-12-17 RX ADMIN — FOLIC ACID 1 MG: 1 TABLET ORAL at 09:47

## 2020-12-17 RX ADMIN — LAMOTRIGINE 100 MG: 100 TABLET ORAL at 09:47

## 2020-12-17 RX ADMIN — PIPERACILLIN AND TAZOBACTAM 3.38 G: 3; .375 INJECTION, POWDER, LYOPHILIZED, FOR SOLUTION INTRAVENOUS at 01:16

## 2020-12-17 RX ADMIN — PIPERACILLIN AND TAZOBACTAM 3.38 G: 3; .375 INJECTION, POWDER, LYOPHILIZED, FOR SOLUTION INTRAVENOUS at 09:46

## 2020-12-17 RX ADMIN — Medication 10 ML: at 21:02

## 2020-12-17 ASSESSMENT — PAIN SCALES - GENERAL
PAINLEVEL_OUTOF10: 0

## 2020-12-17 NOTE — PROGRESS NOTES
GENERAL SURGERY  DAILY PROGRESS NOTE  12/17/2020    Subjective:  No acute events overnight  Plan for IR drainage Friday 12/18  IR unwilling to place drain until off plavix for 5 days despite normal/ hypercoagulable TEG, normal INR/ PTT  WBC is stable   Handling her diet  She has no other issue this morning      Objective:  /69   Pulse 77   Temp 98.2 °F (36.8 °C) (Temporal)   Resp 16   Ht 5' 2\" (1.575 m)   Wt 118 lb (53.5 kg)   LMP  (LMP Unknown)   SpO2 93%   BMI 21.58 kg/m²     GENERAL:  Laying in bed, awake, cooperative, no apparent distress  HEAD: Normocephalic  LUNGS:  No increased work of breathing  CARDIOVASCULAR:  RR  ABDOMEN:  Soft, mildly tender, non-distended  EXTREMITIES: No edema or swelling      Assessment/Plan:  70 y.o. 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 Leatha Garcia    Electronically signed by Kari Lomas DO on 12/17/2020 at 6:38 AM

## 2020-12-17 NOTE — CARE COORDINATION
Discharge plan is to return home when medically stable. For IR procedure tomorrow 12/18. IV antibiotics pending final cultures.  Ning Dill RN

## 2020-12-17 NOTE — PLAN OF CARE
Problem: Falls - Risk of:  Goal: Will remain free from falls  Description: Will remain free from falls  12/17/2020 1324 by Ana Alberts  Outcome: Met This Shift  12/17/2020 0049 by Daylin Harris  Outcome: Met This Shift  Goal: Absence of physical injury  Description: Absence of physical injury  Outcome: Met This Shift     Problem: Skin Integrity:  Goal: Will show no infection signs and symptoms  Description: Will show no infection signs and symptoms  Outcome: Met This Shift  Goal: Absence of new skin breakdown  Description: Absence of new skin breakdown  Outcome: Met This Shift

## 2020-12-17 NOTE — PLAN OF CARE
Problem: Falls - Risk of:  Goal: Will remain free from falls  Description: Will remain free from falls  12/17/2020 1800 by Medina Fat  Outcome: Met This Shift  12/17/2020 1324 by Medina Fat  Outcome: Met This Shift  Goal: Absence of physical injury  Description: Absence of physical injury  12/17/2020 1800 by Medina Fat  Outcome: Met This Shift  12/17/2020 1324 by Medina Fat  Outcome: Met This Shift     Problem: Skin Integrity:  Goal: Will show no infection signs and symptoms  Description: Will show no infection signs and symptoms  Outcome: Met This Shift  Goal: Absence of new skin breakdown  Description: Absence of new skin breakdown  12/17/2020 1800 by Medina Fat  Outcome: Met This Shift  12/17/2020 1324 by Medina Fat  Outcome: Met This Shift

## 2020-12-17 NOTE — PROGRESS NOTES
[facility]  Internal [facility] Residency Program  Progress Note - House Team 1    Patient:  Daryl Levy 70 y.o. female MRN: [id number]     Date of Service: 12/17/2020     CC: abdominal pain  Overnight events: No acute events overnight    Subjective     Patient is alert, awake, oriented x3. Patient stated she is moving around frequently yesterday and she has mild abdominal tenderness, but patient denies any abdominal pain. Patient has not had any bowel movement since yesterday. She is still passing gas. Fistula site is dry and clean, no drainage    Objective     Physical Exam:  · Vitals: /86   Pulse 81   Temp 97.5 °F (36.4 °C) (Temporal)   Resp 16   Ht 5' 2\" (1.575 m)   Wt 118 lb (53.5 kg)   LMP  (LMP Unknown)   SpO2 98%   BMI 21.58 kg/m²     · I & O - 24hr: No intake/output data recorded. · General Appearance: alert, appears stated age and cooperative  · HEENT:  Head: Normocephalic, no lesions, without obvious abnormality. · Eye: Normal external eye, conjunctiva, lids cornea, ANIYAH. · Ears: Normal TM's bilaterally. Normal auditory canals and external ears. Non-tender. 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 · Extremities:  extremities normal, atraumatic, no cyanosis or edema  · Musculokeletal: No joint swelling, no muscle tenderness. ROM normal in all joints of extremities.    · Neurologic: Mental status: Alert, oriented, thought content appropriate  Subject  Pertinent Labs & Imaging Studies   crow  CBC with Differential:    Lab Results   Component Value Date    WBC 10.5 12/17/2020    RBC 3.67 12/17/2020 Saturation:  No components found for: PERCENTFE  TIBC:    Lab Results   Component Value Date    TIBC 129 12/15/2020       CXR:     Resident's Assessment and Plan     Daryl Levy is a 70 y.o. female past medical history that includes a right pontine ischemic lacunar stroke in 6/2019 with minimal left hemiparesis and dysarthria, meningioma status post resection in 2002 associated with a focal onset seizure disorder. She presents to the emergency department 12/14 with chief complaint of abdominal pain and concerns for drainage around her umbilicus. Acute diverticulitis, resolving  -Patient history of diverticulosis presents with acute abdominal pain, has previous episode of diverticulitis last month treated with Flagyl and cefdinir.  -Today patient is afebrile, patient denies abdominal pain but states some tenderness. WBC 10.5, abdominal wall fistula is clean, no signs of drainage.   -Continue Zosyn, add fluconazole per ID recommendation.  -Follow-up blood cultures, monitor vitals, CBC.  -Colonoscopy after clinical improve. Periumbilical abdominal wall abscess  -CT scan abdomen showing 6.1 x 4.4 cm abscess deep to the left anterior abdominal worries. Connected to abdominal wall via fistula formation.  -Fistula is wrapped with dressing, no drainage today. Patient is afebrile with no abdominal pain. -General surgery is following. Patient has schedule CT-guided drainage on Friday after holding Plavix for 5 days. -PT 13.1, INR 1.2  -Currently on home Plavix and aspirin. If patient cannot get drainage today, we will resume diet. Thrombocytosis  -Platelets 865 today. Patient has chronic thrombocytosis seen November when patient began to have diverticulosis. -Peripheral blood smear shows reactive-like thrombocytosis  -We will continue to follow daily CBC. Pancreatic head lesion  -CT scan abdomen showing 0.8 cm cystic pancreatic head lesion.   -CEA 2.2.  -Dr. Tylor Sanz is following patient.  -No

## 2020-12-17 NOTE — PROGRESS NOTES
[address] [place] [address] [facility]  [facility]  Progress Note    SUBJECTIVE:  Chief Complaint   Patient presents with    Abdominal Pain     Sent in for abdominal wound check and diverticulitis. Patient is tolerating medications. No reported adverse drug reactions. No nausea, vomiting, diarrhea. Afebrile. Room air. Denies abdominal pain. No much of an appetite but still eats. Review of systems:  As stated above in the chief complaint, otherwise negative. Medications:  Scheduled Meds:   sodium chloride flush  10 mL Intravenous 2 times per day    [Held by provider] enoxaparin  40 mg Subcutaneous Daily    [Held by provider] aspirin  81 mg Oral Daily    lamoTRIgine  100 mg Oral BID    levETIRAcetam  500 mg Oral BID    atorvastatin  40 mg Oral Nightly    folic acid  1 mg Oral Daily    [Held by provider] clopidogrel  75 mg Oral Daily    piperacillin-tazobactam  3.375 g Intravenous Q8H    And    sodium chloride  25 mL Intravenous Q8H    fluconazole  400 mg Oral Daily    lidocaine 1 % injection  5 mL Intradermal Once    sodium chloride flush  10 mL Intravenous 2 times per day    lactobacillus  1 capsule Oral Daily     Continuous Infusions:  PRN Meds:sodium chloride flush, promethazine **OR** ondansetron, polyethylene glycol, acetaminophen **OR** acetaminophen, sodium chloride flush    OBJECTIVE:  /86   Pulse 81   Temp 97.5 °F (36.4 °C) (Temporal)   Resp 16   Ht 5' 2\" (1.575 m)   Wt 118 lb (53.5 kg)   LMP  (LMP Unknown)   SpO2 98%   BMI 21.58 kg/m²   Temp  Av.7 °F (36.5 °C)  Min: 97.5 °F (36.4 °C)  Max: 98.2 °F (36.8 °C)  Constitutional: The patient is awake, alert, and oriented. Skin: Warm and dry. No rashes were noted. HEENT: Round and reactive pupils. Moist mucous membranes. No ulcerations or thrush. Neck: Supple to movements. Chest: No use of accessory muscles to breathe. Symmetrical expansion. No wheezing, crackles or rhonchi.   Cardiovascular: S1 and S2 are hours.    ASSESSMENT:  · Intra-abdominal abscess  · Cystic lesions at the head of the pancreas  · Diverticulitis partially treated    PLAN:  · Continue zosyn & fluconazole  · IR for drainage of abscess - unable to be done until Friday r/t patient being on plavix. · PICC line   · Check final cultures  · Monitor labs  Electronically signed by MAYANK Weston CNP on 12/17/2020 at 10:51 AM  Pt seen and examined. Above discussed agree with advanced practice nurse. Labs, cultures, and radiographs reviewed. Face to Face encounter occurred. Changes made as necessary.      Rubin Ferraro MD

## 2020-12-17 NOTE — PLAN OF CARE
Problem: Falls - Risk of:  Goal: Will remain free from falls  Description: Will remain free from falls  12/17/2020 0049 by Eladia Griffith  Outcome: Met This Shift

## 2020-12-18 ENCOUNTER — APPOINTMENT (OUTPATIENT)
Dept: CT IMAGING | Age: 75
DRG: 391 | End: 2020-12-18
Payer: MEDICARE

## 2020-12-18 DIAGNOSIS — Z01.818 PRE-OP EXAM: Primary | ICD-10-CM

## 2020-12-18 LAB
ANION GAP SERPL CALCULATED.3IONS-SCNC: 7 MMOL/L (ref 7–16)
APTT: 25.9 SEC (ref 24.5–35.1)
BUN BLDV-MCNC: 6 MG/DL (ref 8–23)
CALCIUM SERPL-MCNC: 8.9 MG/DL (ref 8.6–10.2)
CHLORIDE BLD-SCNC: 102 MMOL/L (ref 98–107)
CO2: 29 MMOL/L (ref 22–29)
CREAT SERPL-MCNC: 0.6 MG/DL (ref 0.5–1)
GFR AFRICAN AMERICAN: >60
GFR NON-AFRICAN AMERICAN: >60 ML/MIN/1.73
GLUCOSE BLD-MCNC: 107 MG/DL (ref 74–99)
HCT VFR BLD CALC: 29.7 % (ref 34–48)
HEMOGLOBIN: 9.5 G/DL (ref 11.5–15.5)
INR BLD: 1.2
MCH RBC QN AUTO: 27.5 PG (ref 26–35)
MCHC RBC AUTO-ENTMCNC: 32 % (ref 32–34.5)
MCV RBC AUTO: 86.1 FL (ref 80–99.9)
PDW BLD-RTO: 14.4 FL (ref 11.5–15)
PLATELET # BLD: 717 E9/L (ref 130–450)
PMV BLD AUTO: 8.3 FL (ref 7–12)
POTASSIUM SERPL-SCNC: 3.6 MMOL/L (ref 3.5–5)
PROTHROMBIN TIME: 13 SEC (ref 9.3–12.4)
RBC # BLD: 3.45 E12/L (ref 3.5–5.5)
SODIUM BLD-SCNC: 138 MMOL/L (ref 132–146)
WBC # BLD: 10.7 E9/L (ref 4.5–11.5)

## 2020-12-18 PROCEDURE — 99231 SBSQ HOSP IP/OBS SF/LOW 25: CPT | Performed by: INTERNAL MEDICINE

## 2020-12-18 PROCEDURE — 2500000003 HC RX 250 WO HCPCS: Performed by: RADIOLOGY

## 2020-12-18 PROCEDURE — 85730 THROMBOPLASTIN TIME PARTIAL: CPT

## 2020-12-18 PROCEDURE — 2709999900 CT ABSCESS DRAINAGE W CATH PLACEMENT S&I

## 2020-12-18 PROCEDURE — 75989 ABSCESS DRAINAGE UNDER X-RAY: CPT | Performed by: RADIOLOGY

## 2020-12-18 PROCEDURE — 6370000000 HC RX 637 (ALT 250 FOR IP): Performed by: INTERNAL MEDICINE

## 2020-12-18 PROCEDURE — 6360000002 HC RX W HCPCS: Performed by: EMERGENCY MEDICINE

## 2020-12-18 PROCEDURE — 6360000002 HC RX W HCPCS: Performed by: RADIOLOGY

## 2020-12-18 PROCEDURE — 1200000000 HC SEMI PRIVATE

## 2020-12-18 PROCEDURE — 2580000003 HC RX 258: Performed by: EMERGENCY MEDICINE

## 2020-12-18 PROCEDURE — 87205 SMEAR GRAM STAIN: CPT

## 2020-12-18 PROCEDURE — 0W9G30Z DRAINAGE OF PERITONEAL CAVITY WITH DRAINAGE DEVICE, PERCUTANEOUS APPROACH: ICD-10-PCS | Performed by: RADIOLOGY

## 2020-12-18 PROCEDURE — 80048 BASIC METABOLIC PNL TOTAL CA: CPT

## 2020-12-18 PROCEDURE — 87077 CULTURE AEROBIC IDENTIFY: CPT

## 2020-12-18 PROCEDURE — 85610 PROTHROMBIN TIME: CPT

## 2020-12-18 PROCEDURE — 85027 COMPLETE CBC AUTOMATED: CPT

## 2020-12-18 PROCEDURE — 87075 CULTR BACTERIA EXCEPT BLOOD: CPT

## 2020-12-18 PROCEDURE — 87186 SC STD MICRODIL/AGAR DIL: CPT

## 2020-12-18 PROCEDURE — 2580000003 HC RX 258: Performed by: SPECIALIST

## 2020-12-18 PROCEDURE — 87070 CULTURE OTHR SPECIMN AEROBIC: CPT

## 2020-12-18 PROCEDURE — 36415 COLL VENOUS BLD VENIPUNCTURE: CPT

## 2020-12-18 PROCEDURE — 6370000000 HC RX 637 (ALT 250 FOR IP): Performed by: SPECIALIST

## 2020-12-18 PROCEDURE — 2580000003 HC RX 258: Performed by: INTERNAL MEDICINE

## 2020-12-18 RX ORDER — IBUPROFEN 400 MG/1
400 TABLET ORAL EVERY 6 HOURS PRN
Status: DISCONTINUED | OUTPATIENT
Start: 2020-12-18 | End: 2020-12-19

## 2020-12-18 RX ORDER — LIDOCAINE HYDROCHLORIDE 20 MG/ML
INJECTION, SOLUTION INFILTRATION; PERINEURAL
Status: COMPLETED | OUTPATIENT
Start: 2020-12-18 | End: 2020-12-18

## 2020-12-18 RX ORDER — MIDAZOLAM HYDROCHLORIDE 1 MG/ML
INJECTION INTRAMUSCULAR; INTRAVENOUS
Status: COMPLETED | OUTPATIENT
Start: 2020-12-18 | End: 2020-12-18

## 2020-12-18 RX ORDER — FENTANYL CITRATE 50 UG/ML
INJECTION, SOLUTION INTRAMUSCULAR; INTRAVENOUS
Status: COMPLETED | OUTPATIENT
Start: 2020-12-18 | End: 2020-12-18

## 2020-12-18 RX ORDER — FLUCONAZOLE 200 MG/1
400 TABLET ORAL DAILY
Qty: 42 TABLET | Refills: 0 | Status: SHIPPED | OUTPATIENT
Start: 2020-12-19 | End: 2021-01-09

## 2020-12-18 RX ADMIN — ATORVASTATIN CALCIUM 40 MG: 40 TABLET, FILM COATED ORAL at 20:26

## 2020-12-18 RX ADMIN — LEVETIRACETAM 500 MG: 500 TABLET ORAL at 08:20

## 2020-12-18 RX ADMIN — FOLIC ACID 1 MG: 1 TABLET ORAL at 08:20

## 2020-12-18 RX ADMIN — PIPERACILLIN AND TAZOBACTAM 3.38 G: 3; .375 INJECTION, POWDER, LYOPHILIZED, FOR SOLUTION INTRAVENOUS at 12:01

## 2020-12-18 RX ADMIN — LAMOTRIGINE 100 MG: 100 TABLET ORAL at 20:26

## 2020-12-18 RX ADMIN — FLUCONAZOLE 400 MG: 100 TABLET ORAL at 08:20

## 2020-12-18 RX ADMIN — Medication 10 ML: at 08:26

## 2020-12-18 RX ADMIN — LAMOTRIGINE 100 MG: 100 TABLET ORAL at 08:20

## 2020-12-18 RX ADMIN — IBUPROFEN 400 MG: 400 TABLET, FILM COATED ORAL at 16:44

## 2020-12-18 RX ADMIN — Medication 1 CAPSULE: at 08:20

## 2020-12-18 RX ADMIN — SODIUM CHLORIDE, PRESERVATIVE FREE 10 ML: 5 INJECTION INTRAVENOUS at 08:25

## 2020-12-18 RX ADMIN — Medication 10 ML: at 20:26

## 2020-12-18 RX ADMIN — PIPERACILLIN AND TAZOBACTAM 3.38 G: 3; .375 INJECTION, POWDER, LYOPHILIZED, FOR SOLUTION INTRAVENOUS at 03:06

## 2020-12-18 RX ADMIN — LEVETIRACETAM 500 MG: 500 TABLET ORAL at 20:26

## 2020-12-18 RX ADMIN — MIDAZOLAM 1 MG: 1 INJECTION INTRAMUSCULAR; INTRAVENOUS at 11:15

## 2020-12-18 RX ADMIN — LIDOCAINE HYDROCHLORIDE 8 ML: 20 INJECTION, SOLUTION INFILTRATION; PERINEURAL at 11:19

## 2020-12-18 RX ADMIN — PIPERACILLIN AND TAZOBACTAM 3.38 G: 3; .375 INJECTION, POWDER, LYOPHILIZED, FOR SOLUTION INTRAVENOUS at 20:26

## 2020-12-18 RX ADMIN — FENTANYL CITRATE 50 MCG: 50 INJECTION, SOLUTION INTRAMUSCULAR; INTRAVENOUS at 11:15

## 2020-12-18 ASSESSMENT — PAIN SCALES - GENERAL
PAINLEVEL_OUTOF10: 3
PAINLEVEL_OUTOF10: 0

## 2020-12-18 NOTE — PLAN OF CARE
Problem: Falls - Risk of:  Goal: Will remain free from falls  Description: Will remain free from falls  12/18/2020 1415 by Rhona Werner RN  Outcome: Met This Shift     Problem: Falls - Risk of:  Goal: Absence of physical injury  Description: Absence of physical injury  12/18/2020 1415 by Rhona Werner RN  Outcome: Met This Shift     Problem: Skin Integrity:  Goal: Will show no infection signs and symptoms  Description: Will show no infection signs and symptoms  Outcome: Met This Shift     Problem: Skin Integrity:  Goal: Absence of new skin breakdown  Description: Absence of new skin breakdown  12/18/2020 1415 by Rhona Werner RN  Outcome: Met This Shift

## 2020-12-18 NOTE — POST SEDATION
POST SEDATION NOTE:  Time: 11:10 AM    Cardiopulmonary: Vitals Signs Stable: yes    Level of Consciousness: alert    Reversal Agent Used: No    Complications: none    Follow-up/Observations: none    Pain Score: 1    Schuyler Calero MD

## 2020-12-18 NOTE — PROGRESS NOTES
[address] [place] [address] [facility]  [facility]  Progress Note    SUBJECTIVE:  Chief Complaint   Patient presents with    Abdominal Pain     Sent in for abdominal wound check and diverticulitis. Patient is tolerating medications. No reported adverse drug reactions. No nausea, vomiting, diarrhea. Afebrile. Room air. Denies abdominal pain. No much of an appetite but still eats. Drain placed today. Review of systems:  As stated above in the chief complaint, otherwise negative. Medications:  Scheduled Meds:   sodium chloride flush  10 mL Intravenous 2 times per day    [Held by provider] enoxaparin  40 mg Subcutaneous Daily    [Held by provider] aspirin  81 mg Oral Daily    lamoTRIgine  100 mg Oral BID    levETIRAcetam  500 mg Oral BID    atorvastatin  40 mg Oral Nightly    folic acid  1 mg Oral Daily    [Held by provider] clopidogrel  75 mg Oral Daily    piperacillin-tazobactam  3.375 g Intravenous Q8H    And    sodium chloride  25 mL Intravenous Q8H    fluconazole  400 mg Oral Daily    lidocaine 1 % injection  5 mL Intradermal Once    sodium chloride flush  10 mL Intravenous 2 times per day    lactobacillus  1 capsule Oral Daily     Continuous Infusions:  PRN Meds:sodium chloride flush, promethazine **OR** ondansetron, polyethylene glycol, acetaminophen **OR** acetaminophen, sodium chloride flush    OBJECTIVE:  /80   Pulse 73   Temp 98.2 °F (36.8 °C) (Oral)   Resp 18   Ht 5' 2\" (1.575 m)   Wt 118 lb (53.5 kg)   LMP  (LMP Unknown)   SpO2 97%   BMI 21.58 kg/m²   Temp  Av °F (36.7 °C)  Min: 97.8 °F (36.6 °C)  Max: 98.2 °F (36.8 °C)  Constitutional: The patient is awake, alert, and oriented. Skin: Warm and dry. No rashes were noted. HEENT:  Moist mucous membranes. No ulcerations or thrush. Neck: Supple to movements. Chest: No use of accessory muscles to breathe. Symmetrical expansion. No wheezing, crackles or rhonchi.   Cardiovascular: S1 and S2 are rhythmic and in the last 72 hours. ASSESSMENT:  · Intra-abdominal abscess  · Cystic lesions at the head of the pancreas  · Diverticulitis partially treated    PLAN:  · Continue zosyn & fluconazole  · IR for drainage of abscess - drain placed today-follow fluid culture  · PICC line   · Check final cultures  · Monitor labs  · Med rec done  Electronically signed by MAYANK Isabel CNP on 12/18/2020 at 12:21 PM  Pt seen and examined. Above discussed agree with advanced practice nurse. Labs, cultures, and radiographs reviewed. Face to Face encounter occurred. Changes made as necessary.      Bruce Barnard MD

## 2020-12-18 NOTE — PROGRESS NOTES
[facility]  Internal Medicine Residency / [address]    Attending Physician Statement  I have discussed the case, including pertinent history and exam findings with the resident and the team.  I have seen and examined the patient and the key elements of the encounter have been performed by me. I have also personally reviewed imaging studies and labs. I agree with the assessment, plan and orders as documented by the resident. Seen today after IR-guided abscess drainage - purulent material drained. Tolerated the procedure well but has some pain at the site of the drainage. She remains on zosyn and fluconazole, she has a PICC line. Assessment:  1. Abdominal wall abscess s/p IR guided drainage. Currently with drain in the LLQ. 2. Diverticulitis, improving  3. Thrombocytosis, improving. This is likely reactive as the rise coincides with her recent infections. PBS reviewed. 4. Incidental finding of pancreatic head cystic lesion - evaluated by Dr Tom Germain team yesterday, no need to go to scheduled follow up this week, recommend follow up imaging in 3 months  5. CVA 2019  6. S/p resection of meningioma  7. Seizures, on keppra and lamictal        Plan:  S/p IR drainage - follow up cultures, repeat CT for follow up  Ibuprofen prn   Will restart DAPT tomorrow if stable and okay with IR  Possible discharge in 24 - 48 hours      Remainder of medical problems as per resident note. Chris Robertson MD  Internal Medicine Residency [facility]

## 2020-12-18 NOTE — PRE SEDATION
Jordon Harden II, MD  12/18/2020  11:10 AM        PRE-SEDATION PHYSICIAN ASSESSMENT:      1. HISTORY & PHYSICAL EXAMINATION:  Comments: none    Vitals:    12/18/20 1107   BP: 122/71   Pulse: 77   Resp: 20   Temp:    SpO2: 99%       Allergies: Patient has no known allergies. 2. Heart and Lungs immediately prior to procedure demonstrate no contraindications to proceed      Chief Complaint: <principal problem not specified>    Drug: unknown  Tobacco: unknown    3. PAST ANESTHESIA EXPERIENCE:  unknown. 4. AIRWAY/TEETH/HEAD & NECK(Mallampati Classification):  II (soft palate, uvula, fauces visible)    5: NORMAL RANGE OF MOTION OF NECK: No    6. PATIENT WILL LIKELY TOLERATE PLAN OF MODERATE SEDATION    7. ASA 2.     Devin Christina MD

## 2020-12-18 NOTE — CARE COORDINATION
12/18/2020 social work transition of care planning  Sw followed up with pt about cost of iv atb ($383.78/week), pt referred sw to her son or dil. Sw spoke with dil and they agreed to pricing and dil stated that she knows how to administer iv atb. [facility] unable to see tonight, they will check when they can see pt tomorrow. Family to transport at discharge. Cm faxed rx to x [phone].    Electronically signed by TABBY Cifuentes on 12/18/2020 at 2:57 PM

## 2020-12-18 NOTE — PROGRESS NOTES
[facility]  Internal Medicine Residency Program  Progress Note - House Team 1    Patient:  Sal Shepard 70 y.o. female MRN: [id number]     Date of Service: 12/18/2020     CC: abdominal pain  Overnight events: No acute events overnight    Subjective     Have seen and examined patient at bedside this morning. Patient is alert, awake, oriented x3. Patient denies abdominal pain, drainage, denies fever or chills. No headache, chest pain, difficult breathing, coughing. Patient had 1 bowel movement yesterday. No urinary symptoms  Had scheduled CT-guided drainage today. Patient is n.p.o. Objective     Physical Exam:  · Vitals: /73   Pulse 72   Temp 98.2 °F (36.8 °C) (Oral)   Resp 20   Ht 5' 2\" (1.575 m)   Wt 118 lb (53.5 kg)   LMP  (LMP Unknown)   SpO2 97%   BMI 21.58 kg/m²     I & O - 24hr: I/O this shift:  In: 100 [IV Piggyback:100]  · Out: 40 [Drains:40]   · General Appearance: alert, appears stated age and cooperative  · HEENT:  Head: Normocephalic, no lesions, without obvious abnormality. · Eye: Normal external eye, conjunctiva, lids cornea, ANIYAH. · Ears: Normal TM's bilaterally. Normal auditory canals and external ears. Non-tender. 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 · Extremities:  extremities normal, atraumatic, no cyanosis or edema  · Musculokeletal: No joint swelling, no muscle tenderness. ROM normal in all joints of extremities.    · Neurologic: Mental status: Alert, oriented, thought content appropriate  Subject  Pertinent Labs & Imaging Studies   crow  CBC with Differential:    Lab Results Component Value Date    WBC 10.7 12/18/2020    RBC 3.45 12/18/2020    HGB 9.5 12/18/2020    HCT 29.7 12/18/2020     12/18/2020    MCV 86.1 12/18/2020    MCH 27.5 12/18/2020    MCHC 32.0 12/18/2020    RDW 14.4 12/18/2020    METASPCT 1.0 11/28/2020    LYMPHOPCT 8.9 12/15/2020    MONOPCT 4.9 12/15/2020    BASOPCT 0.4 12/15/2020    MONOSABS 0.53 12/15/2020    LYMPHSABS 0.96 12/15/2020    EOSABS 0.08 12/15/2020    BASOSABS 0.04 12/15/2020     BMP:    Lab Results   Component Value Date     12/18/2020    K 3.6 12/18/2020    K 3.9 12/14/2020     12/18/2020    CO2 29 12/18/2020    BUN 6 12/18/2020    LABALBU 2.8 12/15/2020    CREATININE 0.6 12/18/2020    CALCIUM 8.9 12/18/2020    GFRAA >60 12/18/2020    LABGLOM >60 12/18/2020    GLUCOSE 107 12/18/2020     Hepatic Function Panel:    Lab Results   Component Value Date    ALKPHOS 105 12/15/2020    ALT 5 12/15/2020    AST 18 12/15/2020    PROT 6.6 12/15/2020    BILITOT 0.3 12/15/2020    BILIDIR <0.2 12/15/2020    IBILI see below 12/15/2020    LABALBU 2.8 12/15/2020     Ionized Calcium:  No results found for: IONCA  Magnesium:  No results found for: MG  Phosphorus:  No results found for: PHOS  LDH:  No results found for: LDH  Troponin:    Lab Results   Component Value Date    TROPONINI <0.01 11/28/2020     U/A:    Lab Results   Component Value Date    COLORU Yellow 11/28/2020    PROTEINU 30 11/28/2020    PHUR 6.0 11/28/2020    WBCUA 5-10 11/28/2020    RBCUA 1-3 11/28/2020    BACTERIA MODERATE 11/28/2020    CLARITYU Clear 11/28/2020    SPECGRAV 1.025 11/28/2020    LEUKOCYTESUR Negative 11/28/2020    UROBILINOGEN 0.2 11/28/2020    BILIRUBINUR Negative 11/28/2020    BLOODU MODERATE 11/28/2020    GLUCOSEU Negative 11/28/2020     HgBA1c:    Lab Results   Component Value Date    LABA1C 5.6 10/30/2019     TSH:  No results found for: TSH  VITAMIN B12: No components found for: B12  FOLATE:    Lab Results   Component Value Date    FOLATE 16.0 03/10/2020     IRON: Lab Results   Component Value Date    IRON 20 12/15/2020     Iron Saturation:  No components found for: PERCENTFE  TIBC:    Lab Results   Component Value Date    TIBC 129 12/15/2020       CXR:     Resident's Assessment and Plan     Dmitri Díaz is a 70 y.o. female past medical history that includes a right pontine ischemic lacunar stroke in 6/2019 with minimal left hemiparesis and dysarthria, meningioma status post resection in 2002 associated with a focal onset seizure disorder. She presents to the emergency department 12/14 with chief complaint of abdominal pain and concerns for drainage around her umbilicus. Recurrent diverticulitis, improving  -Patient history of diverticulosis presents with acute abdominal pain, has previous episode of diverticulitis last month treated with Flagyl and cefdinir.  -Today patient is afebrile, patient denies abdominal pain but states some tenderness. WBC 10.7, abdominal wall fistula is clean, no signs of drainage.   -Continue Zosyn day 5, fluconazole day 4 per ID recommendation.  -Follow-up blood cultures, monitor vitals, CBC.  -Colonoscopy after clinical improve. Periumbilical abdominal wall abscess  -CT scan abdomen showing 6.1 x 4.4 cm abscess deep to the left anterior abdominal worries. Connected to abdominal wall via fistula formation.  -Fistula is wrapped with dressing, no drainage today. Patient is afebrile with no abdominal pain. -General surgery is following. Has CT-guided drainage today. Following after procedure  -PT 13.1, INR 1.2  -Currently on home Plavix and aspirin. Thrombocytosis  -Platelets trending down, 717 today. Patient has chronic thrombocytosis seen November when patient began to have diverticulosis. -Peripheral blood smear shows reactive-like thrombocytosis  -We will continue to follow daily CBC. Pancreatic head lesion  -CT scan abdomen showing 0.8 cm cystic pancreatic head lesion.   -CEA 2.2, CA 19-9 7  -Dr. Rochelle Campo is following patient.  -No intervention at this time. We will follow up imaging after 3 months. Seizures  -Home medication include Keppra and Lacmital  -Continue home medication. Monitor vitals. History of meningioma s/p resection. History of ischemic lacunar stroke. -Currently on home aspirin Plavix for procedures.  -Continue Lipitor. Final note: Discussed with IR for resume DAPT. I agree to resume tomorrow. PT/OT evaluation:N/A  DVT prophylaxis/ GI prophylaxis: N/A  Disposition: current medical care    Heriberto Amaro MD, PGY-1  Attending physician: Dr. Ninfa Lynn.

## 2020-12-19 ENCOUNTER — APPOINTMENT (OUTPATIENT)
Dept: CT IMAGING | Age: 75
DRG: 391 | End: 2020-12-19
Payer: MEDICARE

## 2020-12-19 LAB — GRAM STAIN ORDERABLE: NORMAL

## 2020-12-19 PROCEDURE — 2580000003 HC RX 258: Performed by: RADIOLOGY

## 2020-12-19 PROCEDURE — 6370000000 HC RX 637 (ALT 250 FOR IP): Performed by: INTERNAL MEDICINE

## 2020-12-19 PROCEDURE — 2580000003 HC RX 258: Performed by: SPECIALIST

## 2020-12-19 PROCEDURE — 6360000002 HC RX W HCPCS: Performed by: EMERGENCY MEDICINE

## 2020-12-19 PROCEDURE — 1200000000 HC SEMI PRIVATE

## 2020-12-19 PROCEDURE — 2580000003 HC RX 258: Performed by: EMERGENCY MEDICINE

## 2020-12-19 PROCEDURE — 74177 CT ABD & PELVIS W/CONTRAST: CPT

## 2020-12-19 PROCEDURE — 74176 CT ABD & PELVIS W/O CONTRAST: CPT

## 2020-12-19 PROCEDURE — 6370000000 HC RX 637 (ALT 250 FOR IP): Performed by: SPECIALIST

## 2020-12-19 PROCEDURE — 6360000004 HC RX CONTRAST MEDICATION: Performed by: RADIOLOGY

## 2020-12-19 PROCEDURE — 99232 SBSQ HOSP IP/OBS MODERATE 35: CPT | Performed by: INTERNAL MEDICINE

## 2020-12-19 RX ORDER — ASPIRIN 81 MG/1
81 TABLET, CHEWABLE ORAL DAILY
Status: DISCONTINUED | OUTPATIENT
Start: 2020-12-19 | End: 2020-12-19

## 2020-12-19 RX ORDER — CLOPIDOGREL BISULFATE 75 MG/1
75 TABLET ORAL DAILY
Status: DISCONTINUED | OUTPATIENT
Start: 2020-12-20 | End: 2020-12-20 | Stop reason: HOSPADM

## 2020-12-19 RX ORDER — ASPIRIN 81 MG/1
81 TABLET, CHEWABLE ORAL DAILY
Status: DISCONTINUED | OUTPATIENT
Start: 2020-12-20 | End: 2020-12-20 | Stop reason: HOSPADM

## 2020-12-19 RX ORDER — SODIUM CHLORIDE 0.9 % (FLUSH) 0.9 %
10 SYRINGE (ML) INJECTION
Status: COMPLETED | OUTPATIENT
Start: 2020-12-19 | End: 2020-12-19

## 2020-12-19 RX ORDER — CLOPIDOGREL BISULFATE 75 MG/1
75 TABLET ORAL DAILY
Status: DISCONTINUED | OUTPATIENT
Start: 2020-12-19 | End: 2020-12-19

## 2020-12-19 RX ADMIN — PIPERACILLIN AND TAZOBACTAM 3.38 G: 3; .375 INJECTION, POWDER, LYOPHILIZED, FOR SOLUTION INTRAVENOUS at 19:00

## 2020-12-19 RX ADMIN — LEVETIRACETAM 500 MG: 500 TABLET ORAL at 08:07

## 2020-12-19 RX ADMIN — LAMOTRIGINE 100 MG: 100 TABLET ORAL at 08:08

## 2020-12-19 RX ADMIN — FOLIC ACID 1 MG: 1 TABLET ORAL at 08:07

## 2020-12-19 RX ADMIN — PIPERACILLIN AND TAZOBACTAM 3.38 G: 3; .375 INJECTION, POWDER, LYOPHILIZED, FOR SOLUTION INTRAVENOUS at 04:16

## 2020-12-19 RX ADMIN — SODIUM CHLORIDE 25 ML: 9 INJECTION, SOLUTION INTRAVENOUS at 00:00

## 2020-12-19 RX ADMIN — FLUCONAZOLE 400 MG: 100 TABLET ORAL at 08:08

## 2020-12-19 RX ADMIN — LEVETIRACETAM 500 MG: 500 TABLET ORAL at 20:04

## 2020-12-19 RX ADMIN — PIPERACILLIN AND TAZOBACTAM 3.38 G: 3; .375 INJECTION, POWDER, LYOPHILIZED, FOR SOLUTION INTRAVENOUS at 11:30

## 2020-12-19 RX ADMIN — SODIUM CHLORIDE, PRESERVATIVE FREE 10 ML: 5 INJECTION INTRAVENOUS at 08:14

## 2020-12-19 RX ADMIN — LAMOTRIGINE 100 MG: 100 TABLET ORAL at 20:04

## 2020-12-19 RX ADMIN — ATORVASTATIN CALCIUM 40 MG: 40 TABLET, FILM COATED ORAL at 20:04

## 2020-12-19 RX ADMIN — Medication 10 ML: at 09:09

## 2020-12-19 RX ADMIN — IOHEXOL 50 ML: 240 INJECTION, SOLUTION INTRATHECAL; INTRAVASCULAR; INTRAVENOUS; ORAL at 09:07

## 2020-12-19 RX ADMIN — IOPAMIDOL 90 ML: 755 INJECTION, SOLUTION INTRAVENOUS at 09:08

## 2020-12-19 RX ADMIN — Medication 1 CAPSULE: at 08:07

## 2020-12-19 ASSESSMENT — PAIN SCALES - GENERAL
PAINLEVEL_OUTOF10: 0

## 2020-12-19 NOTE — PLAN OF CARE
Problem: Falls - Risk of:  Goal: Will remain free from falls  Description: Will remain free from falls  12/18/2020 1909 by Deepti Pierce RN  Outcome: Met This Shift     Problem: Falls - Risk of:  Goal: Absence of physical injury  Description: Absence of physical injury  12/18/2020 1909 by Deepti Pierce RN  Outcome: Met This Shift     Problem: Skin Integrity:  Goal: Will show no infection signs and symptoms  Description: Will show no infection signs and symptoms  12/18/2020 1909 by Deepti Pierce RN  Outcome: Met This Shift     Problem: Skin Integrity:  Goal: Absence of new skin breakdown  Description: Absence of new skin breakdown  12/18/2020 1909 by Deepti Pierce RN  Outcome: Met This Shift

## 2020-12-19 NOTE — PROGRESS NOTES
[address] [place] [address] [facility]  Progress Note    SUBJECTIVE:  Chief Complaint   Patient presents with    Abdominal Pain     Sent in for abdominal wound check and diverticulitis. Patient is tolerating medications. No reported adverse drug reactions. No nausea, vomiting, diarrhea. Afebrile. Room air. Resting comfortably in bed. LUQ drain in place     Review of systems:  As stated above in the chief complaint, otherwise negative. Medications:  Scheduled Meds:   [START ON 2020] clopidogrel  75 mg Oral Daily    [START ON 2020] aspirin  81 mg Oral Daily    sodium chloride flush  10 mL Intravenous 2 times per day    [Held by provider] enoxaparin  40 mg Subcutaneous Daily    lamoTRIgine  100 mg Oral BID    levETIRAcetam  500 mg Oral BID    atorvastatin  40 mg Oral Nightly    folic acid  1 mg Oral Daily    piperacillin-tazobactam  3.375 g Intravenous Q8H    And    sodium chloride  25 mL Intravenous Q8H    fluconazole  400 mg Oral Daily    lidocaine 1 % injection  5 mL Intradermal Once    sodium chloride flush  10 mL Intravenous 2 times per day    lactobacillus  1 capsule Oral Daily     Continuous Infusions:  PRN Meds:sodium chloride flush, promethazine **OR** ondansetron, polyethylene glycol, acetaminophen **OR** acetaminophen, sodium chloride flush    OBJECTIVE:  /72   Pulse 68   Temp 96.9 °F (36.1 °C) (Temporal)   Resp 16   Ht 5' 2\" (1.575 m)   Wt 118 lb (53.5 kg)   LMP  (LMP Unknown)   SpO2 96%   BMI 21.58 kg/m²   Temp  Av.5 °F (36.4 °C)  Min: 96.9 °F (36.1 °C)  Max: 98.7 °F (37.1 °C)  Constitutional: The patient is awake, alert, and oriented. NAD. Resting comfortably   Skin: Warm and dry. No rashes were noted. HEENT:  Moist mucous membranes. No ulcerations or thrush. Neck: Supple to movements. Chest: No use of accessory muscles to breathe. Symmetrical expansion. No wheezing, crackles or rhonchi.   Cardiovascular: S1 and S2 are rhythmic and regular. No murmurs appreciated. Abdomen: Positive bowel sounds to auscultation. Benign to palpation. No masses felt. No hepatosplenomegaly. LUQ drain, drainage tubing tan/bloody  Extremities: No clubbing, no cyanosis, no edema.   Lines: peripheral  PICC line right arm 12/16/2020     Laboratory and Tests Review:  Lab Results   Component Value Date    WBC 10.7 12/18/2020    WBC 10.5 12/17/2020    WBC 9.8 12/16/2020    HGB 9.5 (L) 12/18/2020    HCT 29.7 (L) 12/18/2020    MCV 86.1 12/18/2020     (H) 12/18/2020     Lab Results   Component Value Date    NEUTROABS 9.03 (H) 12/15/2020    NEUTROABS 6.95 12/14/2020    NEUTROABS 10.33 (H) 11/28/2020     No results found for: CRPHS  Lab Results   Component Value Date    ALT 5 12/15/2020    AST 18 12/15/2020    GGT 24 01/09/2020    ALKPHOS 105 (H) 12/15/2020    BILITOT 0.3 12/15/2020     Lab Results   Component Value Date     12/18/2020    K 3.6 12/18/2020    K 3.9 12/14/2020     12/18/2020    CO2 29 12/18/2020    BUN 6 12/18/2020    CREATININE 0.6 12/18/2020    CREATININE 0.6 12/17/2020    CREATININE 0.6 12/16/2020    GFRAA >60 12/18/2020    LABGLOM >60 12/18/2020    GLUCOSE 107 12/18/2020    PROT 6.6 12/15/2020    LABALBU 2.8 12/15/2020    CALCIUM 8.9 12/18/2020    BILITOT 0.3 12/15/2020    ALKPHOS 105 12/15/2020    AST 18 12/15/2020    ALT 5 12/15/2020     No results found for: CRP  No results found for: Randy Stands  Radiology:  reviewed    Microbiology:   Lab Results   Component Value Date    BC 24 Hours no growth 12/15/2020    ORG Gram positive cocci 12/18/2020     Lab Results   Component Value Date    BLOODCULT2 24 Hours no growth 12/15/2020    ORG Gram positive cocci 12/18/2020     No results found for: WNDABS  No results found for: RESPSMEAR  No results found for: MPNEUMO, CLAMYDCU, LABLEGI, AFBCX, FUNGSM, LABFUNG  No results found for: CULTRESP  No results found for: CXCATHTIP  No results found for: BFCS  Culture Surgical   Date Value Ref Range Status   12/18/2020   Preliminary    Light growth  Identification and sensitivity to follow       No results found for: LABURIN  No results found for: [address]  No results for input(s): PROCAL in the last 72 hours. ASSESSMENT:  · Intra-abdominal abscess - fluid from culture growing gram positive cocci so far; s/p IR drain 12/18/2020    · Cystic lesions at the head of the pancreas  · Diverticulitis partially treated    PLAN:  · Continue zosyn & fluconazole for now  · IR for drainage of abscess - drain placed 12/18-follow fluid culture  · PICC line   · Check final cultures  · Monitor labs  · Med rec done        Electronically signed by MAYANK Bhakta CNP on 12/19/2020 at 2:23 PM  Pt seen and examined. Above discussed agree with advanced practice nurse. Labs, cultures, and radiographs reviewed. Face to Face encounter occurred. Changes made as necessary.      Freda Burton MD

## 2020-12-19 NOTE — PROGRESS NOTES
[facility]  [facility]  Progress Note - House Team 1    Patient:  Ino Seth 70 y.o. female MRN: [id number]     Date of Service: 12/19/2020     CC: abdominal pain  Overnight events: No acute events overnight    Subjective     Examined present bedside this morning. Patient is alert, awake, oriented x3. S/p CT-guided intra-abdominal abscesses drainage yesterday. Patient denies fever or chills, no abdominal pain, no chest pain, tenderness site is clean, no erythema. Bright blood is draining out from the surgical site. Objective     Physical Exam:  · Vitals: /72   Pulse 68   Temp 96.9 °F (36.1 °C) (Temporal)   Resp 16   Ht 5' 2\" (1.575 m)   Wt 118 lb (53.5 kg)   LMP  (LMP Unknown)   SpO2 96%   BMI 21.58 kg/m²     · I & O - 24hr: No intake/output data recorded. · General Appearance: alert, appears stated age and cooperative  · HEENT:  Head: Normocephalic, no lesions, without obvious abnormality. · Eye: Normal external eye, conjunctiva, lids cornea, ANIYAH. · Ears: Normal TM's bilaterally. Normal auditory canals and external ears. Non-tender. · Neck: no adenopathy, no carotid bruit, no JVD, supple, symmetrical, trachea midline and thyroid not enlarged, symmetric, no tenderness/mass/nodules  · Lung: clear to auscultation bilaterally  · Heart: regular rate and rhythm, S1, S2 normal, no murmur, click, rub or gallop  · Abdomen: soft, non-tender; bowel sounds normal; no masses,  no organomegaly and IR drain placed, no overlying erythema or induration. · Extremities:  extremities normal, atraumatic, no cyanosis or edema  · Musculokeletal: No joint swelling, no muscle tenderness. ROM normal in all joints of extremities.    · Neurologic: Mental status: Alert, oriented, thought content appropriate  Subject  Pertinent Labs & Imaging Studies   crow  CBC with Differential:    Lab Results   Component Value Date    WBC 10.7 12/18/2020    RBC 3.45 12/18/2020 HGB 9.5 12/18/2020    HCT 29.7 12/18/2020     12/18/2020    MCV 86.1 12/18/2020    MCH 27.5 12/18/2020    MCHC 32.0 12/18/2020    RDW 14.4 12/18/2020    METASPCT 1.0 11/28/2020    LYMPHOPCT 8.9 12/15/2020    MONOPCT 4.9 12/15/2020    BASOPCT 0.4 12/15/2020    MONOSABS 0.53 12/15/2020    LYMPHSABS 0.96 12/15/2020    EOSABS 0.08 12/15/2020    BASOSABS 0.04 12/15/2020     BMP:    Lab Results   Component Value Date     12/18/2020    K 3.6 12/18/2020    K 3.9 12/14/2020     12/18/2020    CO2 29 12/18/2020    BUN 6 12/18/2020    LABALBU 2.8 12/15/2020    CREATININE 0.6 12/18/2020    CALCIUM 8.9 12/18/2020    GFRAA >60 12/18/2020    LABGLOM >60 12/18/2020    GLUCOSE 107 12/18/2020     Hepatic Function Panel:    Lab Results   Component Value Date    ALKPHOS 105 12/15/2020    ALT 5 12/15/2020    AST 18 12/15/2020    PROT 6.6 12/15/2020    BILITOT 0.3 12/15/2020    BILIDIR <0.2 12/15/2020    IBILI see below 12/15/2020    LABALBU 2.8 12/15/2020     Ionized Calcium:  No results found for: IONCA  Magnesium:  No results found for: MG  Phosphorus:  No results found for: PHOS  LDH:  No results found for: LDH  Troponin:    Lab Results   Component Value Date    TROPONINI <0.01 11/28/2020     U/A:    Lab Results   Component Value Date    COLORU Yellow 11/28/2020    PROTEINU 30 11/28/2020    PHUR 6.0 11/28/2020    WBCUA 5-10 11/28/2020    RBCUA 1-3 11/28/2020    BACTERIA MODERATE 11/28/2020    CLARITYU Clear 11/28/2020    SPECGRAV 1.025 11/28/2020    LEUKOCYTESUR Negative 11/28/2020    UROBILINOGEN 0.2 11/28/2020    BILIRUBINUR Negative 11/28/2020    BLOODU MODERATE 11/28/2020    GLUCOSEU Negative 11/28/2020     HgBA1c:    Lab Results   Component Value Date    LABA1C 5.6 10/30/2019     TSH:  No results found for: TSH  VITAMIN B12: No components found for: B12  FOLATE:    Lab Results   Component Value Date    FOLATE 16.0 03/10/2020     IRON:    Lab Results   Component Value Date    IRON 20 12/15/2020     Iron thrombocytosis seen November when patient began to have diverticulosis. -Peripheral blood smear shows reactive-like thrombocytosis  -We will continue to follow daily CBC. Pancreatic head lesion  -CT scan abdomen showing 0.8 cm cystic pancreatic head lesion. -CEA 2.2, CA 19-9 7  -Dr. Christa Lynn is following patient.  -No intervention at this time. We will follow up imaging after 3 months. Seizures  -Home medication include Keppra and Lacmital  -Continue home medication. Monitor vitals. History of meningioma s/p resection. History of ischemic lacunar stroke. -Currently on home aspirin Plavix for procedures.  -Continue Lipitor. PT/OT evaluation:N/A  DVT prophylaxis/ GI prophylaxis: N/A  Disposition: current medical care    Jose Eduardo Arias MD, PGY-1  Attending physician: Dr. Andrew Herrera.

## 2020-12-19 NOTE — PROGRESS NOTES
Patient had CT abd/pelvis with contrast at 9am this morning. Ordering Dr is being perfect served to see why this CT needs repeated today.

## 2020-12-19 NOTE — PROGRESS NOTES
GENERAL SURGERY  DAILY PROGRESS NOTE  12/19/2020    Subjective:  Doing well this morning. No nausea or vomiting. Tolerating diet. Passing flatus and having bowel movements. Objective:  /68   Pulse 65   Temp 96.9 °F (36.1 °C) (Temporal)   Resp 14   Ht 5' 2\" (1.575 m)   Wt 118 lb (53.5 kg)   LMP  (LMP Unknown)   SpO2 96%   BMI 21.58 kg/m²     GENERAL:  Laying in bed, awake, cooperative, no apparent distress  HEAD: Normocephalic  LUNGS:  No increased work of breathing  CARDIOVASCULAR:  RR  ABDOMEN:  Soft, mildly tender, non-distended. IR drain in place without overlying erythema or induration. No drainage around drain site. EXTREMITIES: No edema or swelling      Assessment/Plan:  70 y.o. female with fistula formation from an intra-abdominal abscess.  SP IR drain on 12/18    - Overall care per primary  - Tolerating Low Fiber Diet  - IR abscess drainage 12/18  - OK to resume DAPT   - ID recs for Abx  - No other acute surgical intervention needed at this time   - Patient is alright for discharge from general surgery perspective    Electronically signed by Naye Velasquez DO on 12/19/2020 at 7:42 AM

## 2020-12-19 NOTE — PLAN OF CARE
Problem: Falls - Risk of:  Goal: Will remain free from falls  Description: Will remain free from falls  12/19/2020 1632 by Juice Caraballo  Outcome: Met This Shift  12/19/2020 1029 by Juice Caraballo  Outcome: Met This Shift  Goal: Absence of physical injury  Description: Absence of physical injury  12/19/2020 1632 by Juice Caraballo  Outcome: Met This Shift  12/19/2020 1029 by Juice Caraballo  Outcome: Met This Shift     Problem: Skin Integrity:  Goal: Will show no infection signs and symptoms  Description: Will show no infection signs and symptoms  Outcome: Met This Shift  Goal: Absence of new skin breakdown  Description: Absence of new skin breakdown  12/19/2020 1632 by Juice Caraballo  Outcome: Met This Shift  12/19/2020 1029 by Juice Caraballo  Outcome: Met This Shift

## 2020-12-19 NOTE — PLAN OF CARE
Problem: Falls - Risk of:  Goal: Will remain free from falls  Description: Will remain free from falls  12/19/2020 0030 by Mayuri Molina RN  Outcome: Met This Shift     Problem: Falls - Risk of:  Goal: Absence of physical injury  Description: Absence of physical injury  12/19/2020 0030 by Mayuri Molina RN  Outcome: Met This Shift     Problem: Skin Integrity:  Goal: Absence of new skin breakdown  Description: Absence of new skin breakdown  12/19/2020 0030 by Mayuri Molina RN  Outcome: Met This Shift

## 2020-12-19 NOTE — PLAN OF CARE
Problem: Falls - Risk of:  Goal: Will remain free from falls  Description: Will remain free from falls  12/19/2020 1029 by Eliezer Miranda  Outcome: Met This Shift  12/19/2020 0030 by Nataly Perdue RN  Outcome: Met This Shift  Goal: Absence of physical injury  Description: Absence of physical injury  12/19/2020 1029 by Eliezer Miranda  Outcome: Met This Shift  12/19/2020 0030 by Nataly Perdue RN  Outcome: Met This Shift     Problem: Skin Integrity:  Goal: Absence of new skin breakdown  Description: Absence of new skin breakdown  12/19/2020 1029 by Eliezer Miranda  Outcome: Met This Shift  12/19/2020 0030 by Nataly Perdue RN  Outcome: Met This Shift

## 2020-12-20 VITALS
TEMPERATURE: 98.1 F | DIASTOLIC BLOOD PRESSURE: 70 MMHG | BODY MASS INDEX: 21.71 KG/M2 | WEIGHT: 118 LBS | HEIGHT: 62 IN | HEART RATE: 86 BPM | SYSTOLIC BLOOD PRESSURE: 103 MMHG | RESPIRATION RATE: 16 BRPM | OXYGEN SATURATION: 97 %

## 2020-12-20 LAB
ANAEROBIC CULTURE: NORMAL
ANION GAP SERPL CALCULATED.3IONS-SCNC: 7 MMOL/L (ref 7–16)
APTT: 26.4 SEC (ref 24.5–35.1)
BLOOD CULTURE, ROUTINE: NORMAL
BUN BLDV-MCNC: 6 MG/DL (ref 8–23)
CALCIUM SERPL-MCNC: 8.5 MG/DL (ref 8.6–10.2)
CHLORIDE BLD-SCNC: 98 MMOL/L (ref 98–107)
CO2: 28 MMOL/L (ref 22–29)
CREAT SERPL-MCNC: 0.5 MG/DL (ref 0.5–1)
CULTURE, BLOOD 2: NORMAL
GFR AFRICAN AMERICAN: >60
GFR NON-AFRICAN AMERICAN: >60 ML/MIN/1.73
GLUCOSE BLD-MCNC: 104 MG/DL (ref 74–99)
HCT VFR BLD CALC: 29.7 % (ref 34–48)
HEMOGLOBIN: 9.4 G/DL (ref 11.5–15.5)
MCH RBC QN AUTO: 27.6 PG (ref 26–35)
MCHC RBC AUTO-ENTMCNC: 31.6 % (ref 32–34.5)
MCV RBC AUTO: 87.1 FL (ref 80–99.9)
PDW BLD-RTO: 14.7 FL (ref 11.5–15)
PLATELET # BLD: 627 E9/L (ref 130–450)
PMV BLD AUTO: 8.4 FL (ref 7–12)
POTASSIUM SERPL-SCNC: 3.1 MMOL/L (ref 3.5–5)
RBC # BLD: 3.41 E12/L (ref 3.5–5.5)
SODIUM BLD-SCNC: 133 MMOL/L (ref 132–146)
WBC # BLD: 11.2 E9/L (ref 4.5–11.5)

## 2020-12-20 PROCEDURE — 6370000000 HC RX 637 (ALT 250 FOR IP): Performed by: INTERNAL MEDICINE

## 2020-12-20 PROCEDURE — 85730 THROMBOPLASTIN TIME PARTIAL: CPT

## 2020-12-20 PROCEDURE — 80048 BASIC METABOLIC PNL TOTAL CA: CPT

## 2020-12-20 PROCEDURE — 85027 COMPLETE CBC AUTOMATED: CPT

## 2020-12-20 PROCEDURE — 2580000003 HC RX 258: Performed by: SPECIALIST

## 2020-12-20 PROCEDURE — 36415 COLL VENOUS BLD VENIPUNCTURE: CPT

## 2020-12-20 PROCEDURE — 2580000003 HC RX 258: Performed by: INTERNAL MEDICINE

## 2020-12-20 PROCEDURE — 6360000002 HC RX W HCPCS: Performed by: EMERGENCY MEDICINE

## 2020-12-20 PROCEDURE — 6370000000 HC RX 637 (ALT 250 FOR IP): Performed by: STUDENT IN AN ORGANIZED HEALTH CARE EDUCATION/TRAINING PROGRAM

## 2020-12-20 PROCEDURE — 2580000003 HC RX 258: Performed by: EMERGENCY MEDICINE

## 2020-12-20 PROCEDURE — 99232 SBSQ HOSP IP/OBS MODERATE 35: CPT | Performed by: INTERNAL MEDICINE

## 2020-12-20 PROCEDURE — 6370000000 HC RX 637 (ALT 250 FOR IP): Performed by: SPECIALIST

## 2020-12-20 RX ORDER — POTASSIUM CHLORIDE 20 MEQ/1
40 TABLET, EXTENDED RELEASE ORAL
Status: DISCONTINUED | OUTPATIENT
Start: 2020-12-21 | End: 2020-12-20 | Stop reason: HOSPADM

## 2020-12-20 RX ORDER — POTASSIUM CHLORIDE 20 MEQ/1
40 TABLET, EXTENDED RELEASE ORAL ONCE
Status: COMPLETED | OUTPATIENT
Start: 2020-12-20 | End: 2020-12-20

## 2020-12-20 RX ADMIN — LAMOTRIGINE 100 MG: 100 TABLET ORAL at 09:29

## 2020-12-20 RX ADMIN — SODIUM CHLORIDE, PRESERVATIVE FREE 10 ML: 5 INJECTION INTRAVENOUS at 09:32

## 2020-12-20 RX ADMIN — POTASSIUM CHLORIDE 40 MEQ: 1500 TABLET, EXTENDED RELEASE ORAL at 09:28

## 2020-12-20 RX ADMIN — FLUCONAZOLE 400 MG: 100 TABLET ORAL at 09:29

## 2020-12-20 RX ADMIN — Medication 10 ML: at 09:51

## 2020-12-20 RX ADMIN — PIPERACILLIN AND TAZOBACTAM 3.38 G: 3; .375 INJECTION, POWDER, LYOPHILIZED, FOR SOLUTION INTRAVENOUS at 11:43

## 2020-12-20 RX ADMIN — LEVETIRACETAM 500 MG: 500 TABLET ORAL at 09:28

## 2020-12-20 RX ADMIN — PIPERACILLIN AND TAZOBACTAM 3.38 G: 3; .375 INJECTION, POWDER, LYOPHILIZED, FOR SOLUTION INTRAVENOUS at 04:19

## 2020-12-20 RX ADMIN — CLOPIDOGREL 75 MG: 75 TABLET, FILM COATED ORAL at 09:29

## 2020-12-20 RX ADMIN — Medication 1 CAPSULE: at 09:29

## 2020-12-20 RX ADMIN — ASPIRIN 81 MG: 81 TABLET, CHEWABLE ORAL at 09:28

## 2020-12-20 RX ADMIN — FOLIC ACID 1 MG: 1 TABLET ORAL at 09:28

## 2020-12-20 ASSESSMENT — PAIN SCALES - GENERAL: PAINLEVEL_OUTOF10: 0

## 2020-12-20 NOTE — CARE COORDINATION
Pt being discharged, spoke with [facility], they will have nurse in home for 7pm dose. They will need script faxed to [phone]. Notified floor RN of the need for fax. Called resident, and left message of information, received call back from resident, updated him that pt can go home and [facility] will be at home for  7pm dose. Raegan MCNAIR

## 2020-12-20 NOTE — PROGRESS NOTES
[address] [place] [address] [facility]  Progress Note    SUBJECTIVE:  Chief Complaint   Patient presents with    Abdominal Pain     Sent in for abdominal wound check and diverticulitis. Patient is tolerating medications. No reported adverse drug reactions. No nausea, vomiting, diarrhea. Afebrile. Room air 97%. Resting comfortably in bed. LUQ drain in place - dark brown/milky drainage     Review of systems:  As stated above in the chief complaint, otherwise negative. Medications:  Scheduled Meds:   [START ON 2020] potassium chloride  40 mEq Oral Daily with breakfast    clopidogrel  75 mg Oral Daily    aspirin  81 mg Oral Daily    sodium chloride flush  10 mL Intravenous 2 times per day    [Held by provider] enoxaparin  40 mg Subcutaneous Daily    lamoTRIgine  100 mg Oral BID    levETIRAcetam  500 mg Oral BID    atorvastatin  40 mg Oral Nightly    folic acid  1 mg Oral Daily    piperacillin-tazobactam  3.375 g Intravenous Q8H    And    sodium chloride  25 mL Intravenous Q8H    fluconazole  400 mg Oral Daily    lidocaine 1 % injection  5 mL Intradermal Once    sodium chloride flush  10 mL Intravenous 2 times per day    lactobacillus  1 capsule Oral Daily     Continuous Infusions:  PRN Meds:sodium chloride flush, promethazine **OR** ondansetron, polyethylene glycol, acetaminophen **OR** acetaminophen, sodium chloride flush    OBJECTIVE:  /70   Pulse 86   Temp 98.1 °F (36.7 °C) (Temporal)   Resp 16   Ht 5' 2\" (1.575 m)   Wt 118 lb (53.5 kg)   LMP  (LMP Unknown)   SpO2 97%   BMI 21.58 kg/m²   Temp  Av °F (36.7 °C)  Min: 97.5 °F (36.4 °C)  Max: 98.3 °F (36.8 °C)  Constitutional: The patient is awake, alert, and oriented. NAD. Skin: Warm and dry. No rashes were noted. HEENT:  Moist mucous membranes. No ulcerations or thrush. Neck: Supple to movements. Chest: No use of accessory muscles to breathe. Symmetrical expansion.   No wheezing, crackles or rhonchi. Cardiovascular: S1 and S2 are rhythmic and regular. No murmurs appreciated. Abdomen: Positive bowel sounds to auscultation. Benign to palpation. No masses felt. No hepatosplenomegaly. LUQ drain, drainage tubing dark brown/milky  Extremities: No clubbing, no cyanosis, no edema.   Lines: peripheral  PICC line right arm 12/16/2020     Laboratory and Tests Review:  Lab Results   Component Value Date    WBC 11.2 12/20/2020    WBC 10.7 12/18/2020    WBC 10.5 12/17/2020    HGB 9.4 (L) 12/20/2020    HCT 29.7 (L) 12/20/2020    MCV 87.1 12/20/2020     (H) 12/20/2020     Lab Results   Component Value Date    NEUTROABS 9.03 (H) 12/15/2020    NEUTROABS 6.95 12/14/2020    NEUTROABS 10.33 (H) 11/28/2020     No results found for: CRPHS  Lab Results   Component Value Date    ALT 5 12/15/2020    AST 18 12/15/2020    GGT 24 01/09/2020    ALKPHOS 105 (H) 12/15/2020    BILITOT 0.3 12/15/2020     Lab Results   Component Value Date     12/20/2020    K 3.1 12/20/2020    K 3.9 12/14/2020    CL 98 12/20/2020    CO2 28 12/20/2020    BUN 6 12/20/2020    CREATININE 0.5 12/20/2020    CREATININE 0.6 12/18/2020    CREATININE 0.6 12/17/2020    GFRAA >60 12/20/2020    LABGLOM >60 12/20/2020    GLUCOSE 104 12/20/2020    PROT 6.6 12/15/2020    LABALBU 2.8 12/15/2020    CALCIUM 8.5 12/20/2020    BILITOT 0.3 12/15/2020    ALKPHOS 105 12/15/2020    AST 18 12/15/2020    ALT 5 12/15/2020     No results found for: CRP  No results found for: [address]    Radiology:  reviewed    Microbiology:   Lab Results   Component Value Date    BC 5 Days no growth 12/15/2020    ORG Gram positive cocci 12/18/2020    ORG Gram positive cocci 12/18/2020     Lab Results   Component Value Date    BLOODCULT2 5 Days no growth 12/15/2020    ORG Gram positive cocci 12/18/2020    ORG Gram positive cocci 12/18/2020     No results found for: WNDABS  No results found for: RESPSMEAR  No results found for: MPNEUMO, CLAMYDCU, LABLEGI, AFBCX, FUNGSM, LABFUNG  No results found for: CULTRESP  No results found for: CXCATHTIP  No results found for: BFCS  Culture Surgical   Date Value Ref Range Status   12/18/2020   Preliminary    Heavy growth  Identification and sensitivity to follow     12/18/2020   Preliminary    Heavy growth  Identification and sensitivity to follow       No results found for: LABURIN  No results found for: [facility]  No results for input(s): PROCAL in the last 72 hours. ASSESSMENT:  · Intra-abdominal abscess - fluid from culture growing gram positive cocci so far; s/p IR drain 12/18/2020    · Cystic lesions at the head of the pancreas  · Diverticulitis partially treated    PLAN:  · Continue zosyn & fluconazole for now  · IR for drainage of abscess - drain placed 12/18- follow fluid culture  · PICC line   · Check final cultures  · Monitor labs  · Med rec done - ok to [place] home today with Cornelio Valdivia        Electronically signed by MAYANK Barahona CNP on 12/20/2020 at 2:09 PM  Pt seen and examined. Above discussed agree with advanced practice nurse. Labs, cultures, and radiographs reviewed. Face to Face encounter occurred. Changes made as necessary.      Randy Howell MD

## 2020-12-20 NOTE — DISCHARGE INSTR - COC
Continuity of Care Form    Patient Name: Flex Huff   :  1945  MRN:  [id number]    Admit date:  2020  Discharge date:  20    Code Status Order: Full Code   Advance Directives:   [facility] Documentation     Date/Time Healthcare Directive Type of Healthcare Directive Copy in [facility] [address] Agent's Name Healthcare Agent's Phone Number    12/15/20 9667  No, patient does not have an advance directive for healthcare treatment -- -- -- -- --          Admitting Physician:  Zack Bueno MD  PCP: Maritza Naidu MD    Discharging Nurse: REDD [place] Unit/Room#: 4223/5082-E  Discharging Unit Phone Number: [phone]    Emergency Contact:   Extended Emergency Contact Information  Primary Emergency Contact: [address] Phone: [phone]  Mobile Phone: [phone]  Relation: Child  Secondary Emergency Contact: [facility] Phone: [phone]  Relation: Child   needed?  No    Past Surgical History:  Past Surgical History:   Procedure Laterality Date    BRAIN MENINGIOMA EXCISION      HYSTERECTOMY      HYSTERECTOMY, VAGINAL  2014    Total       Immunization History:   Immunization History   Administered Date(s) Administered    Influenza, Triv, inactivated, subunit, adjuvanted, IM (Fluad 65 yrs and older) 2019    Pneumococcal Conjugate 13-valent (Ccey Moore) 2019       Active Problems:  Patient Active Problem List   Diagnosis Code    [facility] I63.50    Localization-related epilepsy ([facility] G40.109    History of resection of meningioma Z98.890, Z86.03    Elevated alkaline phosphatase level R74.8    Primary insomnia F51.01    Other fatigue R53.83    Serum calcium elevated E83.52    Vitamin D deficiency E55.9    Mixed hyperlipidemia E78.2    History of meningioma Z86.018    Hoarseness R49.0    Hemiparesis affecting left side as late effect of stroke ([facility] E61.390    Lesion of pancreas K86.9    Diverticulitis K57.92    Vertigo R42    Weakness R53.1    Pneumonia of left lower lobe due to infectious organism J18.9    Diverticulitis of colon K57.32    Pancreatic cyst K86.2    Abdominal wall abscess L02.211       Isolation/Infection:   Isolation          No Isolation        Patient Infection Status     Infection Onset Added Last Indicated Last Indicated By Review Planned Expiration Resolved Resolved By    None active    Resolved    COVID-19 Rule Out 12/03/20 12/03/20 12/03/20 COVID-19 Ambulatory (Ordered)   12/05/20 Rule-Out Test Resulted          Nurse Assessment:  Last Vital Signs: /70   Pulse 86   Temp 98.1 °F (36.7 °C) (Temporal)   Resp 16   Ht 5' 2\" (1.575 m)   Wt 118 lb (53.5 kg)   LMP  (LMP Unknown)   SpO2 97%   BMI 21.58 kg/m²     Last documented pain score (0-10 scale): Pain Level: 0  Last Weight:   Wt Readings from Last 1 Encounters:   12/14/20 118 lb (53.5 kg)     Mental Status:  oriented and alert    IV Access:  - PICC - site  R Upper Arm, insertion date: 12/16/20    Nursing Mobility/ADLs:  Walking   Independent  Transfer  Independent  Bathing  Independent  Dressing  Independent  Toileting  Independent  Feeding  Independent  Med Admin  Assisted  Med Delivery   whole    Wound Care Documentation and Therapy:  Wound 79/68/60 Umbilicus Lower;Medial 1 cm (Active)   Dressing Status New dressing applied 12/15/20 0930   Dressing/Treatment Open to air 12/20/20 0800   Wound Length (cm) 1 cm 12/15/20 0200   Drainage Amount None 12/19/20 1530   Drainage Description Serous 12/15/20 2123   Odor None 12/17/20 1535   Deb-wound Assessment Intact 12/17/20 1535   Number of days: 5        Elimination:  Continence:   · Bowel:  Yes  · Bladder: Yes  Urinary Catheter: None   Colostomy/Ileostomy/Ileal Conduit: No       Date of Last BM: ***    Intake/Output Summary (Last 24 hours) at 12/20/2020 1546  Last data filed at 12/20/2020 1124  Gross per 24 hour   Intake {CHP DME Changes in IMBCP:[id number]}    PHYSICIAN SIGNATURE:  {Esignature:[id number]}

## 2020-12-20 NOTE — PROGRESS NOTES
Isaak Montes [facility]  Internal Medicine Residency / [address]    Attending Physician Statement  I have discussed the case, including pertinent history and exam findings with the resident and the team.  I have seen and examined the patient and the key elements of the encounter have been performed by me. I have also personally reviewed imaging studies and labs. I agree with the assessment, plan and orders as documented by the resident. Doing well today. No fevers, abdominal pain, nausea, vomiting. Imaging discussed with patient. Assessment:  1. Abdominal wall abscess s/p IR guided drainage and LLQ drain  2. Diverticulitis, improving. Imaging reviewed with patient. 3. Thrombocytosis, improving. This is likely reactive from her current infection as the rise coincided with the start of her infection and has been improving with current treatment. 4. Incidental finding of pancreatic head cystic lesion - evaluated by Dr Araceli Douglass team yesterday, no need to go to scheduled follow up this week, recommend follow up imaging in 3 months  5. CVA 2019, DAPT has been resumed  6. S/p resection of meningioma  7. Seizures, on keppra and lamictal        Plan:   Clinically doing well but discussed she will need close follow up given the findings on her CT scan  She has PICC line and daughter-in-law is a nurse who can help administer medications  For discharge today if okay with ID and GS  Discussed importance of close follow up       Remainder of medical problems as per resident note. Pedro Bryant MD  Internal Medicine Residency Faculty

## 2020-12-20 NOTE — PROGRESS NOTES
GENERAL SURGERY  DAILY PROGRESS NOTE  12/20/2020    Subjective:    Patient was seen and evaluated, CT scan was reviewed. Drain is still in place with minimal brown purulent discharge. She denies any nausea, vomiting. She states that she is having normal bowel function. Has been able to handle her diet without any difficulties. Denies any abdominal pain or tenderness. She has been afebrile, normotensive and no signs of tachycardia. White blood cell count has been stable. At this point in time we still feel comfortable with patient being discharged. She needs to continue her antibiotics per infectious disease. She has appointment scheduled to follow-up with Dr. Jer Kerns and Dr. Angela Rosario. She will also need a follow-up with interventional radiology for a drain check.     Electronically signed by Aston San DO on 12/20/2020 at 2:45 PM

## 2020-12-20 NOTE — PLAN OF CARE
I talked to the  about Ms Veronica Moncada, she confirmed with Marolyn Councilman that the patient will have her first infusion delivered and infused today at 7 PM. Then I called the patient's son and daughter-in-law and explain the [facility] infusion and the follow ups the patient will need. Patient also had new CT done after general surgery signoff yesterdy. Finding were discussed with the gen surg Dr. Jadyn Mac. We are waiting for their clearance. On call resident Dr. Govind Epstein can be contacted who will discharge the patient . Med rec is ready.     Jacklyn Fernandez MD  Internal medicine

## 2020-12-20 NOTE — PLAN OF CARE
Problem: Falls - Risk of:  Goal: Will remain free from falls  Description: Will remain free from falls  12/20/2020 1847 by Mahesh Tipton  Outcome: Completed  12/20/2020 1250 by Mahesh Tipton  Outcome: Met This Shift  Goal: Absence of physical injury  Description: Absence of physical injury  12/20/2020 1847 by Mahesh Tipton  Outcome: Completed  12/20/2020 1250 by Mahesh Tipton  Outcome: Met This Shift     Problem: Skin Integrity:  Goal: Will show no infection signs and symptoms  Description: Will show no infection signs and symptoms  Outcome: Completed  Goal: Absence of new skin breakdown  Description: Absence of new skin breakdown  12/20/2020 1847 by Mahesh Tipton  Outcome: Completed  12/20/2020 1250 by Mahesh Tipton  Outcome: Met This Shift

## 2020-12-21 ENCOUNTER — TELEPHONE (OUTPATIENT)
Dept: ADMINISTRATIVE | Age: 75
End: 2020-12-21

## 2020-12-21 NOTE — TELEPHONE ENCOUNTER
Pt sched FU appt for 01/05. Would like to know if it's ok to be seen in office. Last time she was told not to come in. Wanted to see if her appt needs to be changed to [facility]. Follow up for abdominal abscess. Thank you.

## 2020-12-22 LAB
CULTURE SURGICAL: ABNORMAL
CULTURE SURGICAL: ABNORMAL
ORGANISM: ABNORMAL
ORGANISM: ABNORMAL

## 2020-12-22 NOTE — DISCHARGE SUMMARY
[address]  Discharge Summary    PCP: Irma Ruiz MD    Admit Date:12/14/2020  Discharge Date: 12/20/2020    Admission Diagnosis:   1. Acute diverticulitis  2. Periumbilical abscess with fistula formation  3. History of right pontine ischemic lacunar stroke in 6/2019  4. History of meningioma s/p resection in 2002 associated with focal onset seizure disorder    Discharge Diagnosis:  1. Recurrent diverticulitis, improving  2. Periumbilical abdominal wall abscess s/p CT- guided drainage  3. Reactive thrombocytosis  4. Pancreatic head lesion  5. Hx seizures  6. History of meningioma s/p resection. 7. History of ischemic lacunar stroke    Hospital Course: The patient is a 70 y.o. female with a past medical history that includes a right pontine ischemic lacunar stroke in 6/2019 with minimal left hemiparesis and dysarthria, meningioma status post resection in 2002 associated with a focal onset seizure disorder. She presents to the emergency department 12/14 with chief complaint of abdominal pain and concerns for drainage around her umbilicus. Her abdominal pain is located in the left lower quadrant and has been worsening the past week. She does have a history of diverticulosis and a previous episode of diverticulitis, on 11/20, at which point she completed therapy with Flagyl and Cefdinir. She states her abdominal pain in her left lower quadrant is similar to when she had diverticulitis the first time. She also presents today for periumbilical pain and drainage. She reports that she has had an abscess located just below and to the left of her umbilicus that burst approximately 1 week ago while she was getting imaging of her abdomen. She reports since then it has been draining yellow fluid. Patient denies any infectious symptoms such as fevers chills nausea or vomiting or recent sick contacts, also denies constipation diarrhea or pain with passing stools.   Of note, patient was also evaluated in the ED on 11/28 with concerns for weakness, found to have pneumonia and discharged home on Levaquin which the patient reports she has completed.     She is evaluated in the ED with a CT abdomen pelvis significant for multiloculated lesions left anterior abdomen most likely representing abscess formation with concomitant fistula formation, in addition to severe diverticulitis. She was started on Zosyn, with a general surgery consultation, and to be admitted to general floor. On the floor,  the symptom is improving. Patient denies any abdominal pain, the fistula site is clean wrapped with dressing, no sign of drainage. Vitals stable. Physical exam showing no abdominal tenderness. Infectious disease was following, patient was started on Zosyn and fluconazole as well as schedule CT-guided drainage of the abscess. Patient has history of CVA previously on aspirin and Plavix. Those medication is on hold for 5-day before to CT guided abdominal abscess drainage is done. CT abdomen pelvis findings include 0.8 cm cystic pancreatic head lesion. Oncology is following. Recommend follow-up with imaging in 3 months. After 5 days of holding Plavix and aspirin, patient had a procedure done without complication. Repeat CT scan abdomen pelvis showed suggestive of colonic mucocutaneous fistula, severe diverticulitis extension into mesenteric fat planes and subphrenic abscess. General surgery and ID  agreed for patient to discharge home with continue antibiotic for 21-day and follow-up with ID as outpatient. Today patient is discharged in stable condition after review medication and schedule follow-up with PCP and specialist.    Significant findings (history and exam, laboratory, radiological, pathology, other tests):   · General Appearance: alert, appears stated age and cooperative  · HEENT:  Head: Normocephalic, no lesions, without obvious abnormality.   · Eye: Normal external eye, conjunctiva, lids cornea, ANIYAH. · Nose: Normal external nose, mucus membranes and septum. · Pharynx: Dental Hygiene adequate. Normal buccal mucosa. Normal pharynx. · Neck / Thyroid: Supple, no masses, nodes, nodules or enlargement. · Neck: no adenopathy, no carotid bruit, no JVD, supple, symmetrical, trachea midline and thyroid not enlarged, symmetric, no tenderness/mass/nodules  · Lung: clear to auscultation bilaterally  · Heart: regular rate and rhythm, S1, S2 normal, no murmur, click, rub or gallop  · Abdomen: soft, non-tender; bowel sounds normal; no masses,  no organomegaly  · Extremities:  extremities normal, atraumatic, no cyanosis or edema  · Musculokeletal: No joint swelling, no muscle tenderness. ROM normal in all joints of extremities. · Neurologic: Mental status: Alert, oriented, thought content appropriate        Pending test results: none    Consults:  1. General surgery  2. Oncology  3. ID    Procedures:  1.  CT-guided intra-abdominal abscess drainage    Condition at discharge: Stable    Disposition: home    Discharge Medications:  Discharge Medication List as of 12/22/2020  8:54 AM      START taking these medications    Details   piperacillin-tazobactam (ZOSYN) infusion Infuse 3.375 g intravenously every 8 hours for 21 days Compound per protocol., Disp-212.625 g, R-0Print      fluconazole (DIFLUCAN) 200 MG tablet Take 2 tablets by mouth daily for 21 days, Disp-42 tablet, R-0Print         CONTINUE these medications which have NOT CHANGED    Details   lamoTRIgine (LAMICTAL) 100 MG tablet Take 1 tablet by mouth 2 times daily, Disp-60 tablet,R-5Normal      levETIRAcetam (KEPPRA) 500 MG tablet TAKE 1 TABLET BY MOUTH TWO  TIMES DAILY, Disp-180 tablet,R-1Requesting 1 year supplyNormal      clopidogrel (PLAVIX) 75 MG tablet TAKE 1 TABLET BY MOUTH ONCE DAILY, Disp-90 tablet,R-1Requesting 1 year supplyNormal      atorvastatin (LIPITOR) 40 MG tablet Take 1 tablet by mouth nightly, Disp-90 tablet,R-0Requesting 1 year supplyNormal      folic acid (FOLVITE) 1 MG tablet Take 1 tablet by mouth daily, Disp-90 tablet,R-1Normal      ibuprofen (IBUPROFEN 100 JESSICA STRENGTH) 100 MG chewable tablet Take 100 mg by mouth every 8 hours as needed for PainHistorical Med      aspirin 81 MG chewable tablet Take 1 tablet by mouth daily, Disp-90 tablet, R-0Normal             Activity: activity as tolerated  Diet: regular diet    Follow-up appointments:   Juwan Sierra III, MD  Schedule an appointment as soon as possible for a visit in 3 months  For repeat imaging of pancreas  [facility] [address] Nella  Schedule an appointment as soon as possible for a visit in 2 weeks  If symptoms worsen, For routine followup  [address] [place] [address] [address] [id number]   [address] [facility]      [address] [facility]   Alonzo Castleman, MD  Schedule an appointment as soon as possible for a visit in 1 week  Hospital follow up  [facility]. [facility] [address] Cami Alvarez MD  In 1 week  drain recheck  [facility] [phone]       Patient Instructions: [facility] Internal Medicine Resident Service      Discharge to:  Home  Diet: Regular  Activity: As tolerated     Continue fluconazole twice daily and Zosyn 3 times daily for 21 days. Continue all other medication as prescribed. Follow-up with PCP and infectious disease doctor. To the ED if you experience severe abdominal pain, nausea vomiting, fever, severe diarrhea or bloody stool.      Jonathon Jordan MD  PGY 1   12:32 PM 12/20/2020

## 2020-12-23 ENCOUNTER — HOSPITAL ENCOUNTER (OUTPATIENT)
Age: 75
Discharge: HOME | End: 2020-12-25
Payer: MEDICARE

## 2020-12-23 PROCEDURE — U0003 INFECTIOUS AGENT DETECTION BY NUCLEIC ACID (DNA OR RNA); SEVERE ACUTE RESPIRATORY SYNDROME CORONAVIRUS 2 (SARS-COV-2) (CORONAVIRUS DISEASE [COVID-19]), AMPLIFIED PROBE TECHNIQUE, MAKING USE OF HIGH THROUGHPUT TECHNOLOGIES AS DESCRIBED BY CMS-2020-01-R: HCPCS

## 2020-12-24 LAB
ALBUMIN SERPL-MCNC: 2.9 G/DL (ref 3.5–5.2)
ALP BLD-CCNC: 87 U/L (ref 35–104)
ALT SERPL-CCNC: <5 U/L (ref 0–32)
ANION GAP SERPL CALCULATED.3IONS-SCNC: 11 MMOL/L (ref 7–16)
AST SERPL-CCNC: 12 U/L (ref 0–31)
BASOPHILS ABSOLUTE: 0.04 E9/L (ref 0–0.2)
BASOPHILS RELATIVE PERCENT: 0.7 % (ref 0–2)
BILIRUB SERPL-MCNC: 0.3 MG/DL (ref 0–1.2)
BUN BLDV-MCNC: 7 MG/DL (ref 8–23)
CALCIUM SERPL-MCNC: 9 MG/DL (ref 8.6–10.2)
CHLORIDE BLD-SCNC: 100 MMOL/L (ref 98–107)
CO2: 29 MMOL/L (ref 22–29)
CREAT SERPL-MCNC: 0.5 MG/DL (ref 0.5–1)
EOSINOPHILS ABSOLUTE: 0.05 E9/L (ref 0.05–0.5)
EOSINOPHILS RELATIVE PERCENT: 0.8 % (ref 0–6)
GFR AFRICAN AMERICAN: >60
GFR NON-AFRICAN AMERICAN: >60 ML/MIN/1.73
GLUCOSE BLD-MCNC: 121 MG/DL (ref 74–99)
HCT VFR BLD CALC: 28.8 % (ref 34–48)
HEMOGLOBIN: 9.2 G/DL (ref 11.5–15.5)
IMMATURE GRANULOCYTES #: 0.03 E9/L
IMMATURE GRANULOCYTES %: 0.5 % (ref 0–5)
LYMPHOCYTES ABSOLUTE: 0.98 E9/L (ref 1.5–4)
LYMPHOCYTES RELATIVE PERCENT: 16.6 % (ref 20–42)
MCH RBC QN AUTO: 27.8 PG (ref 26–35)
MCHC RBC AUTO-ENTMCNC: 31.9 % (ref 32–34.5)
MCV RBC AUTO: 87 FL (ref 80–99.9)
MONOCYTES ABSOLUTE: 0.57 E9/L (ref 0.1–0.95)
MONOCYTES RELATIVE PERCENT: 9.6 % (ref 2–12)
NEUTROPHILS ABSOLUTE: 4.24 E9/L (ref 1.8–7.3)
NEUTROPHILS RELATIVE PERCENT: 71.8 % (ref 43–80)
PDW BLD-RTO: 15 FL (ref 11.5–15)
PLATELET # BLD: 598 E9/L (ref 130–450)
PMV BLD AUTO: 8.6 FL (ref 7–12)
POTASSIUM SERPL-SCNC: 3.2 MMOL/L (ref 3.5–5)
RBC # BLD: 3.31 E12/L (ref 3.5–5.5)
SODIUM BLD-SCNC: 140 MMOL/L (ref 132–146)
TOTAL PROTEIN: 6.9 G/DL (ref 6.4–8.3)
WBC # BLD: 5.9 E9/L (ref 4.5–11.5)

## 2020-12-25 LAB
SARS-COV-2: NOT DETECTED
SOURCE: NORMAL

## 2020-12-31 RX ORDER — POTASSIUM CHLORIDE 20 MEQ/1
20 TABLET, EXTENDED RELEASE ORAL 2 TIMES DAILY
Qty: 180 TABLET | Refills: 1 | Status: SHIPPED
Start: 2020-12-31 | End: 2021-01-05 | Stop reason: SDUPTHER

## 2021-01-04 ENCOUNTER — HOSPITAL ENCOUNTER (OUTPATIENT)
Dept: CT IMAGING | Age: 76
Discharge: HOME | End: 2021-01-06
Payer: MEDICARE

## 2021-01-04 DIAGNOSIS — L02.211 ABDOMINAL WALL ABSCESS: ICD-10-CM

## 2021-01-04 PROCEDURE — 76080 X-RAY EXAM OF FISTULA: CPT

## 2021-01-04 PROCEDURE — 76080 X-RAY EXAM OF FISTULA: CPT | Performed by: RADIOLOGY

## 2021-01-04 PROCEDURE — 6360000004 HC RX CONTRAST MEDICATION: Performed by: RADIOLOGY

## 2021-01-04 RX ADMIN — IOPAMIDOL 1 ML: 612 INJECTION, SOLUTION INTRAVENOUS at 09:25

## 2021-01-04 NOTE — PROGRESS NOTES
Patient is here for a tube check, she states there is very little draining, mostly from the flush  There is some drainage in her bag, approximately 55 cc, of tan fluid. Per Dr Vernell Nieto S order the tube will be pulled. Discharge instructions given, all questions answered.

## 2021-01-05 ENCOUNTER — OFFICE VISIT (OUTPATIENT)
Dept: FAMILY MEDICINE CLINIC | Age: 76
End: 2021-01-05
Payer: MEDICARE

## 2021-01-05 ENCOUNTER — TELEPHONE (OUTPATIENT)
Dept: NEUROLOGY | Age: 76
End: 2021-01-05

## 2021-01-05 ENCOUNTER — TELEPHONE (OUTPATIENT)
Dept: FAMILY MEDICINE CLINIC | Age: 76
End: 2021-01-05

## 2021-01-05 VITALS
HEIGHT: 62 IN | TEMPERATURE: 97.8 F | OXYGEN SATURATION: 98 % | DIASTOLIC BLOOD PRESSURE: 68 MMHG | WEIGHT: 110 LBS | RESPIRATION RATE: 16 BRPM | SYSTOLIC BLOOD PRESSURE: 108 MMHG | HEART RATE: 95 BPM | BODY MASS INDEX: 20.24 KG/M2

## 2021-01-05 DIAGNOSIS — Z23 NEED FOR PNEUMOCOCCAL VACCINATION: ICD-10-CM

## 2021-01-05 DIAGNOSIS — E87.6 HYPOKALEMIA: ICD-10-CM

## 2021-01-05 DIAGNOSIS — K86.2 PANCREATIC CYST: ICD-10-CM

## 2021-01-05 DIAGNOSIS — L02.91 ABSCESS: ICD-10-CM

## 2021-01-05 DIAGNOSIS — D64.9 NORMOCYTIC ANEMIA: ICD-10-CM

## 2021-01-05 DIAGNOSIS — Z00.00 ENCOUNTER FOR SUBSEQUENT ANNUAL WELLNESS VISIT (AWV) IN MEDICARE PATIENT: Primary | ICD-10-CM

## 2021-01-05 DIAGNOSIS — L98.8 FISTULA: ICD-10-CM

## 2021-01-05 DIAGNOSIS — K57.92 DIVERTICULITIS: ICD-10-CM

## 2021-01-05 DIAGNOSIS — Z78.0 POSTMENOPAUSAL: ICD-10-CM

## 2021-01-05 DIAGNOSIS — Z23 NEED FOR INFLUENZA VACCINATION: ICD-10-CM

## 2021-01-05 PROCEDURE — G8484 FLU IMMUNIZE NO ADMIN: HCPCS | Performed by: FAMILY MEDICINE

## 2021-01-05 PROCEDURE — 4040F PNEUMOC VAC/ADMIN/RCVD: CPT | Performed by: FAMILY MEDICINE

## 2021-01-05 PROCEDURE — G0008 ADMIN INFLUENZA VIRUS VAC: HCPCS | Performed by: FAMILY MEDICINE

## 2021-01-05 PROCEDURE — G0439 PPPS, SUBSEQ VISIT: HCPCS | Performed by: FAMILY MEDICINE

## 2021-01-05 PROCEDURE — 3017F COLORECTAL CA SCREEN DOC REV: CPT | Performed by: FAMILY MEDICINE

## 2021-01-05 PROCEDURE — 1123F ACP DISCUSS/DSCN MKR DOCD: CPT | Performed by: FAMILY MEDICINE

## 2021-01-05 PROCEDURE — 90694 VACC AIIV4 NO PRSRV 0.5ML IM: CPT | Performed by: FAMILY MEDICINE

## 2021-01-05 PROCEDURE — 99213 OFFICE O/P EST LOW 20 MIN: CPT | Performed by: FAMILY MEDICINE

## 2021-01-05 RX ORDER — POTASSIUM CHLORIDE 20 MEQ/1
20 TABLET, EXTENDED RELEASE ORAL 2 TIMES DAILY
Qty: 9 TABLET | Refills: 0
Start: 2021-01-05 | End: 2021-01-26 | Stop reason: CLARIF

## 2021-01-05 RX ORDER — ONDANSETRON 4 MG/1
4 TABLET, FILM COATED ORAL EVERY 12 HOURS PRN
Qty: 15 TABLET | Refills: 0 | Status: SHIPPED
Start: 2021-01-05 | End: 2021-04-08 | Stop reason: ALTCHOICE

## 2021-01-05 ASSESSMENT — PATIENT HEALTH QUESTIONNAIRE - PHQ9
SUM OF ALL RESPONSES TO PHQ QUESTIONS 1-9: 0
SUM OF ALL RESPONSES TO PHQ QUESTIONS 1-9: 0
SUM OF ALL RESPONSES TO PHQ9 QUESTIONS 1 & 2: 0
2. FEELING DOWN, DEPRESSED OR HOPELESS: 0

## 2021-01-05 NOTE — PATIENT INSTRUCTIONS
Personalized Preventive Plan for Ino Seth - 1/5/2021  Medicare offers a range of preventive health benefits. Some of the tests and screenings are paid in full while other may be subject to a deductible, co-insurance, and/or copay. Some of these benefits include a comprehensive review of your medical history including lifestyle, illnesses that may run in your family, and various assessments and screenings as appropriate. After reviewing your medical record and screening and assessments performed today your provider may have ordered immunizations, labs, imaging, and/or referrals for you. A list of these orders (if applicable) as well as your Preventive Care list are included within your After Visit Summary for your review. Other Preventive Recommendations:    · A preventive eye exam performed by an eye specialist is recommended every 1-2 years to screen for glaucoma; cataracts, macular degeneration, and other eye disorders. · A preventive dental visit is recommended every 6 months. · Try to get at least 150 minutes of exercise per week or 10,000 steps per day on a pedometer . · Order or download the FREE \"Exercise & Physical Activity: Your Everyday Guide\" from [employer]. Call 0-508.519.4635 or search [employer] online. · You need 1209-8618 mg of calcium and 9119-9084 IU of vitamin D per day. It is possible to meet your calcium requirement with diet alone, but a vitamin D supplement is usually necessary to meet this goal.  · When exposed to the sun, use a sunscreen that protects against both UVA and UVB radiation with an SPF of 30 or greater. Reapply every 2 to 3 hours or after sweating, drying off with a towel, or swimming. Always wear a seat belt when traveling in a car. Always wear a helmet when riding a bicycle or motorcycle. diverPatient Education        Learning About Diverticulosis and Diverticulitis  What are diverticulosis and diverticulitis? In diverticulosis and diverticulitis, pouches called diverticula form in the wall of the large intestine, or colon. In diverticulosis, the pouches do not cause any pain or other symptoms. In diverticulitis, the pouches get inflamed or infected and cause symptoms. Doctors aren't sure what causes these pouches in the colon. But they think that a low-fiber diet may play a role. Without fiber to add bulk to the stool, the colon has to work harder than normal to push the stool forward. The pressure from this may cause pouches to form in weak spots along the colon. Some people with diverticulosis get diverticulitis. But experts don't know why this happens. What are the symptoms? In diverticulosis, most people don't have symptoms. But pouches sometimes bleed. In diverticulitis, symptoms may last from a few hours to a week or more. They include:  Belly pain. This is usually in the lower left side. It is sometimes worse when you move. This is the most common symptom. Fever and chills. Bloating and gas. Diarrhea or constipation. Nausea and sometimes vomiting. Not feeling like eating. How can you prevent diverticulitis? You may be able to lower your chance of getting diverticulitis. You can do this by taking steps to prevent constipation. Eat fruits, vegetables, beans, and whole grains every day. These foods are high in fiber. Drink plenty of fluids. (Drink enough so that your urine is light yellow or clear like water.) If you have kidney, heart, or liver disease and have to limit fluids, talk with your doctor before you increase the amount of fluids you drink. Get at least 30 minutes of exercise on most days of the week. Walking is a good choice. You also may want to do other activities, such as running, swimming, cycling, or playing tennis or team sports. Take a fiber supplement, such as Citrucel or Metamucil, every day if needed. Read and follow all instructions on the label.   Schedule time each day for a bowel movement. Having a daily routine may help. Take your time and do not strain when having a bowel movement. Some people avoid nuts, seeds, berries, and popcorn. They believe that these foods might get trapped in the diverticula and cause pain. But there is no proof that these foods cause diverticulitis or make it worse. How are these problems treated? The best way to treat diverticulosis is to avoid constipation. Treatment for diverticulitis includes antibiotics. It often includes a change in your diet. You may need only liquids at first. Your doctor may suggest pain medicines for pain or belly cramps. In some cases, surgery may be needed. Follow-up care is a key part of your treatment and safety. Be sure to make and go to all appointments, and call your doctor if you are having problems. It's also a good idea to know your test results and keep a list of the medicines you take. Where can you learn more? Go to [email or web address]. org and sign in to your [employer] account. Enter G324 in the [employer] box to learn more about \"Learning About Diverticulosis and Diverticulitis. \"     If you do not have an account, please click on the \"Sign Up Now\" link. Current as of: April 15, 2020               Content Version: 12.6  © 2348-7040 [employer]. Care instructions adapted under license by [facility] ([facility]). If you have questions about a medical condition or this instruction, always ask your healthcare professional. [place] [address] any warranty or liability for your use of this information.

## 2021-01-05 NOTE — TELEPHONE ENCOUNTER
Pt's daughter in law Teena Voss left message stating that she spoke with pt's Neurologist about d/c aspirin 81mg and Plavix. (There is an encounter from Neurology in pt's chart). Teena Voss asking if you will d/c the Plavix (or both Plavix and Aspirin) as Neurology did not wish to d/c since they were not the original prescriber. Teena Voss also wanted to notify you that pt is seeing ID on 01/12/21.       Electronically signed by Domonique Ma MA on 1/5/21 at 4:09 PM EST

## 2021-01-05 NOTE — TELEPHONE ENCOUNTER
Spoke with pt daughter in-law Glo Young), whom stated she would like to know if pt needs to take both Plavix and low dose aspirin?       Ruben Vizcaino [phone]

## 2021-01-05 NOTE — TELEPHONE ENCOUNTER
Message noted. Generally, low-dose aspirin 81 mg daily is recommended. Increased bleeding risk with addition of Plavix. If recurrent TIA or stroke symptoms, then increase of aspirin to 162 mg a day is generally recommended or short-term use of Plavix added to 1 baby aspirin for 30-days post-stroke.

## 2021-01-05 NOTE — PROGRESS NOTES
Medicare Annual Wellness Visit  Name: Christo Lorenzo Date: 2021   MRN: [id number] Sex: Female   Age: 70 y.o. Ethnicity: Non-/Non    : 1945 Race: Cheryl Villavicencio is here for Medicare AWV, Other (Abdominal abscess follow up), and Discuss Medications (Potassium supplements are causing nausea. To discuss possibly discontinuing this. )    Screenings for behavioral, psychosocial and functional/safety risks, and cognitive dysfunction are all negative except as indicated below. These results, as well as other patient data from the [address] form, are documented in Flowsheets linked to this Encounter. No Known Allergies      Prior to Visit Medications    Medication Sig Taking?  Authorizing Provider   ondansetron (ZOFRAN) 4 MG tablet Take 1 tablet by mouth every 12 hours as needed for Nausea or Vomiting Yes Zeina Caro MD   potassium chloride (KLOR-CON M) 20 MEQ extended release tablet Take 1 tablet by mouth 2 times daily for 3 days Yes Zeina Caro MD   lamoTRIgine (LAMICTAL) 100 MG tablet Take 1 tablet by mouth 2 times daily Yes Bernardino Hatch MD   levETIRAcetam (KEPPRA) 500 MG tablet TAKE 1 TABLET BY MOUTH TWO  TIMES DAILY Yes Catalina Vail DO   atorvastatin (LIPITOR) 40 MG tablet Take 1 tablet by mouth nightly Yes Catalina Vail DO   folic acid (FOLVITE) 1 MG tablet Take 1 tablet by mouth daily Yes Chino Vail DO   ibuprofen (IBUPROFEN 100 JESSICA STRENGTH) 100 MG chewable tablet Take 100 mg by mouth every 8 hours as needed for Pain Yes Historical Provider, MD   aspirin 81 MG chewable tablet Take 1 tablet by mouth daily Yes Garcia Crespo MD         Past Medical History:   Diagnosis Date    Cerebral artery occlusion with cerebral infarction [facility])     Hemiparesis affecting left side as late effect of stroke ([facility] [address] 2020    Hyperlipidemia     Localization-related epilepsy ([facility] [address] 2019       Past Surgical History:   Procedure Laterality Date    BRAIN MENINGIOMA EXCISION      HYSTERECTOMY      HYSTERECTOMY, VAGINAL  2014    Total       History reviewed. No pertinent family history. CareTeam (Including outside providers/suppliers regularly involved in providing care):   Patient Care Team:  Jessica Baldwin MD as PCP - General (Family Medicine)  Jessica Baldwin MD as PCP - [facility] Provider    Wt Readings from Last 3 Encounters:   01/05/21 110 lb (49.9 kg)   12/14/20 118 lb (53.5 kg)   11/28/20 118 lb (53.5 kg)     Vitals:    01/05/21 1301   BP: 108/68   Site: Left Upper Arm   Position: Sitting   Cuff Size: Large Adult   Pulse: 95   Resp: 16   Temp: 97.8 °F (36.6 °C)   TempSrc: Temporal   SpO2: 98%   Weight: 110 lb (49.9 kg)   Height: 5' 2\" (1.575 m)     Body mass index is 20.12 kg/m². Based upon direct observation of the patient, evaluation of cognition reveals recent and remote memory intact. Review of Systems   Constitutional: Negative for appetite change, fatigue and fever. Respiratory: Negative for cough, shortness of breath and wheezing. Cardiovascular: Negative for chest pain and palpitations. Gastrointestinal: Negative for abdominal pain, blood in stool, constipation, diarrhea, nausea and vomiting. Physical Exam  Constitutional:       General: She is not in acute distress. Appearance: She is well-developed. She is not diaphoretic. Picc line in place. HENT:      Head: Normocephalic and atraumatic. Eyes:      Conjunctiva/sclera: Conjunctivae normal.      Pupils: Pupils are equal, round, and reactive to light. Neck:      Musculoskeletal: Normal range of motion and neck supple. Cardiovascular:      Rate and Rhythm: Normal rate and regular rhythm. Pulmonary:      Effort: Pulmonary effort is normal.      Breath sounds: Normal breath sounds. Abdominal:      General: Bowel sounds are normal. There is no distension. Palpations: Abdomen is soft.       Tenderness: any weight without trying in the past 3 months?: (!) Yes  Do you eat fewer than 2 meals per day?: No  Have you seen a dentist within the past year?: (!) No  Body mass index: 20.12  Health Habits/Nutrition Interventions:  · with recent acute infection with abscess formation. Monitor weight. Patient's appetite is improving. Weight check in 1 week. Exercise with PT. Recommend yearly dental visits. Hearing/Vision:  No exam data present  Hearing/Vision  Do you or your family notice any trouble with your hearing?: (!) Yes  Do you have difficulty driving, watching TV, or doing any of your daily activities because of your eyesight?: (!) Yes  Have you had an eye exam within the past year?: Yes  Hearing/Vision Interventions:  · recommend vision and hearing screenings     ADL:  ADLs  In the past 7 days, did you need help from others to perform any of the following everyday activities? Eating, dressing, grooming, bathing, toileting, or walking/balance?: None  In the past 7 days, did you need help from others to take care of any of the following?  Laundry, housekeeping, banking/finances, shopping, telephone use, food preparation, transportation, or taking medications?: [employer], Housekeeping, Banking/Finances, Shopping, Transportation  ADL Interventions:  · family helps patient    Personalized Preventive Plan   Current Health Maintenance Status  Immunization History   Administered Date(s) Administered    Influenza, Quadv, adjuvanted, 65 yrs +, IM, PF (Fluad) 01/05/2021    Influenza, Triv, inactivated, subunit, adjuvanted, IM (Fluad 65 yrs and older) 11/19/2019    Pneumococcal Conjugate 13-valent (Vee Yeung) 11/19/2019        Health Maintenance   Topic Date Due    DTaP/Tdap/Td vaccine (1 - Tdap) 05/22/1966    Breast cancer screen  05/22/1997    Shingles Vaccine (1 of 2) 05/22/1997    Colon cancer screen colonoscopy  05/22/1997    DEXA (modify frequency per FRAX score)  05/22/2002    Annual Wellness Visit (AWV) 01/09/2020    Lipid screen  10/30/2020    Pneumococcal 65+ years Vaccine (2 of 2 - PPSV23) 11/19/2020    Flu vaccine  Completed    Hepatitis C screen  Completed    Hepatitis A vaccine  Aged Out    Hepatitis B vaccine  Aged Out    Hib vaccine  Aged Out    Meningococcal (ACWY) vaccine  Aged Out     Recommendations for RV ID Due: see orders and patient instructions/AVS.  . Recommended screening schedule for the next 5-10 years is provided to the patient in written form: see Patient Mel Kimble was seen today for medicare awv, other and discuss medications. Diagnoses and all orders for this visit:    Encounter for subsequent annual wellness visit (AWV) in Medicare patient    Diverticulitis  Severe with abscess and fistula formation. Patient to follow up with ID and general surgery. Colonoscopy screening per general surgery recommendation s/p recent acute infection. Abscess    Fistula    Pancreatic cyst  Per Oncology repeat MRI of Abdomen WWO contrast in 3m. Order placed. Seeing Myranda in March    Hypokalemia  -     ondansetron (ZOFRAN) 4 MG tablet; Take 1 tablet by mouth every 12 hours as needed for Nausea or Vomiting  -     Comprehensive Metabolic Panel; Future  -     potassium chloride (KLOR-CON M) 20 MEQ extended release tablet; Take 1 tablet by mouth 2 times daily for 3 days    Normocytic anemia  -     Ferritin; Future  -     Folate; Future  -     Iron and TIBC;  Future  -     Vitamin B12; Future  -     CBC Auto Differential; Future    Postmenopausal  -     DEXA BONE DENSITY AXIAL SKELETON; Future    Need for influenza vaccination  -     INFLUENZA, QUADV, ADJUVANTED, 65 YRS =, IM, PF, PREFILL SYR, 0.5ML (FLUAD)    Need for pneumococcal vaccination  -     Cancel: PNEUMOVAX 23 subcutaneous/IM (Pneumococcal polysaccharide vaccine 23-valent >= 3yo)

## 2021-01-06 NOTE — TELEPHONE ENCOUNTER
Read over Neurology's recommendation. Ok to continue Aspirin 81 mg/qd and discontinue Plavix at this time if no history of recurrent stroke.  Please update medicine list.

## 2021-01-06 NOTE — TELEPHONE ENCOUNTER
Patient notified; medicine list updated.   Electronically signed by Carin Maya on 1/6/2021 at 2:05 PM

## 2021-01-12 ENCOUNTER — TELEPHONE (OUTPATIENT)
Dept: FAMILY MEDICINE CLINIC | Age: 76
End: 2021-01-12

## 2021-01-12 NOTE — TELEPHONE ENCOUNTER
Patient called, she had bloodwork done yesterday and is asking if she is able to discontinue her potassium supplement? Please advise.

## 2021-01-13 ENCOUNTER — TELEPHONE (OUTPATIENT)
Dept: FAMILY MEDICINE CLINIC | Age: 76
End: 2021-01-13

## 2021-01-13 NOTE — TELEPHONE ENCOUNTER
Pt contacted office requesting to know if she needs to remain on potassium supplement based on most recent labs. Please advise.     Electronically signed by Js Milner MA on 1/13/21 at 3:55 PM EST

## 2021-01-13 NOTE — TELEPHONE ENCOUNTER
Potassium looks good. Was suppose to stop K after 3 days. Stop potassium at this time and recheck bmp in 1 week.

## 2021-01-14 ENCOUNTER — TELEPHONE (OUTPATIENT)
Dept: FAMILY MEDICINE CLINIC | Age: 76
End: 2021-01-14

## 2021-01-14 NOTE — TELEPHONE ENCOUNTER
This MA returned phone call to Camille Rondon. Advised Silvana that pt can d/c K supplement. Camille Rondon verbalized she understood. This MA also advised Camille Rondon that pt needs repeat BMP in 1 week. Camille Rondon states that pt's ID doctor ordered labs that included a BMP. Camille Rondon is planning on taking pt to a [facility] lab next week to have these drawn so orders will be in pt's chart.       Electronically signed by Tito Cheek MA on 1/14/21 at 10:20 AM EST

## 2021-01-18 ENCOUNTER — HOSPITAL ENCOUNTER (OUTPATIENT)
Dept: CT IMAGING | Age: 76
Discharge: HOME | End: 2021-01-20
Payer: MEDICARE

## 2021-01-18 DIAGNOSIS — K57.92 DIVERTICULITIS: ICD-10-CM

## 2021-01-18 DIAGNOSIS — L02.211 ABDOMINAL WALL ABSCESS: ICD-10-CM

## 2021-01-18 PROCEDURE — 74177 CT ABD & PELVIS W/CONTRAST: CPT

## 2021-01-18 PROCEDURE — 6360000004 HC RX CONTRAST MEDICATION: Performed by: RADIOLOGY

## 2021-01-18 RX ORDER — SODIUM CHLORIDE 0.9 % (FLUSH) 0.9 %
10 SYRINGE (ML) INJECTION
Status: ACTIVE | OUTPATIENT
Start: 2021-01-18 | End: 2021-01-18

## 2021-01-18 RX ADMIN — IOPAMIDOL 90 ML: 755 INJECTION, SOLUTION INTRAVENOUS at 08:55

## 2021-01-18 RX ADMIN — IOHEXOL 50 ML: 240 INJECTION, SOLUTION INTRATHECAL; INTRAVASCULAR; INTRAVENOUS; ORAL at 08:55

## 2021-01-26 ENCOUNTER — HOSPITAL ENCOUNTER (OUTPATIENT)
Age: 76
Discharge: HOME | End: 2021-01-26
Payer: MEDICARE

## 2021-01-26 ENCOUNTER — OFFICE VISIT (OUTPATIENT)
Dept: FAMILY MEDICINE CLINIC | Age: 76
End: 2021-01-26
Payer: MEDICARE

## 2021-01-26 VITALS
HEART RATE: 71 BPM | DIASTOLIC BLOOD PRESSURE: 70 MMHG | SYSTOLIC BLOOD PRESSURE: 112 MMHG | HEIGHT: 62 IN | BODY MASS INDEX: 19.69 KG/M2 | WEIGHT: 107 LBS | RESPIRATION RATE: 18 BRPM | TEMPERATURE: 97.1 F | OXYGEN SATURATION: 98 %

## 2021-01-26 DIAGNOSIS — L02.211 ABDOMINAL WALL ABSCESS: ICD-10-CM

## 2021-01-26 DIAGNOSIS — K57.92 DIVERTICULITIS: ICD-10-CM

## 2021-01-26 DIAGNOSIS — H61.20 IMPACTED CERUMEN, UNSPECIFIED LATERALITY: ICD-10-CM

## 2021-01-26 DIAGNOSIS — L30.9 DERMATITIS: Primary | ICD-10-CM

## 2021-01-26 LAB
ALBUMIN SERPL-MCNC: 4.1 G/DL (ref 3.5–5.2)
ALP BLD-CCNC: 122 U/L (ref 35–104)
ALT SERPL-CCNC: 16 U/L (ref 0–32)
ANION GAP SERPL CALCULATED.3IONS-SCNC: 11 MMOL/L (ref 7–16)
AST SERPL-CCNC: 23 U/L (ref 0–31)
BASOPHILS ABSOLUTE: 0.06 E9/L (ref 0–0.2)
BASOPHILS RELATIVE PERCENT: 1.4 % (ref 0–2)
BILIRUB SERPL-MCNC: 0.3 MG/DL (ref 0–1.2)
BUN BLDV-MCNC: 17 MG/DL (ref 8–23)
C-REACTIVE PROTEIN: 0.3 MG/DL (ref 0–0.4)
CALCIUM SERPL-MCNC: 9.9 MG/DL (ref 8.6–10.2)
CHLORIDE BLD-SCNC: 101 MMOL/L (ref 98–107)
CO2: 28 MMOL/L (ref 22–29)
CREAT SERPL-MCNC: 0.7 MG/DL (ref 0.5–1)
EOSINOPHILS ABSOLUTE: 0.33 E9/L (ref 0.05–0.5)
EOSINOPHILS RELATIVE PERCENT: 8 % (ref 0–6)
GFR AFRICAN AMERICAN: >60
GFR NON-AFRICAN AMERICAN: >60 ML/MIN/1.73
GLUCOSE BLD-MCNC: 91 MG/DL (ref 74–99)
HCT VFR BLD CALC: 37.3 % (ref 34–48)
HEMOGLOBIN: 11.4 G/DL (ref 11.5–15.5)
IMMATURE GRANULOCYTES #: 0 E9/L
IMMATURE GRANULOCYTES %: 0 % (ref 0–5)
LYMPHOCYTES ABSOLUTE: 1.11 E9/L (ref 1.5–4)
LYMPHOCYTES RELATIVE PERCENT: 26.8 % (ref 20–42)
MCH RBC QN AUTO: 28.2 PG (ref 26–35)
MCHC RBC AUTO-ENTMCNC: 30.6 % (ref 32–34.5)
MCV RBC AUTO: 92.3 FL (ref 80–99.9)
MONOCYTES ABSOLUTE: 0.29 E9/L (ref 0.1–0.95)
MONOCYTES RELATIVE PERCENT: 7 % (ref 2–12)
NEUTROPHILS ABSOLUTE: 2.35 E9/L (ref 1.8–7.3)
NEUTROPHILS RELATIVE PERCENT: 56.8 % (ref 43–80)
PDW BLD-RTO: 18.2 FL (ref 11.5–15)
PLATELET # BLD: 419 E9/L (ref 130–450)
PMV BLD AUTO: 8.6 FL (ref 7–12)
POTASSIUM SERPL-SCNC: 4.6 MMOL/L (ref 3.5–5)
RBC # BLD: 4.04 E12/L (ref 3.5–5.5)
SEDIMENTATION RATE, ERYTHROCYTE: 79 MM/HR (ref 0–20)
SODIUM BLD-SCNC: 140 MMOL/L (ref 132–146)
TOTAL PROTEIN: 7.7 G/DL (ref 6.4–8.3)
WBC # BLD: 4.1 E9/L (ref 4.5–11.5)

## 2021-01-26 PROCEDURE — 36415 COLL VENOUS BLD VENIPUNCTURE: CPT

## 2021-01-26 PROCEDURE — 1090F PRES/ABSN URINE INCON ASSESS: CPT | Performed by: FAMILY MEDICINE

## 2021-01-26 PROCEDURE — 3017F COLORECTAL CA SCREEN DOC REV: CPT | Performed by: FAMILY MEDICINE

## 2021-01-26 PROCEDURE — 85025 COMPLETE CBC W/AUTO DIFF WBC: CPT

## 2021-01-26 PROCEDURE — 85651 RBC SED RATE NONAUTOMATED: CPT

## 2021-01-26 PROCEDURE — G8427 DOCREV CUR MEDS BY ELIG CLIN: HCPCS | Performed by: FAMILY MEDICINE

## 2021-01-26 PROCEDURE — G8420 CALC BMI NORM PARAMETERS: HCPCS | Performed by: FAMILY MEDICINE

## 2021-01-26 PROCEDURE — G8484 FLU IMMUNIZE NO ADMIN: HCPCS | Performed by: FAMILY MEDICINE

## 2021-01-26 PROCEDURE — 1036F TOBACCO NON-USER: CPT | Performed by: FAMILY MEDICINE

## 2021-01-26 PROCEDURE — G8400 PT W/DXA NO RESULTS DOC: HCPCS | Performed by: FAMILY MEDICINE

## 2021-01-26 PROCEDURE — 99214 OFFICE O/P EST MOD 30 MIN: CPT | Performed by: FAMILY MEDICINE

## 2021-01-26 PROCEDURE — 86140 C-REACTIVE PROTEIN: CPT

## 2021-01-26 PROCEDURE — 4040F PNEUMOC VAC/ADMIN/RCVD: CPT | Performed by: FAMILY MEDICINE

## 2021-01-26 PROCEDURE — 80053 COMPREHEN METABOLIC PANEL: CPT

## 2021-01-26 PROCEDURE — 1123F ACP DISCUSS/DSCN MKR DOCD: CPT | Performed by: FAMILY MEDICINE

## 2021-01-26 RX ORDER — TRIAMCINOLONE ACETONIDE 0.25 MG/G
OINTMENT TOPICAL
Qty: 80 G | Refills: 0 | Status: SHIPPED | OUTPATIENT
Start: 2021-01-26 | End: 2021-02-02

## 2021-01-26 ASSESSMENT — ENCOUNTER SYMPTOMS
WHEEZING: 0
ABDOMINAL PAIN: 0
SHORTNESS OF BREATH: 0
CONSTIPATION: 0
VOMITING: 0
COUGH: 0
NAUSEA: 0
DIARRHEA: 0

## 2021-01-26 NOTE — PROGRESS NOTES
[facility])  Family Medicine Outpatient        SUBJECTIVE:  CC: had concerns including Rash (Pt c/o rash on bilateral lower legs x11 days, first started off as itching and is now burning ). HPI:Cristiane Larry presented to the clinic for a routine visit. This is a 81-year-old female presenting to the office today with concerns of a rash on her bilateral lower extremities. This approximately 11 days and has also been itchy. Her daughter-in-law got her over-the-counter topical steroid cream which has helped some but the rash is still persisting. Denies any changes in diet, wanders surgeon, lotions, etc. He reports being done her IV antibiotics as of last Tuesday and is due to follow-up with infectious diseases next week. She reports feeling well and got an updated CT recently. Also of note the patient reports that she has been using ear drops in her ears to help with wax buildup and would like to know if she can get her ears flushed today. Review of Systems   Constitutional: Negative for appetite change, fatigue and fever. HENT: Negative for ear pain and hearing loss. Respiratory: Negative for cough, shortness of breath and wheezing. Cardiovascular: Negative for chest pain and palpitations. Gastrointestinal: Negative for abdominal pain, constipation, diarrhea, nausea and vomiting. Skin: Positive for rash.        Outpatient Medications Marked as Taking for the 1/26/21 encounter (Office Visit) with Karime Vallejo MD   Medication Sig Dispense Refill    triamcinolone (KENALOG) 0.025 % ointment Apply topically 2 times daily x 10 days 80 g 0    ondansetron (ZOFRAN) 4 MG tablet Take 1 tablet by mouth every 12 hours as needed for Nausea or Vomiting 15 tablet 0    lamoTRIgine (LAMICTAL) 100 MG tablet Take 1 tablet by mouth 2 times daily 60 tablet 5    levETIRAcetam (KEPPRA) 500 MG tablet TAKE 1 TABLET BY MOUTH TWO  TIMES DAILY 180 tablet 1    atorvastatin (LIPITOR) 40 MG tablet Take 1 tablet by mouth nightly 90 tablet 0    folic acid (FOLVITE) 1 MG tablet Take 1 tablet by mouth daily 90 tablet 1    ibuprofen (IBUPROFEN 100 JESSICA STRENGTH) 100 MG chewable tablet Take 100 mg by mouth every 8 hours as needed for Pain      aspirin 81 MG chewable tablet Take 1 tablet by mouth daily 90 tablet 0       I have reviewed all pertinent PMHx, PSHx, FamHx, SocialHx, medications, and allergies and updated history as appropriate. OBJECTIVE    VS: /70   Pulse 71   Temp 97.1 °F (36.2 °C) (Temporal)   Resp 18   Ht 5' 2\" (1.575 m)   Wt 107 lb (48.5 kg)   LMP  (LMP Unknown)   SpO2 98%   Breastfeeding No   BMI 19.57 kg/m²   Physical Exam  Constitutional:       General: She is not in acute distress. Appearance: She is well-developed. She is not diaphoretic. HENT:      Head: Normocephalic and atraumatic. Ears:      Comments: Partial b/l cerumen impacting resolved with irrigation  Eyes:      Conjunctiva/sclera: Conjunctivae normal.      Pupils: Pupils are equal, round, and reactive to light. Neck:      Musculoskeletal: Normal range of motion and neck supple. Cardiovascular:      Rate and Rhythm: Normal rate and regular rhythm. Pulmonary:      Effort: Pulmonary effort is normal.      Breath sounds: Normal breath sounds. Abdominal:      General: Bowel sounds are normal. There is no distension. Palpations: Abdomen is soft. Tenderness: There is no abdominal tenderness. Hernia: No hernia is present. Skin:     General: Skin is warm and dry. Comments: Papular rash lower extremities with flaking dry skin   Neurological:      Mental Status: She is alert and oriented to person, place, and time. ASSESSMENT/PLAN:  1. Dermatitis  Encourage bid moisture regimen with Cetaphil lotion. Also Kenalog topical as prescribed. Unspecified trigger.  - triamcinolone (KENALOG) 0.025 % ointment; Apply topically 2 times daily x 10 days  Dispense: 80 g; Refill: 0    2. Diverticulitis  #2/3. Drain out. Recent CT reviewed with patient and daughter in-law. Small residual pericolonic abscess persist. Recently completed antibiotic course per ID. Patient totally asymptomatic. F/u with ID as recommended. F/u with gen surgery about timeline/need for repeat colonoscopy. 3. Abdominal wall abscess    4. Impacted cerumen, unspecified laterality  Resolved after irrigation      I have reviewed my findings and recommendations with Dina Moffett MD  1/26/2021 4:07 PM     Counseled regarding above diagnosis, including possible risks and complications, especially if left uncontrolled. Patient counseled on red flag symptoms and if they occur to go to the ED. Discussed medications risk/benefits and possible side effects and alternatives to treatment. Patient and/or guardian verbalizes understanding, agrees, feels comfortable with and wishes to proceed with above treatment plan. Advised patient regarding importance of keeping up with recommended health maintenance and to schedule as soon as possible if overdue, as this is important in assessing for undiagnosed pathology, especially cancer, as well as protecting against potentially harmful/life threatening disease. Patient and/or guardian verbalizes understanding and agrees with above counseling, assessment and plan. All questions answered. Please note this report has been partially produced using speech recognition software  and may contain errors related to that system including grammar, punctuation and spelling as well as words and phrases that may seem inappropriate. If there are questions or concerns please feel free to contact me to clarify.

## 2021-01-29 DIAGNOSIS — E78.2 MIXED HYPERLIPIDEMIA: ICD-10-CM

## 2021-02-01 DIAGNOSIS — Z12.31 SCREENING MAMMOGRAM FOR HIGH-RISK PATIENT: Primary | ICD-10-CM

## 2021-02-01 RX ORDER — ATORVASTATIN CALCIUM 40 MG/1
TABLET, FILM COATED ORAL
Qty: 90 TABLET | Refills: 1 | Status: SHIPPED
Start: 2021-02-01 | End: 2021-04-08 | Stop reason: SDUPTHER

## 2021-02-03 ENCOUNTER — TELEPHONE (OUTPATIENT)
Dept: HEMATOLOGY | Age: 76
End: 2021-02-03

## 2021-02-03 NOTE — TELEPHONE ENCOUNTER
Patients Daughter in law Anamika Driscoll called the office today. She stated that they wanted to move the patients March appt back until June. She stated her mother in law is drowining with hospital and doctors bills and co-pays so at this time they need to push her follow up back. I did explain to the daughter in law that if she doesn't have the co-pay for dr Ramesh Miranda at the time of her visit that we can bill the patient. I also explained to the patient that we do offer financial assistance through [employer]. I asked her if she would like me to mail her a financial aid paper and she said yes. So I put that in the mail this afternoon. I also told the daughter in law that when she does receive any statements from [employer] to look on the back of them and they will also explain how the patient or herself can fill them out and mail them in to see if she can get any financial help. I gave her the number to the hospital and told her if she has any further questions to call and ask for [place] financial aid dept.  She was very thankful for my advice and I told her we will set the patient up for her scans but in the meantime if she needs us to just call our office  Electronically signed by Sasha Saldana MA on 2/3/2021 at 12:17 PM

## 2021-02-04 ENCOUNTER — HOSPITAL ENCOUNTER (OUTPATIENT)
Dept: MAMMOGRAPHY | Age: 76
Discharge: HOME | End: 2021-02-06
Payer: MEDICARE

## 2021-02-04 DIAGNOSIS — Z12.31 SCREENING MAMMOGRAM FOR HIGH-RISK PATIENT: ICD-10-CM

## 2021-02-04 PROCEDURE — 77063 BREAST TOMOSYNTHESIS BI: CPT

## 2021-02-05 ENCOUNTER — HOSPITAL ENCOUNTER (OUTPATIENT)
Dept: MAMMOGRAPHY | Age: 76
Discharge: HOME | End: 2021-02-07
Payer: MEDICARE

## 2021-02-05 DIAGNOSIS — Z78.0 POSTMENOPAUSAL: ICD-10-CM

## 2021-02-05 PROCEDURE — 77080 DXA BONE DENSITY AXIAL: CPT

## 2021-02-09 ENCOUNTER — TELEPHONE (OUTPATIENT)
Dept: GENERAL RADIOLOGY | Age: 76
End: 2021-02-09

## 2021-02-09 DIAGNOSIS — R92.8 ABNORMAL MAMMOGRAM: Primary | ICD-10-CM

## 2021-02-09 NOTE — TELEPHONE ENCOUNTER
Call to patient in reference to her mammogram performed on the [facility] on February 4, 2021. Instructed patient that the radiologist has recommended some additional breast imaging, in order to make a final determination/result. Verbalizes understanding and is agreeable to proceed.        Andrea Clarke RN, BSN, [address]

## 2021-02-11 ENCOUNTER — OFFICE VISIT (OUTPATIENT)
Dept: FAMILY MEDICINE CLINIC | Age: 76
End: 2021-02-11
Payer: MEDICARE

## 2021-02-11 VITALS
WEIGHT: 107 LBS | BODY MASS INDEX: 19.69 KG/M2 | RESPIRATION RATE: 16 BRPM | HEART RATE: 89 BPM | SYSTOLIC BLOOD PRESSURE: 122 MMHG | OXYGEN SATURATION: 98 % | TEMPERATURE: 98.8 F | DIASTOLIC BLOOD PRESSURE: 84 MMHG | HEIGHT: 62 IN

## 2021-02-11 DIAGNOSIS — K86.9 LESION OF PANCREAS: ICD-10-CM

## 2021-02-11 DIAGNOSIS — M81.8 AGE-RELATED OSTEOPOROSIS WITHOUT FRACTURE: Primary | ICD-10-CM

## 2021-02-11 DIAGNOSIS — K57.32 DIVERTICULITIS OF COLON: ICD-10-CM

## 2021-02-11 PROCEDURE — 1090F PRES/ABSN URINE INCON ASSESS: CPT | Performed by: FAMILY MEDICINE

## 2021-02-11 PROCEDURE — 1123F ACP DISCUSS/DSCN MKR DOCD: CPT | Performed by: FAMILY MEDICINE

## 2021-02-11 PROCEDURE — 1036F TOBACCO NON-USER: CPT | Performed by: FAMILY MEDICINE

## 2021-02-11 PROCEDURE — 99214 OFFICE O/P EST MOD 30 MIN: CPT | Performed by: FAMILY MEDICINE

## 2021-02-11 PROCEDURE — G8399 PT W/DXA RESULTS DOCUMENT: HCPCS | Performed by: FAMILY MEDICINE

## 2021-02-11 PROCEDURE — G8427 DOCREV CUR MEDS BY ELIG CLIN: HCPCS | Performed by: FAMILY MEDICINE

## 2021-02-11 PROCEDURE — 4040F PNEUMOC VAC/ADMIN/RCVD: CPT | Performed by: FAMILY MEDICINE

## 2021-02-11 PROCEDURE — G8484 FLU IMMUNIZE NO ADMIN: HCPCS | Performed by: FAMILY MEDICINE

## 2021-02-11 PROCEDURE — G8420 CALC BMI NORM PARAMETERS: HCPCS | Performed by: FAMILY MEDICINE

## 2021-02-11 PROCEDURE — 3017F COLORECTAL CA SCREEN DOC REV: CPT | Performed by: FAMILY MEDICINE

## 2021-02-11 NOTE — PROGRESS NOTES
[facility])  Family Medicine Outpatient        SUBJECTIVE:  CC: had concerns including Results (To review lab results. ). HPI:Cristiane Larry presented to the clinic for a routine visit. Gio Enriquez is a 70year old female presenting with her daughter in law to review recent labs. She reports doing well. She saw Infectious disease 2/2 to review her most recent CT scan which showed small remaining fluid collections from her abscess formation subsequent to her diverticulitis with perforation infection. She reports they are just monitoring at this time and no plans for further drainage or further antibiotics. She reports feeling good. She denies any complaints. Review of Systems   Constitutional: Negative for appetite change, fatigue and fever. Respiratory: Negative for cough, shortness of breath and wheezing. Cardiovascular: Negative for chest pain and palpitations. Gastrointestinal: Negative for abdominal pain, constipation, diarrhea, nausea and vomiting. Outpatient Medications Marked as Taking for the 2/11/21 encounter (Office Visit) with Kevin Franks MD   Medication Sig Dispense Refill    atorvastatin (LIPITOR) 40 MG tablet TAKE 1 TABLET BY MOUTH AT  NIGHT 90 tablet 1    ondansetron (ZOFRAN) 4 MG tablet Take 1 tablet by mouth every 12 hours as needed for Nausea or Vomiting 15 tablet 0    lamoTRIgine (LAMICTAL) 100 MG tablet Take 1 tablet by mouth 2 times daily 60 tablet 5    levETIRAcetam (KEPPRA) 500 MG tablet TAKE 1 TABLET BY MOUTH TWO  TIMES DAILY 768 tablet 1    folic acid (FOLVITE) 1 MG tablet Take 1 tablet by mouth daily 90 tablet 1    ibuprofen (IBUPROFEN 100 JESSICA STRENGTH) 100 MG chewable tablet Take 100 mg by mouth every 8 hours as needed for Pain      aspirin 81 MG chewable tablet Take 1 tablet by mouth daily 90 tablet 0       I have reviewed all pertinent PMHx, PSHx, FamHx, SocialHx, medications, and allergies and updated history as appropriate.     OBJECTIVE    VS: BP 122/84   Pulse 89   Temp 98.8 °F (37.1 °C) (Temporal)   Resp 16   Ht 5' 2\" (1.575 m)   Wt 107 lb (48.5 kg)   LMP  (LMP Unknown)   SpO2 98%   BMI 19.57 kg/m²   Physical Exam  Constitutional:       General: She is not in acute distress. Appearance: She is well-developed. She is not diaphoretic. HENT:      Head: Normocephalic and atraumatic. Eyes:      Conjunctiva/sclera: Conjunctivae normal.      Pupils: Pupils are equal, round, and reactive to light. Neck:      Musculoskeletal: Normal range of motion and neck supple. Cardiovascular:      Rate and Rhythm: Normal rate and regular rhythm. Pulmonary:      Effort: Pulmonary effort is normal.      Breath sounds: Normal breath sounds. Abdominal:      General: Bowel sounds are normal. There is no distension. Palpations: Abdomen is soft. Tenderness: There is no abdominal tenderness. Hernia: No hernia is present. Skin:     General: Skin is warm and dry. Neurological:      Mental Status: She is alert and oriented to person, place, and time. ASSESSMENT/PLAN:  1. Age-related osteoporosis without fracture  Reviewed recent dexa imaging. Discussed possible treatment options with patient and daughter in law and they would like to try the once yearly Reclast infusion. Risk/benefits discussed and all questions answered. Ordered Reclast at infusion center at this time. Continue to monitor. Repeat Dexa in 2 years. 2. Diverticulitis of colon  With abscess formation s/p drain and antibiotic course. Recent CT showed small residual fluid collection. Continue to f/u with General surgery and ID as recommended. No concerns today. 3. Lesion of pancreas  Following with Dr. Neri Mathews. Repeat imaging to be done in March.       I have reviewed my findings and recommendations with Orlin Kang MD  2/15/2021 12:24 PM     Counseled regarding above diagnosis, including possible risks and complications, especially if left uncontrolled. Patient counseled on red flag symptoms and if they occur to go to the ED. Discussed medications risk/benefits and possible side effects and alternatives to treatment. Patient and/or guardian verbalizes understanding, agrees, feels comfortable with and wishes to proceed with above treatment plan. Advised patient regarding importance of keeping up with recommended health maintenance and to schedule as soon as possible if overdue, as this is important in assessing for undiagnosed pathology, especially cancer, as well as protecting against potentially harmful/life threatening disease. Patient and/or guardian verbalizes understanding and agrees with above counseling, assessment and plan. All questions answered. Please note this report has been partially produced using speech recognition software  and may contain errors related to that system including grammar, punctuation and spelling as well as words and phrases that may seem inappropriate. If there are questions or concerns please feel free to contact me to clarify.

## 2021-02-12 DIAGNOSIS — M81.0 POSTMENOPAUSAL OSTEOPOROSIS: ICD-10-CM

## 2021-02-12 RX ORDER — ZOLEDRONIC ACID 5 MG/100ML
5 INJECTION, SOLUTION INTRAVENOUS ONCE
Status: CANCELLED | OUTPATIENT
Start: 2021-02-12 | End: 2021-02-12

## 2021-02-12 RX ORDER — SODIUM CHLORIDE 0.9 % (FLUSH) 0.9 %
10 SYRINGE (ML) INJECTION PRN
Status: CANCELLED | OUTPATIENT
Start: 2021-02-12

## 2021-02-12 RX ORDER — SODIUM CHLORIDE 9 MG/ML
INJECTION, SOLUTION INTRAVENOUS ONCE
Status: CANCELLED | OUTPATIENT
Start: 2021-02-12 | End: 2021-02-12

## 2021-02-15 ASSESSMENT — ENCOUNTER SYMPTOMS
COUGH: 0
CONSTIPATION: 0
DIARRHEA: 0
NAUSEA: 0
SHORTNESS OF BREATH: 0
VOMITING: 0
ABDOMINAL PAIN: 0
WHEEZING: 0

## 2021-02-16 ENCOUNTER — HOSPITAL ENCOUNTER (OUTPATIENT)
Dept: GENERAL RADIOLOGY | Age: 76
Discharge: HOME | End: 2021-02-18
Payer: MEDICARE

## 2021-02-16 DIAGNOSIS — R92.8 ABNORMAL MAMMOGRAM OF LEFT BREAST: ICD-10-CM

## 2021-02-16 DIAGNOSIS — R92.8 ABNORMAL MAMMOGRAM: ICD-10-CM

## 2021-02-16 DIAGNOSIS — R92.1 CALCIFICATION OF LEFT BREAST ON MAMMOGRAPHY: ICD-10-CM

## 2021-02-16 DIAGNOSIS — R92.8 ABNORMAL MAMMOGRAM OF LEFT BREAST: Primary | ICD-10-CM

## 2021-02-16 PROCEDURE — 88305 TISSUE EXAM BY PATHOLOGIST: CPT

## 2021-02-16 PROCEDURE — 2720000010 MAM STEREO BREAST BX W LOC DEVICE 1ST LESION LEFT

## 2021-02-16 PROCEDURE — 76098 X-RAY EXAM SURGICAL SPECIMEN: CPT

## 2021-02-16 PROCEDURE — G0279 TOMOSYNTHESIS, MAMMO: HCPCS

## 2021-02-16 PROCEDURE — 2500000003 HC RX 250 WO HCPCS

## 2021-02-16 PROCEDURE — 77065 DX MAMMO INCL CAD UNI: CPT

## 2021-02-16 NOTE — PROGRESS NOTES
Met with patient and her daughter in [facility] prior to her breast biopsy. Instructed on breast biopsy procedure. She indicates that she takes 81 mg Aspirin daily. I remained with her during the biopsy to provide instruction and emotional support. She tolerated procedure well. Upon questioning regarding results notification, patient indicates that she would like to receive breast biopsy results by phone via the breast navigator. Instructed that results will be available in approximately 3-5 business days. Instructed that her physician will also be notified of results. Provided with folder containing my contact information, monthly breast self exam card, and post biopsy discharge instructions. Instructed to call me if she has any questions or concerns about her biopsy. Verbalizes understanding. Post biopsy mammogram indicates that tissue marker may have migrated slightly from biopsy site.   SHONA Quiroz, OCN, CN-BN

## 2021-02-23 ENCOUNTER — HOSPITAL ENCOUNTER (OUTPATIENT)
Dept: INFUSION THERAPY | Age: 76
Setting detail: INFUSION SERIES
Discharge: HOME | End: 2021-02-23
Payer: MEDICARE

## 2021-02-23 VITALS
SYSTOLIC BLOOD PRESSURE: 139 MMHG | RESPIRATION RATE: 16 BRPM | OXYGEN SATURATION: 95 % | DIASTOLIC BLOOD PRESSURE: 83 MMHG | TEMPERATURE: 98.3 F | HEART RATE: 73 BPM

## 2021-02-23 DIAGNOSIS — M81.0 POSTMENOPAUSAL OSTEOPOROSIS: Primary | ICD-10-CM

## 2021-02-23 PROCEDURE — 96365 THER/PROPH/DIAG IV INF INIT: CPT

## 2021-02-23 PROCEDURE — 6360000002 HC RX W HCPCS: Performed by: FAMILY MEDICINE

## 2021-02-23 PROCEDURE — 2580000003 HC RX 258: Performed by: FAMILY MEDICINE

## 2021-02-23 RX ORDER — ZOLEDRONIC ACID 5 MG/100ML
5 INJECTION, SOLUTION INTRAVENOUS ONCE
Status: CANCELLED | OUTPATIENT
Start: 2021-02-23 | End: 2021-02-23

## 2021-02-23 RX ORDER — SODIUM CHLORIDE 9 MG/ML
INJECTION, SOLUTION INTRAVENOUS ONCE
Status: DISCONTINUED | OUTPATIENT
Start: 2021-02-23 | End: 2021-02-24 | Stop reason: HOSPADM

## 2021-02-23 RX ORDER — SODIUM CHLORIDE 9 MG/ML
INJECTION, SOLUTION INTRAVENOUS ONCE
Status: CANCELLED | OUTPATIENT
Start: 2021-02-23 | End: 2021-02-23

## 2021-02-23 RX ORDER — SODIUM CHLORIDE 0.9 % (FLUSH) 0.9 %
10 SYRINGE (ML) INJECTION PRN
Status: DISCONTINUED | OUTPATIENT
Start: 2021-02-23 | End: 2021-02-24 | Stop reason: HOSPADM

## 2021-02-23 RX ORDER — SODIUM CHLORIDE 0.9 % (FLUSH) 0.9 %
10 SYRINGE (ML) INJECTION PRN
Status: CANCELLED | OUTPATIENT
Start: 2021-02-23

## 2021-02-23 RX ORDER — ZOLEDRONIC ACID 5 MG/100ML
5 INJECTION, SOLUTION INTRAVENOUS ONCE
Status: COMPLETED | OUTPATIENT
Start: 2021-02-23 | End: 2021-02-23

## 2021-02-23 RX ADMIN — SODIUM CHLORIDE, PRESERVATIVE FREE 10 ML: 5 INJECTION INTRAVENOUS at 15:00

## 2021-02-23 RX ADMIN — SODIUM CHLORIDE, PRESERVATIVE FREE 10 ML: 5 INJECTION INTRAVENOUS at 15:33

## 2021-02-23 RX ADMIN — ZOLEDRONIC ACID 5 MG: 5 INJECTION, SOLUTION INTRAVENOUS at 15:14

## 2021-02-24 ENCOUNTER — TELEPHONE (OUTPATIENT)
Dept: GENERAL RADIOLOGY | Age: 76
End: 2021-02-24

## 2021-02-24 NOTE — TELEPHONE ENCOUNTER
I spoke with Lizz Chung on 2/19/21 regarding her left breast biopsy results (benign) and  's recommendation for stereotactic biopsy of an additional cluster of micro-calcifications (11 o'clock position) in her left breast. She inquired as to whether there are other options. Instructed that if she does not want a biopsy, she could elect to have follow up mammogram in 6 months to re-evaluate the calcifications. She indicated that she wanted to think about it. Her daughter in law- Ruben Vizcaino called today. States she and Lizz Chung have talked it over, and Lizz Chung does not want to have another breast biopsy at this time. She is agreeable to 6 month follow up mammogram on the left breast.  Dr. Arcola Severin notified. Instructed that we will send request for follow up mammogram order to Dr. Jesse Daniel, and will call Lizz Chung to schedule the mammogram once the order is received.   Electronically signed by Layton Julio RN, BSN on 2/24/2021 at 1:19 PM

## 2021-03-23 ENCOUNTER — TELEPHONE (OUTPATIENT)
Dept: FAMILY MEDICINE CLINIC | Age: 76
End: 2021-03-23

## 2021-03-23 NOTE — TELEPHONE ENCOUNTER
Pt called in she has to go to the dentist for a cleaning and she said that her teeth are very sensitive, pt wanted to know if doctor can give her something to help her with going to the dentist. Pt said she spoke with dentist and they will not give her anything to help with a cleaning.

## 2021-03-23 NOTE — TELEPHONE ENCOUNTER
Left message for patient that she would need to obtain something from her dentist.  Electronically signed by Rajendra Ng on 3/23/2021 at 2:09 PM

## 2021-04-08 ENCOUNTER — OFFICE VISIT (OUTPATIENT)
Dept: FAMILY MEDICINE CLINIC | Age: 76
End: 2021-04-08
Payer: MEDICARE

## 2021-04-08 ENCOUNTER — HOSPITAL ENCOUNTER (OUTPATIENT)
Age: 76
Discharge: HOME | End: 2021-04-08
Payer: MEDICARE

## 2021-04-08 VITALS
OXYGEN SATURATION: 97 % | BODY MASS INDEX: 19.32 KG/M2 | HEART RATE: 72 BPM | WEIGHT: 105 LBS | RESPIRATION RATE: 16 BRPM | SYSTOLIC BLOOD PRESSURE: 122 MMHG | HEIGHT: 62 IN | DIASTOLIC BLOOD PRESSURE: 80 MMHG | TEMPERATURE: 97.8 F

## 2021-04-08 DIAGNOSIS — Z86.73 HISTORY OF CVA (CEREBROVASCULAR ACCIDENT): ICD-10-CM

## 2021-04-08 DIAGNOSIS — E78.2 MIXED HYPERLIPIDEMIA: ICD-10-CM

## 2021-04-08 DIAGNOSIS — Z86.018 HISTORY OF MENINGIOMA: ICD-10-CM

## 2021-04-08 DIAGNOSIS — Z01.818 PREOPERATIVE CLEARANCE: ICD-10-CM

## 2021-04-08 DIAGNOSIS — Z01.818 PREOPERATIVE CLEARANCE: Primary | ICD-10-CM

## 2021-04-08 LAB
ALBUMIN SERPL-MCNC: 4.3 G/DL (ref 3.5–5.2)
ALP BLD-CCNC: 137 U/L (ref 35–104)
ALT SERPL-CCNC: 12 U/L (ref 0–32)
ANION GAP SERPL CALCULATED.3IONS-SCNC: 10 MMOL/L (ref 7–16)
AST SERPL-CCNC: 18 U/L (ref 0–31)
BASOPHILS ABSOLUTE: 0.02 E9/L (ref 0–0.2)
BASOPHILS RELATIVE PERCENT: 0.4 % (ref 0–2)
BILIRUB SERPL-MCNC: 0.3 MG/DL (ref 0–1.2)
BUN BLDV-MCNC: 20 MG/DL (ref 8–23)
CALCIUM SERPL-MCNC: 9.5 MG/DL (ref 8.6–10.2)
CHLORIDE BLD-SCNC: 101 MMOL/L (ref 98–107)
CO2: 25 MMOL/L (ref 22–29)
CREAT SERPL-MCNC: 0.7 MG/DL (ref 0.5–1)
EOSINOPHILS ABSOLUTE: 0.06 E9/L (ref 0.05–0.5)
EOSINOPHILS RELATIVE PERCENT: 1.1 % (ref 0–6)
GFR AFRICAN AMERICAN: >60
GFR NON-AFRICAN AMERICAN: >60 ML/MIN/1.73
GLUCOSE BLD-MCNC: 90 MG/DL (ref 74–99)
HCT VFR BLD CALC: 40.1 % (ref 34–48)
HEMOGLOBIN: 12.6 G/DL (ref 11.5–15.5)
IMMATURE GRANULOCYTES #: 0.01 E9/L
IMMATURE GRANULOCYTES %: 0.2 % (ref 0–5)
LYMPHOCYTES ABSOLUTE: 1.79 E9/L (ref 1.5–4)
LYMPHOCYTES RELATIVE PERCENT: 32.1 % (ref 20–42)
MCH RBC QN AUTO: 27.4 PG (ref 26–35)
MCHC RBC AUTO-ENTMCNC: 31.4 % (ref 32–34.5)
MCV RBC AUTO: 87.2 FL (ref 80–99.9)
MONOCYTES ABSOLUTE: 0.44 E9/L (ref 0.1–0.95)
MONOCYTES RELATIVE PERCENT: 7.9 % (ref 2–12)
NEUTROPHILS ABSOLUTE: 3.26 E9/L (ref 1.8–7.3)
NEUTROPHILS RELATIVE PERCENT: 58.3 % (ref 43–80)
PDW BLD-RTO: 14.4 FL (ref 11.5–15)
PLATELET # BLD: 302 E9/L (ref 130–450)
PMV BLD AUTO: 8.6 FL (ref 7–12)
POTASSIUM SERPL-SCNC: 4 MMOL/L (ref 3.5–5)
RBC # BLD: 4.6 E12/L (ref 3.5–5.5)
SODIUM BLD-SCNC: 136 MMOL/L (ref 132–146)
TOTAL PROTEIN: 8.1 G/DL (ref 6.4–8.3)
WBC # BLD: 5.6 E9/L (ref 4.5–11.5)

## 2021-04-08 PROCEDURE — 4040F PNEUMOC VAC/ADMIN/RCVD: CPT | Performed by: FAMILY MEDICINE

## 2021-04-08 PROCEDURE — 80053 COMPREHEN METABOLIC PANEL: CPT

## 2021-04-08 PROCEDURE — 1036F TOBACCO NON-USER: CPT | Performed by: FAMILY MEDICINE

## 2021-04-08 PROCEDURE — G8399 PT W/DXA RESULTS DOCUMENT: HCPCS | Performed by: FAMILY MEDICINE

## 2021-04-08 PROCEDURE — 85025 COMPLETE CBC W/AUTO DIFF WBC: CPT

## 2021-04-08 PROCEDURE — 99214 OFFICE O/P EST MOD 30 MIN: CPT | Performed by: FAMILY MEDICINE

## 2021-04-08 PROCEDURE — G8420 CALC BMI NORM PARAMETERS: HCPCS | Performed by: FAMILY MEDICINE

## 2021-04-08 PROCEDURE — 3017F COLORECTAL CA SCREEN DOC REV: CPT | Performed by: FAMILY MEDICINE

## 2021-04-08 PROCEDURE — 1123F ACP DISCUSS/DSCN MKR DOCD: CPT | Performed by: FAMILY MEDICINE

## 2021-04-08 PROCEDURE — G8427 DOCREV CUR MEDS BY ELIG CLIN: HCPCS | Performed by: FAMILY MEDICINE

## 2021-04-08 PROCEDURE — 36415 COLL VENOUS BLD VENIPUNCTURE: CPT

## 2021-04-08 PROCEDURE — 1090F PRES/ABSN URINE INCON ASSESS: CPT | Performed by: FAMILY MEDICINE

## 2021-04-08 RX ORDER — ATORVASTATIN CALCIUM 40 MG/1
TABLET, FILM COATED ORAL
Qty: 90 TABLET | Refills: 1 | Status: SHIPPED
Start: 2021-04-08 | End: 2021-07-15 | Stop reason: SDUPTHER

## 2021-04-08 NOTE — PROGRESS NOTES
[facility])  Family Medicine Outpatient        SUBJECTIVE:  CC: had concerns including Pre-op Exam (Cataract surgery, Dr Luis Carlos Cárdenas, not scheduled yet.). HPI:Cristiane Larry presented to the clinic for a routine visit. Wendy Tyson is a 70year old female presenting to the office with her daughter-in-law today to discuss clearance for b/l cataract surgery. She reports doing well. She denies any acute concerns. EKG 12/15/20  Narrative & Impression    Normal sinus rhythm  Normal ECG  When compared with ECG of 28-NOV-2020 13:47,  Significant changes have occurred  Confirmed by Anu Riggins ([id number]) on 12/15/2020 2:19:25 PM         Review of Systems   Constitutional: Negative for appetite change, fatigue and fever. Respiratory: Negative for cough, shortness of breath and wheezing. Cardiovascular: Negative for chest pain and palpitations. Gastrointestinal: Negative for abdominal pain, constipation, diarrhea, nausea and vomiting. Neurological: Negative for dizziness, syncope and light-headedness. Outpatient Medications Marked as Taking for the 4/8/21 encounter (Office Visit) with Eric Hartman MD   Medication Sig Dispense Refill    atorvastatin (LIPITOR) 40 MG tablet TAKE 1 TABLET BY MOUTH AT  NIGHT 90 tablet 1    lamoTRIgine (LAMICTAL) 100 MG tablet Take 1 tablet by mouth 2 times daily 60 tablet 5    levETIRAcetam (KEPPRA) 500 MG tablet TAKE 1 TABLET BY MOUTH TWO  TIMES DAILY 621 tablet 1    folic acid (FOLVITE) 1 MG tablet Take 1 tablet by mouth daily 90 tablet 1    ibuprofen (IBUPROFEN 100 JESSICA STRENGTH) 100 MG chewable tablet Take 100 mg by mouth every 8 hours as needed for Pain      aspirin 81 MG chewable tablet Take 1 tablet by mouth daily 90 tablet 0       I have reviewed all pertinent PMHx, PSHx, FamHx, SocialHx, medications, and allergies and updated history as appropriate.     OBJECTIVE    VS: /80   Pulse 72   Temp 97.8 °F (36.6 °C)   Resp 16   Ht 5' 2\" (1.575 m)   Wt 105 lb (47.6 kg)   LMP  (LMP Unknown)   SpO2 97%   BMI 19.20 kg/m²   Physical Exam  Constitutional:       General: She is not in acute distress. Appearance: She is well-developed. She is not diaphoretic. HENT:      Head: Normocephalic and atraumatic. Eyes:      Conjunctiva/sclera: Conjunctivae normal.      Pupils: Pupils are equal, round, and reactive to light. Neck:      Musculoskeletal: Normal range of motion and neck supple. Cardiovascular:      Rate and Rhythm: Normal rate and regular rhythm. Pulmonary:      Effort: Pulmonary effort is normal.      Breath sounds: Normal breath sounds. Abdominal:      General: Bowel sounds are normal. There is no distension. Palpations: Abdomen is soft. Tenderness: There is no abdominal tenderness. Hernia: No hernia is present. Musculoskeletal: Normal range of motion. General: No swelling or tenderness. Skin:     General: Skin is warm and dry. Neurological:      Mental Status: She is alert and oriented to person, place, and time. Motor: No weakness. Gait: Gait normal.         ASSESSMENT/PLAN:  1. Preoperative clearance  Labs pending. >4 mets functional capacity. - CBC Auto Differential; Future  - Comprehensive Metabolic Panel; Future    2. Mixed hyperlipidemia  - atorvastatin (LIPITOR) 40 MG tablet; TAKE 1 TABLET BY MOUTH AT  NIGHT  Dispense: 90 tablet; Refill: 1    3. History of CVA (cerebrovascular accident)  2019. Continue daily baby Aspirin qd.     4. History of meningioma  With resection 2002 and associated with onset seizure disorder. No further seizures reported since resection. Continue on anti seizure regimen per Neurology. Addendum: Modified Yong's criteria with lab results shows patient is low risk for surgery. Ok to proceed with cataract surgery under twilight sedation.     I have reviewed my findings and recommendations with Marion Street MD  4/21/2021 10:46 AM     Counseled regarding above diagnosis, including possible risks and complications, especially if left uncontrolled. Patient counseled on red flag symptoms and if they occur to go to the ED. Discussed medications risk/benefits and possible side effects and alternatives to treatment. Patient and/or guardian verbalizes understanding, agrees, feels comfortable with and wishes to proceed with above treatment plan. Advised patient regarding importance of keeping up with recommended health maintenance and to schedule as soon as possible if overdue, as this is important in assessing for undiagnosed pathology, especially cancer, as well as protecting against potentially harmful/life threatening disease. Patient and/or guardian verbalizes understanding and agrees with above counseling, assessment and plan. All questions answered. Please note this report has been partially produced using speech recognition software  and may contain errors related to that system including grammar, punctuation and spelling as well as words and phrases that may seem inappropriate. If there are questions or concerns please feel free to contact me to clarify.

## 2021-04-12 ENCOUNTER — TELEPHONE (OUTPATIENT)
Dept: FAMILY MEDICINE CLINIC | Age: 76
End: 2021-04-12

## 2021-04-12 NOTE — TELEPHONE ENCOUNTER
Patient called to ask if she is cleared for surgery. If so please fax surgical clearance to [facility].   Fax number: [phone]

## 2021-04-12 NOTE — LETTER
[facility]  [address] [place] [id number]  Phone: [phone]  Fax: Gisselle Riggins MD     FAX:  [phone]        April 13, 2021     Patient: Bradley Lopez   YOB: 1945   Date of Visit: 4/12/2021       To Whom It May Concern: It is my medical opinion that Andrey Duncan is cleared to proceed with cataract surgery. If you have any questions or concerns, please don't hesitate to call.     Sincerely,        Carley Salamanca MD

## 2021-04-21 ASSESSMENT — ENCOUNTER SYMPTOMS
VOMITING: 0
ABDOMINAL PAIN: 0
DIARRHEA: 0
COUGH: 0
WHEEZING: 0
NAUSEA: 0
SHORTNESS OF BREATH: 0
CONSTIPATION: 0

## 2021-05-28 ENCOUNTER — TELEPHONE (OUTPATIENT)
Dept: FAMILY MEDICINE CLINIC | Age: 76
End: 2021-05-28

## 2021-05-28 NOTE — TELEPHONE ENCOUNTER
Please let patient know to trial Miralax 1 cap qd x 3 days with colace 100 mg/bid otc. If symptoms worsen with abdominal distension or pain, etc would recommend eval in [facility]/ed.  Can schedule an in office apt for next week to f/u

## 2021-05-28 NOTE — TELEPHONE ENCOUNTER
This MA attempted to return call to pt. No answer. This MA left message for pt requesting return call to office.     Electronically signed by Femi Fitch MA on 5/28/21 at 3:01 PM EDT

## 2021-05-28 NOTE — TELEPHONE ENCOUNTER
Patient called concerned about constipation she is having. States she spent several days in the hospital for diverticulitis recently and was told she should avoid constipation. She is asking if you can give her something to help get her bowels moving. Please advise.

## 2021-06-07 DIAGNOSIS — G40.909 SEIZURE DISORDER (HCC): ICD-10-CM

## 2021-06-07 RX ORDER — LEVETIRACETAM 500 MG/1
TABLET ORAL
Qty: 180 TABLET | Refills: 0 | Status: SHIPPED
Start: 2021-06-07 | End: 2021-06-25 | Stop reason: SDUPTHER

## 2021-06-07 NOTE — TELEPHONE ENCOUNTER
Patient called the clinical care line requesting medication refill. Chart reviewed and medication sent to the pharmacy.   Electronically signed by Shai Domínguez on 6/7/2021 at 2:50 PM

## 2021-06-24 DIAGNOSIS — G40.909 SEIZURE DISORDER (HCC): ICD-10-CM

## 2021-06-24 NOTE — TELEPHONE ENCOUNTER
Pts medication keppra 500mg was sent to [facility] but patient needs it sent to [facility] mail service. Can we change this?

## 2021-06-25 RX ORDER — LEVETIRACETAM 500 MG/1
TABLET ORAL
Qty: 180 TABLET | Refills: 0 | Status: SHIPPED
Start: 2021-06-25 | End: 2021-09-07

## 2021-07-14 ENCOUNTER — CARE COORDINATION (OUTPATIENT)
Dept: CARE COORDINATION | Age: 76
End: 2021-07-14

## 2021-07-15 DIAGNOSIS — E78.2 MIXED HYPERLIPIDEMIA: ICD-10-CM

## 2021-07-15 RX ORDER — ATORVASTATIN CALCIUM 40 MG/1
TABLET, FILM COATED ORAL
Qty: 90 TABLET | Refills: 0 | Status: SHIPPED
Start: 2021-07-15 | End: 2021-09-15

## 2021-07-23 ENCOUNTER — CARE COORDINATION (OUTPATIENT)
Dept: CARE COORDINATION | Age: 76
End: 2021-07-23

## 2021-09-05 DIAGNOSIS — G40.909 SEIZURE DISORDER (HCC): ICD-10-CM

## 2021-09-07 RX ORDER — LEVETIRACETAM 500 MG/1
TABLET ORAL
Qty: 180 TABLET | Refills: 0 | Status: SHIPPED
Start: 2021-09-07 | End: 2021-10-12 | Stop reason: SDUPTHER

## 2021-09-15 DIAGNOSIS — E78.2 MIXED HYPERLIPIDEMIA: ICD-10-CM

## 2021-09-15 RX ORDER — ATORVASTATIN CALCIUM 40 MG/1
TABLET, FILM COATED ORAL
Qty: 90 TABLET | Refills: 0 | Status: SHIPPED
Start: 2021-09-15 | End: 2021-10-16 | Stop reason: SDUPTHER

## 2021-10-08 DIAGNOSIS — I63.9 BRAINSTEM STROKE (HCC): ICD-10-CM

## 2021-10-08 DIAGNOSIS — Z98.890 HISTORY OF RESECTION OF MENINGIOMA: ICD-10-CM

## 2021-10-08 DIAGNOSIS — Z86.03 HISTORY OF RESECTION OF MENINGIOMA: ICD-10-CM

## 2021-10-08 DIAGNOSIS — G40.209 LOCALIZATION-RELATED (FOCAL) (PARTIAL) SYMPTOMATIC EPILEPSY AND EPILEPTIC SYNDROMES WITH COMPLEX PARTIAL SEIZURES, NOT INTRACTABLE, WITHOUT STATUS EPILEPTICUS (HCC): ICD-10-CM

## 2021-10-10 DIAGNOSIS — G40.909 SEIZURE DISORDER (HCC): ICD-10-CM

## 2021-10-10 RX ORDER — LAMOTRIGINE 100 MG/1
100 TABLET ORAL 2 TIMES DAILY
Qty: 180 TABLET | Refills: 0 | Status: SHIPPED
Start: 2021-10-10 | End: 2021-10-28 | Stop reason: SDUPTHER

## 2021-10-11 RX ORDER — LEVETIRACETAM 500 MG/1
TABLET ORAL
Qty: 180 TABLET | Refills: 0 | OUTPATIENT
Start: 2021-10-11

## 2021-10-12 DIAGNOSIS — E78.2 MIXED HYPERLIPIDEMIA: ICD-10-CM

## 2021-10-14 RX ORDER — ATORVASTATIN CALCIUM 40 MG/1
TABLET, FILM COATED ORAL
Qty: 90 TABLET | Refills: 0 | OUTPATIENT
Start: 2021-10-14

## 2021-10-15 ENCOUNTER — HOSPITAL ENCOUNTER (OUTPATIENT)
Age: 76
Discharge: HOME | End: 2021-10-15
Payer: MEDICARE

## 2021-10-15 DIAGNOSIS — E78.2 MIXED HYPERLIPIDEMIA: ICD-10-CM

## 2021-10-15 LAB
ALBUMIN SERPL-MCNC: 4.3 G/DL (ref 3.5–5.2)
ALP BLD-CCNC: 112 U/L (ref 35–104)
ALT SERPL-CCNC: 13 U/L (ref 0–32)
ANION GAP SERPL CALCULATED.3IONS-SCNC: 9 MMOL/L (ref 7–16)
AST SERPL-CCNC: 18 U/L (ref 0–31)
BILIRUB SERPL-MCNC: 0.3 MG/DL (ref 0–1.2)
BUN BLDV-MCNC: 13 MG/DL (ref 6–23)
CALCIUM SERPL-MCNC: 9.7 MG/DL (ref 8.6–10.2)
CHLORIDE BLD-SCNC: 104 MMOL/L (ref 98–107)
CHOLESTEROL, TOTAL: 140 MG/DL (ref 0–199)
CO2: 28 MMOL/L (ref 22–29)
CREAT SERPL-MCNC: 0.8 MG/DL (ref 0.5–1)
GFR AFRICAN AMERICAN: >60
GFR NON-AFRICAN AMERICAN: >60 ML/MIN/1.73
GLUCOSE BLD-MCNC: 90 MG/DL (ref 74–99)
HCT VFR BLD CALC: 42.4 % (ref 34–48)
HDLC SERPL-MCNC: 55 MG/DL
HEMOGLOBIN: 13.6 G/DL (ref 11.5–15.5)
LDL CHOLESTEROL CALCULATED: 69 MG/DL (ref 0–99)
MCH RBC QN AUTO: 28.7 PG (ref 26–35)
MCHC RBC AUTO-ENTMCNC: 32.1 % (ref 32–34.5)
MCV RBC AUTO: 89.5 FL (ref 80–99.9)
PDW BLD-RTO: 14.3 FL (ref 11.5–15)
PLATELET # BLD: 296 E9/L (ref 130–450)
PMV BLD AUTO: 8.5 FL (ref 7–12)
POTASSIUM SERPL-SCNC: 4.3 MMOL/L (ref 3.5–5)
RBC # BLD: 4.74 E12/L (ref 3.5–5.5)
SODIUM BLD-SCNC: 141 MMOL/L (ref 132–146)
TOTAL PROTEIN: 7.3 G/DL (ref 6.4–8.3)
TRIGL SERPL-MCNC: 78 MG/DL (ref 0–149)
VLDLC SERPL CALC-MCNC: 16 MG/DL
WBC # BLD: 4.9 E9/L (ref 4.5–11.5)

## 2021-10-15 PROCEDURE — 36415 COLL VENOUS BLD VENIPUNCTURE: CPT

## 2021-10-15 PROCEDURE — 85027 COMPLETE CBC AUTOMATED: CPT

## 2021-10-15 PROCEDURE — 80061 LIPID PANEL: CPT

## 2021-10-15 PROCEDURE — 80053 COMPREHEN METABOLIC PANEL: CPT

## 2021-10-16 DIAGNOSIS — G40.909 SEIZURE DISORDER (HCC): ICD-10-CM

## 2021-10-16 DIAGNOSIS — E78.2 MIXED HYPERLIPIDEMIA: ICD-10-CM

## 2021-10-18 RX ORDER — LEVETIRACETAM 500 MG/1
TABLET ORAL
Qty: 60 TABLET | Refills: 1 | OUTPATIENT
Start: 2021-10-18

## 2021-10-18 RX ORDER — ATORVASTATIN CALCIUM 40 MG/1
TABLET, FILM COATED ORAL
Qty: 90 TABLET | Refills: 0 | Status: SHIPPED
Start: 2021-10-18 | End: 2021-10-28 | Stop reason: SDUPTHER

## 2021-10-28 ENCOUNTER — OFFICE VISIT (OUTPATIENT)
Dept: FAMILY MEDICINE CLINIC | Age: 76
End: 2021-10-28
Payer: MEDICARE

## 2021-10-28 VITALS
HEART RATE: 63 BPM | RESPIRATION RATE: 18 BRPM | TEMPERATURE: 98 F | WEIGHT: 120 LBS | SYSTOLIC BLOOD PRESSURE: 118 MMHG | BODY MASS INDEX: 22.08 KG/M2 | DIASTOLIC BLOOD PRESSURE: 74 MMHG | HEIGHT: 62 IN | OXYGEN SATURATION: 98 %

## 2021-10-28 DIAGNOSIS — Z98.890 HISTORY OF RESECTION OF MENINGIOMA: ICD-10-CM

## 2021-10-28 DIAGNOSIS — Z86.03 HISTORY OF RESECTION OF MENINGIOMA: ICD-10-CM

## 2021-10-28 DIAGNOSIS — E78.2 MIXED HYPERLIPIDEMIA: ICD-10-CM

## 2021-10-28 DIAGNOSIS — G40.909 SEIZURE DISORDER (HCC): ICD-10-CM

## 2021-10-28 PROCEDURE — 1036F TOBACCO NON-USER: CPT | Performed by: FAMILY MEDICINE

## 2021-10-28 PROCEDURE — G8484 FLU IMMUNIZE NO ADMIN: HCPCS | Performed by: FAMILY MEDICINE

## 2021-10-28 PROCEDURE — 1123F ACP DISCUSS/DSCN MKR DOCD: CPT | Performed by: FAMILY MEDICINE

## 2021-10-28 PROCEDURE — 4040F PNEUMOC VAC/ADMIN/RCVD: CPT | Performed by: FAMILY MEDICINE

## 2021-10-28 PROCEDURE — G8427 DOCREV CUR MEDS BY ELIG CLIN: HCPCS | Performed by: FAMILY MEDICINE

## 2021-10-28 PROCEDURE — G8420 CALC BMI NORM PARAMETERS: HCPCS | Performed by: FAMILY MEDICINE

## 2021-10-28 PROCEDURE — 99213 OFFICE O/P EST LOW 20 MIN: CPT | Performed by: FAMILY MEDICINE

## 2021-10-28 PROCEDURE — G8399 PT W/DXA RESULTS DOCUMENT: HCPCS | Performed by: FAMILY MEDICINE

## 2021-10-28 PROCEDURE — 1090F PRES/ABSN URINE INCON ASSESS: CPT | Performed by: FAMILY MEDICINE

## 2021-10-28 PROCEDURE — 3017F COLORECTAL CA SCREEN DOC REV: CPT | Performed by: FAMILY MEDICINE

## 2021-10-28 RX ORDER — ATORVASTATIN CALCIUM 40 MG/1
TABLET, FILM COATED ORAL
Qty: 90 TABLET | Refills: 1 | Status: SHIPPED
Start: 2021-10-28 | End: 2022-03-08 | Stop reason: SDUPTHER

## 2021-10-28 RX ORDER — LEVETIRACETAM 500 MG/1
TABLET ORAL
Qty: 180 TABLET | Refills: 1 | Status: SHIPPED
Start: 2021-10-28 | End: 2022-03-09 | Stop reason: SDUPTHER

## 2021-10-28 RX ORDER — LAMOTRIGINE 100 MG/1
100 TABLET ORAL 2 TIMES DAILY
Qty: 180 TABLET | Refills: 1 | Status: SHIPPED
Start: 2021-10-28 | End: 2022-03-08 | Stop reason: SDUPTHER

## 2021-10-28 SDOH — ECONOMIC STABILITY: FOOD INSECURITY: WITHIN THE PAST 12 MONTHS, YOU WORRIED THAT YOUR FOOD WOULD RUN OUT BEFORE YOU GOT MONEY TO BUY MORE.: NEVER TRUE

## 2021-10-28 SDOH — ECONOMIC STABILITY: FOOD INSECURITY: WITHIN THE PAST 12 MONTHS, THE FOOD YOU BOUGHT JUST DIDN'T LAST AND YOU DIDN'T HAVE MONEY TO GET MORE.: NEVER TRUE

## 2021-10-28 ASSESSMENT — SOCIAL DETERMINANTS OF HEALTH (SDOH): HOW HARD IS IT FOR YOU TO PAY FOR THE VERY BASICS LIKE FOOD, HOUSING, MEDICAL CARE, AND HEATING?: NOT HARD AT ALL

## 2021-10-28 NOTE — PROGRESS NOTES
[facility])  Family Medicine Outpatient        SUBJECTIVE:  CC: had concerns including Discuss Labs (Pt here to review lab results), Medication Refill (Pharmacy confirmed - meds pended), and Flu Vaccine (Declined). HPI:  Zia Lopez is a female 78 y.o. presented to the clinic with her daughter-in-law for an established visit. She was last seen in April of this year. She reports doing well. She denies any acute concerns. Review of Systems   Constitutional: Negative for appetite change, fatigue and fever. Respiratory: Negative for cough, shortness of breath and wheezing. Cardiovascular: Negative for chest pain and palpitations. Gastrointestinal: Negative for abdominal pain, constipation, diarrhea, nausea and vomiting. Outpatient Medications Marked as Taking for the 10/28/21 encounter (Office Visit) with Faustino Centeno MD   Medication Sig Dispense Refill    atorvastatin (LIPITOR) 40 MG tablet TAKE 1 TABLET BY MOUTH AT  NIGHT 90 tablet 1    levETIRAcetam (KEPPRA) 500 MG tablet TAKE 1 TABLET BY MOUTH  TWICE DAILY 180 tablet 1    lamoTRIgine (LAMICTAL) 100 MG tablet Take 1 tablet by mouth 2 times daily 541 tablet 1    folic acid (FOLVITE) 1 MG tablet Take 1 tablet by mouth daily 90 tablet 1    aspirin 81 MG chewable tablet Take 1 tablet by mouth daily 90 tablet 0       I have reviewed all pertinent PMHx, PSHx, FamHx, SocialHx, medications, and allergies and updated history as appropriate. OBJECTIVE    VS: /74   Pulse 63   Temp 98 °F (36.7 °C) (Temporal)   Resp 18   Ht 5' 2\" (1.575 m)   Wt 120 lb (54.4 kg)   LMP  (LMP Unknown)   SpO2 98%   Breastfeeding No   BMI 21.95 kg/m²   Physical Exam  Constitutional:       General: She is not in acute distress. Appearance: She is well-developed. She is not diaphoretic. HENT:      Head: Normocephalic and atraumatic. Eyes:      Conjunctiva/sclera: Conjunctivae normal.      Pupils: Pupils are equal, round, and reactive to light. Cardiovascular:      Rate and Rhythm: Normal rate and regular rhythm. Pulmonary:      Effort: Pulmonary effort is normal.      Breath sounds: Normal breath sounds. Abdominal:      General: Bowel sounds are normal. There is no distension. Palpations: Abdomen is soft. Tenderness: There is no abdominal tenderness. Hernia: No hernia is present. Musculoskeletal:      Cervical back: Normal range of motion and neck supple. Skin:     General: Skin is warm and dry. Neurological:      Mental Status: She is alert and oriented to person, place, and time. ASSESSMENT/PLAN:  1. Mixed hyperlipidemia  - atorvastatin (LIPITOR) 40 MG tablet; TAKE 1 TABLET BY MOUTH AT  NIGHT  Dispense: 90 tablet; Refill: 1    2. Seizure disorder (HCC)  - levETIRAcetam (KEPPRA) 500 MG tablet; TAKE 1 TABLET BY MOUTH  TWICE DAILY  Dispense: 180 tablet; Refill: 1  - LAMOTRIGINE LEVEL; Future  - LEVETIRACETAM LEVEL; Future    3. History of resection of meningioma    I have reviewed my findings and recommendations with Althea Brothers MD  12/4/2021 11:43 AM  Return in about 3 months (around 1/28/2022) for medicare wellness, established f/u. Counseled regarding above diagnosis, including possible risks and complications, especially if left uncontrolled. Patient counseled on red flag symptoms and if they occur to go to the ED. Discussed medications risk/benefits and possible side effects and alternatives to treatment. Patient and/or guardian verbalizes understanding, agrees, feels comfortable with and wishes to proceed with above treatment plan. Advised patient regarding importance of keeping up with recommended health maintenance and to schedule as soon as possible if overdue, as this is important in assessing for undiagnosed pathology, especially cancer, as well as protecting against potentially harmful/life threatening disease.        Patient and/or guardian verbalizes understanding and agrees with above counseling, assessment and plan. All questions answered. Please note this report has been partially produced using speech recognition software  and may contain errors related to that system including grammar, punctuation and spelling as well as words and phrases that may seem inappropriate. If there are questions or concerns please feel free to contact me to clarify.

## 2021-10-30 ENCOUNTER — HOSPITAL ENCOUNTER (OUTPATIENT)
Age: 76
Discharge: HOME | End: 2021-10-30
Payer: MEDICARE

## 2021-10-30 DIAGNOSIS — G40.909 SEIZURE DISORDER (HCC): ICD-10-CM

## 2021-10-30 PROCEDURE — 80175 DRUG SCREEN QUAN LAMOTRIGINE: CPT

## 2021-10-30 PROCEDURE — 80177 DRUG SCRN QUAN LEVETIRACETAM: CPT

## 2021-11-02 LAB
KEPPRA: 15 UG/ML (ref 12–46)
LAMOTRIGINE LEVEL: 7.3 UG/ML (ref 2.5–15)

## 2021-11-04 ENCOUNTER — OFFICE VISIT (OUTPATIENT)
Dept: NEUROLOGY | Age: 76
End: 2021-11-04
Payer: MEDICARE

## 2021-11-04 VITALS
SYSTOLIC BLOOD PRESSURE: 120 MMHG | HEIGHT: 62 IN | DIASTOLIC BLOOD PRESSURE: 80 MMHG | WEIGHT: 120 LBS | BODY MASS INDEX: 22.08 KG/M2

## 2021-11-04 DIAGNOSIS — Z98.890 HX OF RESECTION OF MENINGIOMA: ICD-10-CM

## 2021-11-04 DIAGNOSIS — G40.109 PARTIAL SEIZURE DISORDER (HCC): Primary | ICD-10-CM

## 2021-11-04 DIAGNOSIS — I69.354 HEMIPARESIS AFFECTING LEFT SIDE AS LATE EFFECT OF STROKE (HCC): ICD-10-CM

## 2021-11-04 DIAGNOSIS — Z86.03 HX OF RESECTION OF MENINGIOMA: ICD-10-CM

## 2021-11-04 PROCEDURE — 1036F TOBACCO NON-USER: CPT | Performed by: PSYCHIATRY & NEUROLOGY

## 2021-11-04 PROCEDURE — G8420 CALC BMI NORM PARAMETERS: HCPCS | Performed by: PSYCHIATRY & NEUROLOGY

## 2021-11-04 PROCEDURE — 4040F PNEUMOC VAC/ADMIN/RCVD: CPT | Performed by: PSYCHIATRY & NEUROLOGY

## 2021-11-04 PROCEDURE — 1123F ACP DISCUSS/DSCN MKR DOCD: CPT | Performed by: PSYCHIATRY & NEUROLOGY

## 2021-11-04 PROCEDURE — 3017F COLORECTAL CA SCREEN DOC REV: CPT | Performed by: PSYCHIATRY & NEUROLOGY

## 2021-11-04 PROCEDURE — 99214 OFFICE O/P EST MOD 30 MIN: CPT | Performed by: PSYCHIATRY & NEUROLOGY

## 2021-11-04 PROCEDURE — G8399 PT W/DXA RESULTS DOCUMENT: HCPCS | Performed by: PSYCHIATRY & NEUROLOGY

## 2021-11-04 PROCEDURE — 1090F PRES/ABSN URINE INCON ASSESS: CPT | Performed by: PSYCHIATRY & NEUROLOGY

## 2021-11-04 PROCEDURE — G8484 FLU IMMUNIZE NO ADMIN: HCPCS | Performed by: PSYCHIATRY & NEUROLOGY

## 2021-11-04 PROCEDURE — G8427 DOCREV CUR MEDS BY ELIG CLIN: HCPCS | Performed by: PSYCHIATRY & NEUROLOGY

## 2021-11-04 ASSESSMENT — ENCOUNTER SYMPTOMS
EYES NEGATIVE: 1
RESPIRATORY NEGATIVE: 1
GASTROINTESTINAL NEGATIVE: 1

## 2021-11-04 NOTE — PROGRESS NOTES
Neurology Progress Note, Follow-up:    Patient: Mich Amato  : 1945  Date: 21  Primary provider: Humberto Tobar MD     Re: Followup OV, s/p rt. pontine stroke, and seizure disorder. History of meningioma resection of brain. Dear Humberto Tobar MD:    She present for a f/u visit for hx of partial onset seizure disorder. Her daughter reports no recurrent seizures or confusional episodes. She is continuing Keppra 500 mg twice daily and lamotrigine 100 mg twice daily. She has experienced no adverse side effects such as dizziness, nausea, tremor, fatigue, or ataxia. She has mild residual motor weakness of the left arm related to prior right pontine stroke. Lab data: lamotrigine 10/30, 7.3 ug/ml and Keppra 15 ug/ml and 10/15 wnl. Please refer to prior telehealth Neurology visit of 2020 for additional information if needed. ROS, medication/allergy list: Reviewed, updated. Current Outpatient Medications   Medication Sig Dispense Refill    atorvastatin (LIPITOR) 40 MG tablet TAKE 1 TABLET BY MOUTH AT  NIGHT 90 tablet 1    levETIRAcetam (KEPPRA) 500 MG tablet TAKE 1 TABLET BY MOUTH  TWICE DAILY 180 tablet 1    lamoTRIgine (LAMICTAL) 100 MG tablet Take 1 tablet by mouth 2 times daily 564 tablet 1    folic acid (FOLVITE) 1 MG tablet Take 1 tablet by mouth daily 90 tablet 1    aspirin 81 MG chewable tablet Take 1 tablet by mouth daily 90 tablet 0     No current facility-administered medications for this visit.        No Known Allergies    Patient Active Problem List   Diagnosis    Right pontine stroke ([address]    Localization-related epilepsy ([address]    History of resection of meningioma    Elevated alkaline phosphatase level    Primary insomnia    Other fatigue    Serum calcium elevated    Vitamin D deficiency    Mixed hyperlipidemia    History of meningioma    Hoarseness    Hemiparesis affecting left side as late effect of stroke ([address]    Lesion of pancreas    Diverticulitis    Vertigo    Weakness    Pneumonia of left lower lobe due to infectious organism    Diverticulitis of colon    Pancreatic cyst    Abdominal wall abscess    Postmenopausal osteoporosis       Past Medical History:   Diagnosis Date    [facility]bral [facility] occlusion [facility]     Hemiparesis affecting left side as late effect of stroke ([facility] 12/4/2020    Hyperlipidemia     Localization-related epilepsy ([facility] 6/25/2019       Past Surgical History:   Procedure Laterality Date    BRAIN MENINGIOMA EXCISION      HYSTERECTOMY      HYSTERECTOMY, VAGINAL  2014    Total    SAVITA STEROTACTIC LOC BREAST BIOPSY LEFT Left 2/16/2021    SAVITA STEROTACTIC LOC BREAST BIOPSY LEFT 2/16/2021 [facility]       No family history on file. Social History     Socioeconomic History    Marital status:      Spouse name: Not on file    Number of children: Not on file    Years of education: Not on file    Highest education level: Not on file   Occupational History    Not on file   Tobacco Use    Smoking status: Passive Smoke Exposure - Never Smoker    Smokeless tobacco: Never Used    Tobacco comment: Pt's  was heavy smoker   Vaping Use    Vaping Use: Never used   Substance and Sexual Activity    Alcohol use: Never    Drug use: Never    Sexual activity: Not on file   Other Topics Concern    Not on file   Social History Narrative    Not on file     Social Determinants of Health     Financial Resource Strain: Low Risk     Difficulty of Paying Living Expenses: Not hard at all   Food Insecurity: No Food Insecurity    Worried About [address] in the Last Year: Never true    [facility] in the Last Year: Never true   Transportation Needs:     Lack of Transportation (Medical):      Lack of Transportation (Non-Medical):    Physical Activity:     Days of Exercise per Week:     Minutes of Exercise per Session:    Stress:     Feeling of Stress :    Social 1.  History of partial onset seizure disorder remaining clinically stable on Keppra and lamotrigine, status post meningioma resection of brain and history of right pontine ischemic stroke, clinically stable. 2.  She continues low-dose aspirin therapy daily. 3.  Follow-up in the neurology clinic on an annual basis. Sincerely,      Amy Jefferson MD    Please note this report has been partially produced using speech recognition software and may cause contain errors related to that system including grammar, punctuation and spelling or words and phrases that may not seem appropriate. If there are questions or concerns please feel free to contact me to clarify.

## 2021-12-04 ASSESSMENT — ENCOUNTER SYMPTOMS
ABDOMINAL PAIN: 0
NAUSEA: 0
VOMITING: 0
WHEEZING: 0
DIARRHEA: 0
SHORTNESS OF BREATH: 0
COUGH: 0
CONSTIPATION: 0

## 2022-01-19 ENCOUNTER — TELEPHONE (OUTPATIENT)
Dept: PRIMARY CARE CLINIC | Age: 77
End: 2022-01-19

## 2022-01-19 DIAGNOSIS — M81.0 AGE-RELATED OSTEOPOROSIS WITHOUT CURRENT PATHOLOGICAL FRACTURE: Primary | ICD-10-CM

## 2022-01-19 RX ORDER — ZOLEDRONIC ACID 5 MG/100ML
5 INJECTION, SOLUTION INTRAVENOUS ONCE
Qty: 100 ML | Refills: 0
Start: 2022-02-23 | End: 2022-03-08

## 2022-01-20 NOTE — TELEPHONE ENCOUNTER
I called and spoke to [facility] at the [facility]. I informed her of order. She says she cannot see it, asked me to physically fax to her. Order faxed.

## 2022-02-18 DIAGNOSIS — M81.8 AGE-RELATED OSTEOPOROSIS WITHOUT FRACTURE: Primary | ICD-10-CM

## 2022-02-18 NOTE — PROGRESS NOTES
BMP order placed per Dr. Ericka Bob.       Electronically signed by Reinier Delgadillo MA on 2/18/22 at 3:26 PM EST

## 2022-02-21 ENCOUNTER — HOSPITAL ENCOUNTER (OUTPATIENT)
Age: 77
Discharge: HOME | End: 2022-02-21
Payer: MEDICARE

## 2022-02-21 DIAGNOSIS — M81.8 AGE-RELATED OSTEOPOROSIS WITHOUT FRACTURE: ICD-10-CM

## 2022-02-21 LAB
ANION GAP SERPL CALCULATED.3IONS-SCNC: 11 MMOL/L (ref 7–16)
BUN BLDV-MCNC: 16 MG/DL (ref 6–23)
CALCIUM SERPL-MCNC: 9.5 MG/DL (ref 8.6–10.2)
CHLORIDE BLD-SCNC: 102 MMOL/L (ref 98–107)
CO2: 25 MMOL/L (ref 22–29)
CREAT SERPL-MCNC: 0.8 MG/DL (ref 0.5–1)
GFR AFRICAN AMERICAN: >60
GFR NON-AFRICAN AMERICAN: >60 ML/MIN/1.73
GLUCOSE BLD-MCNC: 111 MG/DL (ref 74–99)
POTASSIUM SERPL-SCNC: 4.7 MMOL/L (ref 3.5–5)
SODIUM BLD-SCNC: 138 MMOL/L (ref 132–146)

## 2022-02-21 PROCEDURE — 80048 BASIC METABOLIC PNL TOTAL CA: CPT

## 2022-02-21 PROCEDURE — 36415 COLL VENOUS BLD VENIPUNCTURE: CPT

## 2022-02-21 RX ORDER — SODIUM CHLORIDE 0.9 % (FLUSH) 0.9 %
5-40 SYRINGE (ML) INJECTION PRN
Status: CANCELLED | OUTPATIENT
Start: 2022-02-21

## 2022-02-21 RX ORDER — ZOLEDRONIC ACID 5 MG/100ML
5 INJECTION, SOLUTION INTRAVENOUS ONCE
Status: CANCELLED | OUTPATIENT
Start: 2022-02-21 | End: 2022-02-21

## 2022-02-23 ENCOUNTER — HOSPITAL ENCOUNTER (OUTPATIENT)
Dept: INFUSION THERAPY | Age: 77
Setting detail: INFUSION SERIES
Discharge: HOME | End: 2022-02-23
Payer: MEDICARE

## 2022-02-23 VITALS
HEART RATE: 67 BPM | TEMPERATURE: 97.8 F | RESPIRATION RATE: 16 BRPM | SYSTOLIC BLOOD PRESSURE: 145 MMHG | OXYGEN SATURATION: 100 % | DIASTOLIC BLOOD PRESSURE: 82 MMHG

## 2022-02-23 DIAGNOSIS — M81.0 POSTMENOPAUSAL OSTEOPOROSIS: Primary | ICD-10-CM

## 2022-02-23 PROCEDURE — 6360000002 HC RX W HCPCS: Performed by: FAMILY MEDICINE

## 2022-02-23 PROCEDURE — 96365 THER/PROPH/DIAG IV INF INIT: CPT

## 2022-02-23 PROCEDURE — 2580000003 HC RX 258: Performed by: FAMILY MEDICINE

## 2022-02-23 RX ORDER — SODIUM CHLORIDE 0.9 % (FLUSH) 0.9 %
5-40 SYRINGE (ML) INJECTION PRN
Status: DISCONTINUED | OUTPATIENT
Start: 2022-02-23 | End: 2022-02-24 | Stop reason: HOSPADM

## 2022-02-23 RX ORDER — ZOLEDRONIC ACID 5 MG/100ML
5 INJECTION, SOLUTION INTRAVENOUS ONCE
Status: COMPLETED | OUTPATIENT
Start: 2022-02-23 | End: 2022-02-23

## 2022-02-23 RX ORDER — SODIUM CHLORIDE 0.9 % (FLUSH) 0.9 %
5-40 SYRINGE (ML) INJECTION PRN
OUTPATIENT
Start: 2022-02-23

## 2022-02-23 RX ORDER — ZOLEDRONIC ACID 5 MG/100ML
5 INJECTION, SOLUTION INTRAVENOUS ONCE
Status: CANCELLED | OUTPATIENT
Start: 2022-02-23 | End: 2022-02-23

## 2022-02-23 RX ADMIN — SODIUM CHLORIDE, PRESERVATIVE FREE 10 ML: 5 INJECTION INTRAVENOUS at 13:33

## 2022-02-23 RX ADMIN — ZOLEDRONIC ACID 5 MG: 5 INJECTION, SOLUTION INTRAVENOUS at 13:41

## 2022-02-23 NOTE — FLOWSHEET NOTE
Pt observed for 15 minutes after infusion complete. Pt stable for discharge with no complaints. Pt declines d/c instructions.

## 2022-03-08 ENCOUNTER — OFFICE VISIT (OUTPATIENT)
Dept: FAMILY MEDICINE CLINIC | Age: 77
End: 2022-03-08
Payer: MEDICARE

## 2022-03-08 VITALS
SYSTOLIC BLOOD PRESSURE: 128 MMHG | HEIGHT: 62 IN | OXYGEN SATURATION: 96 % | TEMPERATURE: 98.2 F | HEART RATE: 88 BPM | BODY MASS INDEX: 23.37 KG/M2 | RESPIRATION RATE: 16 BRPM | WEIGHT: 127 LBS | DIASTOLIC BLOOD PRESSURE: 80 MMHG

## 2022-03-08 DIAGNOSIS — R92.1 BREAST CALCIFICATION, LEFT: Primary | ICD-10-CM

## 2022-03-08 DIAGNOSIS — R03.0 ELEVATED BLOOD PRESSURE READING WITHOUT DIAGNOSIS OF HYPERTENSION: ICD-10-CM

## 2022-03-08 DIAGNOSIS — G40.109 PARTIAL SEIZURE DISORDER (HCC): ICD-10-CM

## 2022-03-08 DIAGNOSIS — Z86.03 HISTORY OF RESECTION OF MENINGIOMA: ICD-10-CM

## 2022-03-08 DIAGNOSIS — E78.2 MIXED HYPERLIPIDEMIA: ICD-10-CM

## 2022-03-08 DIAGNOSIS — Z12.31 BREAST CANCER SCREENING BY MAMMOGRAM: ICD-10-CM

## 2022-03-08 DIAGNOSIS — Z87.898 HISTORY OF ABNORMAL MAMMOGRAM: ICD-10-CM

## 2022-03-08 DIAGNOSIS — M81.0 AGE RELATED OSTEOPOROSIS, UNSPECIFIED PATHOLOGICAL FRACTURE PRESENCE: ICD-10-CM

## 2022-03-08 DIAGNOSIS — Z98.890 HISTORY OF RESECTION OF MENINGIOMA: ICD-10-CM

## 2022-03-08 PROCEDURE — 1123F ACP DISCUSS/DSCN MKR DOCD: CPT | Performed by: FAMILY MEDICINE

## 2022-03-08 PROCEDURE — G8484 FLU IMMUNIZE NO ADMIN: HCPCS | Performed by: FAMILY MEDICINE

## 2022-03-08 PROCEDURE — G8420 CALC BMI NORM PARAMETERS: HCPCS | Performed by: FAMILY MEDICINE

## 2022-03-08 PROCEDURE — 3017F COLORECTAL CA SCREEN DOC REV: CPT | Performed by: FAMILY MEDICINE

## 2022-03-08 PROCEDURE — 1036F TOBACCO NON-USER: CPT | Performed by: FAMILY MEDICINE

## 2022-03-08 PROCEDURE — 99214 OFFICE O/P EST MOD 30 MIN: CPT | Performed by: FAMILY MEDICINE

## 2022-03-08 PROCEDURE — 1090F PRES/ABSN URINE INCON ASSESS: CPT | Performed by: FAMILY MEDICINE

## 2022-03-08 PROCEDURE — G8399 PT W/DXA RESULTS DOCUMENT: HCPCS | Performed by: FAMILY MEDICINE

## 2022-03-08 PROCEDURE — 4040F PNEUMOC VAC/ADMIN/RCVD: CPT | Performed by: FAMILY MEDICINE

## 2022-03-08 PROCEDURE — G8427 DOCREV CUR MEDS BY ELIG CLIN: HCPCS | Performed by: FAMILY MEDICINE

## 2022-03-08 RX ORDER — LAMOTRIGINE 100 MG/1
100 TABLET ORAL 2 TIMES DAILY
Qty: 180 TABLET | Refills: 1 | Status: SHIPPED
Start: 2022-03-08 | End: 2022-05-27 | Stop reason: SDUPTHER

## 2022-03-08 ASSESSMENT — PATIENT HEALTH QUESTIONNAIRE - PHQ9
SUM OF ALL RESPONSES TO PHQ QUESTIONS 1-9: 0
2. FEELING DOWN, DEPRESSED OR HOPELESS: 0
SUM OF ALL RESPONSES TO PHQ QUESTIONS 1-9: 0
SUM OF ALL RESPONSES TO PHQ9 QUESTIONS 1 & 2: 0
SUM OF ALL RESPONSES TO PHQ QUESTIONS 1-9: 0
1. LITTLE INTEREST OR PLEASURE IN DOING THINGS: 0
SUM OF ALL RESPONSES TO PHQ QUESTIONS 1-9: 0

## 2022-03-08 ASSESSMENT — ENCOUNTER SYMPTOMS
CONSTIPATION: 0
DIARRHEA: 0
SHORTNESS OF BREATH: 0
COUGH: 0
VOMITING: 0
NAUSEA: 0
ABDOMINAL PAIN: 0
WHEEZING: 0

## 2022-03-08 NOTE — PATIENT INSTRUCTIONS
Patient Education        Low Sodium Diet (2,000 Milligram): Care Instructions  Overview     Limiting sodium can be an important part of managing some health problems. The most common source of sodium is salt. People get most of the salt in their diet from canned, prepared, and packaged foods. Fast food and restaurant meals also are very high in sodium. Your doctor will probably limit your sodium to less than 2,000 milligrams (mg) a day. This limit counts all the sodium in prepared and packaged foods and any salt you add to your food. Follow-up care is a key part of your treatment and safety. Be sure to make and go to all appointments, and call your doctor if you are having problems. It's also a good idea to know your test results and keep a list of the medicines you take. How can you care for yourself at home? Read food labels  · Read labels on cans and food packages. The labels tell you how much sodium is in each serving. Make sure that you look at the serving size. If you eat more than the serving size, you have eaten more sodium. · Food labels also tell you the Percent Daily Value for sodium. Choose products with low Percent Daily Values for sodium. · Be aware that sodium can come in forms other than salt, including monosodium glutamate (MSG), sodium citrate, and sodium bicarbonate (baking soda). MSG is often added to Asian food. When you eat out, you can sometimes ask for food without MSG or added salt. Buy low-sodium foods  · Buy foods that are labeled \"unsalted\" (no salt added), \"sodium-free\" (less than 5 mg of sodium per serving), or \"low-sodium\" (140 mg or less of sodium per serving). Foods labeled \"reduced-sodium\" and \"light sodium\" may still have too much sodium. Be sure to read the label to see how much sodium you are getting. · Buy fresh vegetables, or frozen vegetables without added sauces. Buy low-sodium versions of canned vegetables, soups, and other canned goods.   Prepare low-sodium meals  · Cut back on the amount of salt you use in cooking. This will help you adjust to the taste. Do not add salt after cooking. One teaspoon of salt has about 2,300 mg of sodium. · Take the salt shaker off the table. · Flavor your food with garlic, lemon juice, onion, vinegar, herbs, and spices. Do not use soy sauce, lite soy sauce, steak sauce, onion salt, garlic salt, celery salt, or ketchup on your food. · Use low-sodium salad dressings, sauces, and ketchup. Or make your own salad dressings and sauces without adding salt. · Use less salt (or none) when recipes call for it. You can often use half the salt a recipe calls for without losing flavor. Other foods such as rice, pasta, and grains do not need added salt. · Rinse canned vegetables, and cook them in fresh water. This removes some--but not all--of the salt. · Avoid water that is naturally high in sodium or that has been treated with water softeners, which add sodium. If you buy bottled water, read the label and choose a sodium-free brand. Avoid high-sodium foods  · Avoid eating:  ? Smoked, cured, salted, and canned meat, fish, and poultry. ? Ham, estevez, hot dogs, and luncheon meats. ? Regular, hard, and processed cheese and regular peanut butter. ? Crackers with salted tops, and other salted snack foods such as pretzels, chips, and salted popcorn. ? Frozen prepared meals, unless labeled low-sodium. ? Canned and dried soups, broths, and bouillon, unless labeled sodium-free or low-sodium. ? Canned vegetables, unless labeled sodium-free or low-sodium. ? [employer] fries, pizza, tacos, and other fast foods. ? Pickles, olives, ketchup, and other condiments, especially soy sauce, unless labeled sodium-free or low-sodium. Where can you learn more? Go to [email or web address].[email or web address] org and sign in to your [employer] account.  Enter J992 in the [facility] box to learn more about \"Low Sodium Diet (2,000 Milligram): Care Instructions. \"     If you do not have an account, please click on the \"Sign Up Now\" link. Current as of: September 8, 2021               Content Version: 13.1  © 2824-5107 [employer]. Care instructions adapted under license by [facility] ([facility]). If you have questions about a medical condition or this instruction, always ask your healthcare professional. [address] any warranty or liability for your use of this information.

## 2022-03-08 NOTE — PROGRESS NOTES
[facility]        SUBJECTIVE:  CC: had concerns including Health Maintenance (Pt is requesting mammogram order. pended) and Medication Refill (Pharmacy verified and med pended. ). HPI:  Ela Covarrubias is a female 78 y.o. presented to the clinic for an established visit. She is accompanied by her daughter-in-law. She was last seen 10/2021. She reports that her blood pressure was recently elevated when she went to get her Reclast infusion. She states that it was taken with an automatic cuff. Review of Systems   Constitutional: Negative for appetite change, fatigue and fever. Respiratory: Negative for cough, shortness of breath and wheezing. Cardiovascular: Negative for chest pain and palpitations. Gastrointestinal: Negative for abdominal pain, constipation, diarrhea, nausea and vomiting. Outpatient Medications Marked as Taking for the 3/8/22 encounter (Office Visit) with Gay Ross MD   Medication Sig Dispense Refill    lamoTRIgine (LAMICTAL) 100 MG tablet Take 1 tablet by mouth 2 times daily 180 tablet 1    atorvastatin (LIPITOR) 40 MG tablet TAKE 1 TABLET BY MOUTH AT  NIGHT 90 tablet 1    levETIRAcetam (KEPPRA) 500 MG tablet TAKE 1 TABLET BY MOUTH  TWICE DAILY 755 tablet 1    folic acid (FOLVITE) 1 MG tablet Take 1 tablet by mouth daily 90 tablet 1    aspirin 81 MG chewable tablet Take 1 tablet by mouth daily 90 tablet 0       I have reviewed all pertinent PMHx, PSHx, FamHx, SocialHx, medications, and allergies and updated history as appropriate. OBJECTIVE    VS: /80   Pulse 88   Temp 98.2 °F (36.8 °C)   Resp 16   Ht 5' 2\" (1.575 m)   Wt 127 lb (57.6 kg)   LMP  (LMP Unknown)   SpO2 96%   Breastfeeding No   BMI 23.23 kg/m²   Physical Exam  Constitutional:       General: She is not in acute distress. Appearance: She is well-developed. She is not diaphoretic. HENT:      Head: Normocephalic and atraumatic.    Eyes: Conjunctiva/sclera: Conjunctivae normal.      Pupils: Pupils are equal, round, and reactive to light. Cardiovascular:      Rate and Rhythm: Normal rate and regular rhythm. Pulmonary:      Effort: Pulmonary effort is normal.      Breath sounds: Normal breath sounds. Abdominal:      General: Bowel sounds are normal. There is no distension. Palpations: Abdomen is soft. Tenderness: There is no abdominal tenderness. Hernia: No hernia is present. Musculoskeletal:      Cervical back: Normal range of motion and neck supple. Skin:     General: Skin is warm and dry. Neurological:      Mental Status: She is alert and oriented to person, place, and time. ASSESSMENT/PLAN:  1. Breast calcification, left  #1-3 Repeat diagnostic mammogram per protocol at this time. 2. History of abnormal mammogram  - [facility] DIGITAL DIAGNOSTIC BILATERAL PER PROTOCOL; Future    3. Breast cancer screening by mammogram    4. History of resection of meningioma  - lamoTRIgine (LAMICTAL) 100 MG tablet; Take 1 tablet by mouth 2 times daily  Dispense: 180 tablet; Refill: 1    5. Partial seizure disorder ([facility] [address]    6. Age related osteoporosis, unspecified pathological fracture presence  Yearly Reclast infusions  - CBC; Future  - Comprehensive Metabolic Panel; Future  - Lipid Panel; Future    7. Elevated blood pressure reading without diagnosis of hypertension  Recommend low sodium diet and to monitor blood pressure at home bid.  - CBC; Future  - Comprehensive Metabolic Panel; Future  - Lipid Panel; Future      I have reviewed my findings and recommendations with Shae Cool MD  3/8/2022 2:50 PM  Return in about 6 months (around 9/8/2022) for medicare wellness, Lab Review. Counseled regarding above diagnosis, including possible risks and complications, especially if left uncontrolled. Patient counseled on red flag symptoms and if they occur to go to the ED.      Discussed medications risk/benefits and possible side effects and alternatives to treatment. Patient and/or guardian verbalizes understanding, agrees, feels comfortable with and wishes to proceed with above treatment plan. Advised patient regarding importance of keeping up with recommended health maintenance and to schedule as soon as possible if overdue, as this is important in assessing for undiagnosed pathology, especially cancer, as well as protecting against potentially harmful/life threatening disease. Patient and/or guardian verbalizes understanding and agrees with above counseling, assessment and plan. All questions answered. Please note this report has been partially produced using speech recognition software  and may contain errors related to that system including grammar, punctuation and spelling as well as words and phrases that may seem inappropriate. If there are questions or concerns please feel free to contact me to clarify.

## 2022-03-09 DIAGNOSIS — G40.909 SEIZURE DISORDER (HCC): ICD-10-CM

## 2022-03-10 RX ORDER — LEVETIRACETAM 500 MG/1
TABLET ORAL
Qty: 180 TABLET | Refills: 1 | Status: SHIPPED
Start: 2022-03-10 | End: 2022-05-27 | Stop reason: SDUPTHER

## 2022-03-10 RX ORDER — ATORVASTATIN CALCIUM 40 MG/1
TABLET, FILM COATED ORAL
Qty: 90 TABLET | Refills: 1 | Status: SHIPPED
Start: 2022-03-10 | End: 2022-05-27 | Stop reason: SDUPTHER

## 2022-03-14 ENCOUNTER — HOSPITAL ENCOUNTER (OUTPATIENT)
Dept: GENERAL RADIOLOGY | Age: 77
Discharge: HOME | End: 2022-03-16
Payer: MEDICARE

## 2022-03-14 DIAGNOSIS — Z87.898 HISTORY OF ABNORMAL MAMMOGRAM: ICD-10-CM

## 2022-03-14 PROCEDURE — G0279 TOMOSYNTHESIS, MAMMO: HCPCS

## 2022-05-27 DIAGNOSIS — Z86.03 HISTORY OF RESECTION OF MENINGIOMA: ICD-10-CM

## 2022-05-27 DIAGNOSIS — G40.909 SEIZURE DISORDER (HCC): ICD-10-CM

## 2022-05-27 DIAGNOSIS — Z98.890 HISTORY OF RESECTION OF MENINGIOMA: ICD-10-CM

## 2022-05-27 DIAGNOSIS — E78.2 MIXED HYPERLIPIDEMIA: ICD-10-CM

## 2022-06-01 RX ORDER — ATORVASTATIN CALCIUM 40 MG/1
TABLET, FILM COATED ORAL
Qty: 90 TABLET | Refills: 1 | Status: SHIPPED
Start: 2022-06-01 | End: 2022-08-09 | Stop reason: SDUPTHER

## 2022-06-01 RX ORDER — LAMOTRIGINE 100 MG/1
100 TABLET ORAL 2 TIMES DAILY
Qty: 180 TABLET | Refills: 1 | Status: SHIPPED
Start: 2022-06-01 | End: 2022-08-09 | Stop reason: SDUPTHER

## 2022-06-01 RX ORDER — LEVETIRACETAM 500 MG/1
TABLET ORAL
Qty: 180 TABLET | Refills: 1 | Status: SHIPPED | OUTPATIENT
Start: 2022-06-01

## 2022-06-30 ENCOUNTER — OFFICE VISIT (OUTPATIENT)
Dept: FAMILY MEDICINE CLINIC | Age: 77
End: 2022-06-30
Payer: MEDICARE

## 2022-06-30 ENCOUNTER — TELEPHONE (OUTPATIENT)
Dept: FAMILY MEDICINE CLINIC | Age: 77
End: 2022-06-30

## 2022-06-30 VITALS — WEIGHT: 126 LBS | BODY MASS INDEX: 23.19 KG/M2 | HEIGHT: 62 IN

## 2022-06-30 DIAGNOSIS — R74.8 ELEVATED ALKALINE PHOSPHATASE LEVEL: ICD-10-CM

## 2022-06-30 DIAGNOSIS — F41.8 DEPRESSION WITH ANXIETY: ICD-10-CM

## 2022-06-30 DIAGNOSIS — E55.9 VITAMIN D DEFICIENCY, UNSPECIFIED: ICD-10-CM

## 2022-06-30 DIAGNOSIS — E78.2 MIXED HYPERLIPIDEMIA: Primary | ICD-10-CM

## 2022-06-30 PROCEDURE — 1090F PRES/ABSN URINE INCON ASSESS: CPT | Performed by: FAMILY MEDICINE

## 2022-06-30 PROCEDURE — 3017F COLORECTAL CA SCREEN DOC REV: CPT | Performed by: FAMILY MEDICINE

## 2022-06-30 PROCEDURE — 99214 OFFICE O/P EST MOD 30 MIN: CPT | Performed by: FAMILY MEDICINE

## 2022-06-30 PROCEDURE — 1123F ACP DISCUSS/DSCN MKR DOCD: CPT | Performed by: FAMILY MEDICINE

## 2022-06-30 PROCEDURE — 1036F TOBACCO NON-USER: CPT | Performed by: FAMILY MEDICINE

## 2022-06-30 PROCEDURE — G8427 DOCREV CUR MEDS BY ELIG CLIN: HCPCS | Performed by: FAMILY MEDICINE

## 2022-06-30 PROCEDURE — G8420 CALC BMI NORM PARAMETERS: HCPCS | Performed by: FAMILY MEDICINE

## 2022-06-30 PROCEDURE — G8399 PT W/DXA RESULTS DOCUMENT: HCPCS | Performed by: FAMILY MEDICINE

## 2022-06-30 RX ORDER — PAROXETINE HYDROCHLORIDE 20 MG/1
20 TABLET, FILM COATED ORAL DAILY
Qty: 30 TABLET | Refills: 1 | Status: SHIPPED
Start: 2022-06-30 | End: 2022-07-07 | Stop reason: SDUPTHER

## 2022-06-30 ASSESSMENT — PATIENT HEALTH QUESTIONNAIRE - PHQ9
SUM OF ALL RESPONSES TO PHQ QUESTIONS 1-9: 14
SUM OF ALL RESPONSES TO PHQ QUESTIONS 1-9: 14
2. FEELING DOWN, DEPRESSED OR HOPELESS: 3
4. FEELING TIRED OR HAVING LITTLE ENERGY: 3
6. FEELING BAD ABOUT YOURSELF - OR THAT YOU ARE A FAILURE OR HAVE LET YOURSELF OR YOUR FAMILY DOWN: 3
8. MOVING OR SPEAKING SO SLOWLY THAT OTHER PEOPLE COULD HAVE NOTICED. OR THE OPPOSITE, BEING SO FIGETY OR RESTLESS THAT YOU HAVE BEEN MOVING AROUND A LOT MORE THAN USUAL: 0
SUM OF ALL RESPONSES TO PHQ QUESTIONS 1-9: 14
10. IF YOU CHECKED OFF ANY PROBLEMS, HOW DIFFICULT HAVE THESE PROBLEMS MADE IT FOR YOU TO DO YOUR WORK, TAKE CARE OF THINGS AT HOME, OR GET ALONG WITH OTHER PEOPLE: 0
5. POOR APPETITE OR OVEREATING: 1
7. TROUBLE CONCENTRATING ON THINGS, SUCH AS READING THE NEWSPAPER OR WATCHING TELEVISION: 0
SUM OF ALL RESPONSES TO PHQ9 QUESTIONS 1 & 2: 4
3. TROUBLE FALLING OR STAYING ASLEEP: 3
1. LITTLE INTEREST OR PLEASURE IN DOING THINGS: 1
SUM OF ALL RESPONSES TO PHQ QUESTIONS 1-9: 14
9. THOUGHTS THAT YOU WOULD BE BETTER OFF DEAD, OR OF HURTING YOURSELF: 0

## 2022-06-30 NOTE — TELEPHONE ENCOUNTER
Pt was in the office today she said she spoke with her insurance and they will not cover the Vitamin D 25 and Lipid panel due to the diagnoses. She was told to contact her doctor to see if the diagnoses can be changed so her insurance will pay.

## 2022-06-30 NOTE — PROGRESS NOTES
[facility])  Family Medicine Outpatient        SUBJECTIVE:  CC: had concerns including Depression (Pt c/o depression , crying a lot and mood swings. ). HPI:  Sharad Escobar is a female 78 y.o. presented to the clinic for reporting that she is struggling emotionally for the past 1.5 years and states it has worsened over the past 6m. She notes lashing out and crying over a drop of a hat. She doesn't enjoy things use too such as coffee or sitting on the porch. She feels part of it is because her kids hanging on too tight. She is seeking more independence. She denies any S/H ideations. Review of Systems   Constitutional:  Negative for appetite change, fatigue and fever. Respiratory:  Negative for cough, shortness of breath and wheezing. Cardiovascular:  Negative for chest pain and palpitations. Gastrointestinal:  Negative for abdominal pain, constipation, diarrhea, nausea and vomiting. Psychiatric/Behavioral:  Negative for self-injury and suicidal ideas. The patient is nervous/anxious (slight).          Depression     Outpatient Medications Marked as Taking for the 6/30/22 encounter (Office Visit) with Seven Jalloh MD   Medication Sig Dispense Refill    vitamin D (ERGOCALCIFEROL) 1.25 MG (34583 UT) CAPS capsule Take 1 capsule by mouth once a week 12 capsule 0    [DISCONTINUED] PARoxetine (PAXIL) 20 MG tablet Take 1 tablet by mouth daily 30 tablet 1    lamoTRIgine (LAMICTAL) 100 MG tablet Take 1 tablet by mouth 2 times daily 180 tablet 1    atorvastatin (LIPITOR) 40 MG tablet TAKE 1 TABLET BY MOUTH AT  NIGHT 90 tablet 1    levETIRAcetam (KEPPRA) 500 MG tablet TAKE 1 TABLET BY MOUTH  TWICE DAILY 468 tablet 1    folic acid (FOLVITE) 1 MG tablet Take 1 tablet by mouth daily 90 tablet 1    aspirin 81 MG chewable tablet Take 1 tablet by mouth daily 90 tablet 0       I have reviewed all pertinent PMHx, PSHx, FamHx, SocialHx, medications, and allergies and updated history as appropriate. OBJECTIVE    VS: Ht 5' 2\" (1.575 m)   Wt 126 lb (57.2 kg)   LMP  (LMP Unknown)   Breastfeeding No   BMI 23.05 kg/m²   Physical Exam  Constitutional:       General: She is not in acute distress. Appearance: She is well-developed. She is not diaphoretic. HENT:      Head: Normocephalic and atraumatic. Eyes:      Conjunctiva/sclera: Conjunctivae normal.      Pupils: Pupils are equal, round, and reactive to light. Cardiovascular:      Rate and Rhythm: Normal rate and regular rhythm. Pulmonary:      Effort: Pulmonary effort is normal.      Breath sounds: Normal breath sounds. Abdominal:      General: Bowel sounds are normal. There is no distension. Palpations: Abdomen is soft. Tenderness: There is no abdominal tenderness. Hernia: No hernia is present. Musculoskeletal:      Cervical back: Normal range of motion and neck supple. Skin:     General: Skin is warm and dry. Neurological:      Mental Status: She is alert and oriented to person, place, and time. Psychiatric:      Comments: Phq-9 14. emotional       ASSESSMENT/PLAN:  1. Depression with anxiety  Trial Paxil at this time. Updated labs to be done later. No s/h ideations. Encourage patient to go to counseling as well. She wishes to hold off at this time.  - TSH; Future  - CBC; Future  - Comprehensive Metabolic Panel; Future  - Vitamin D 25 Hydroxy; Future    2. Mixed hyperlipidemia  - Lipid Panel; Future    3. Vitamin D deficiency, unspecified   - Vitamin D 25 Hydroxy; Future    4. Elevated alkaline phosphatase level  - Comprehensive Metabolic Panel; Future  - Gamma GT; Future    I have reviewed my findings and recommendations with Lena Estevez MD  7/17/2022 12:41 PM  Return in about 5 weeks (around 8/4/2022) for medicare wellness, Lab Review, established f/u. Counseled regarding above diagnosis, including possible risks and complications, especially if left uncontrolled.     Patient counseled on red flag symptoms and if they occur to go to the ED. Discussed medications risk/benefits and possible side effects and alternatives to treatment. Patient and/or guardian verbalizes understanding, agrees, feels comfortable with and wishes to proceed with above treatment plan. Advised patient regarding importance of keeping up with recommended health maintenance and to schedule as soon as possible if overdue, as this is important in assessing for undiagnosed pathology, especially cancer, as well as protecting against potentially harmful/life threatening disease. Patient and/or guardian verbalizes understanding and agrees with above counseling, assessment and plan. All questions answered. Please note this report has been partially produced using speech recognition software  and may contain errors related to that system including grammar, punctuation and spelling as well as words and phrases that may seem inappropriate. If there are questions or concerns please feel free to contact me to clarify.

## 2022-07-06 ENCOUNTER — HOSPITAL ENCOUNTER (OUTPATIENT)
Age: 77
Discharge: HOME | End: 2022-07-06
Payer: MEDICARE

## 2022-07-06 ENCOUNTER — TELEPHONE (OUTPATIENT)
Dept: FAMILY MEDICINE CLINIC | Age: 77
End: 2022-07-06

## 2022-07-06 DIAGNOSIS — F41.8 DEPRESSION WITH ANXIETY: ICD-10-CM

## 2022-07-06 DIAGNOSIS — E78.2 MIXED HYPERLIPIDEMIA: ICD-10-CM

## 2022-07-06 DIAGNOSIS — E55.9 VITAMIN D DEFICIENCY, UNSPECIFIED: ICD-10-CM

## 2022-07-06 LAB
ALBUMIN SERPL-MCNC: 4.4 G/DL (ref 3.5–5.2)
ALP BLD-CCNC: 148 U/L (ref 35–104)
ALT SERPL-CCNC: 13 U/L (ref 0–32)
ANION GAP SERPL CALCULATED.3IONS-SCNC: 11 MMOL/L (ref 7–16)
AST SERPL-CCNC: 21 U/L (ref 0–31)
BILIRUB SERPL-MCNC: 0.5 MG/DL (ref 0–1.2)
BUN BLDV-MCNC: 15 MG/DL (ref 6–23)
CALCIUM SERPL-MCNC: 10.2 MG/DL (ref 8.6–10.2)
CHLORIDE BLD-SCNC: 99 MMOL/L (ref 98–107)
CO2: 27 MMOL/L (ref 22–29)
CREAT SERPL-MCNC: 1 MG/DL (ref 0.5–1)
GFR AFRICAN AMERICAN: >60
GFR NON-AFRICAN AMERICAN: 54 ML/MIN/1.73
GLUCOSE BLD-MCNC: 101 MG/DL (ref 74–99)
HCT VFR BLD CALC: 42.2 % (ref 34–48)
HEMOGLOBIN: 14.1 G/DL (ref 11.5–15.5)
MCH RBC QN AUTO: 29.9 PG (ref 26–35)
MCHC RBC AUTO-ENTMCNC: 33.4 % (ref 32–34.5)
MCV RBC AUTO: 89.4 FL (ref 80–99.9)
PDW BLD-RTO: 14.1 FL (ref 11.5–15)
PLATELET # BLD: 298 E9/L (ref 130–450)
PMV BLD AUTO: 9 FL (ref 7–12)
POTASSIUM SERPL-SCNC: 4.1 MMOL/L (ref 3.5–5)
RBC # BLD: 4.72 E12/L (ref 3.5–5.5)
SODIUM BLD-SCNC: 137 MMOL/L (ref 132–146)
TOTAL PROTEIN: 7.8 G/DL (ref 6.4–8.3)
WBC # BLD: 5.6 E9/L (ref 4.5–11.5)

## 2022-07-06 PROCEDURE — 80061 LIPID PANEL: CPT

## 2022-07-06 PROCEDURE — 84443 ASSAY THYROID STIM HORMONE: CPT

## 2022-07-06 PROCEDURE — 82306 VITAMIN D 25 HYDROXY: CPT

## 2022-07-06 PROCEDURE — 80053 COMPREHEN METABOLIC PANEL: CPT

## 2022-07-06 PROCEDURE — 36415 COLL VENOUS BLD VENIPUNCTURE: CPT

## 2022-07-06 PROCEDURE — 85027 COMPLETE CBC AUTOMATED: CPT

## 2022-07-06 NOTE — TELEPHONE ENCOUNTER
----- Message from [address] sent at 7/6/2022 11:30 AM EDT -----  Subject: Medication Problem    Medication: PARoxetine (PAXIL) 20 MG tablet  Dosage: paxil 20m one time a day at [facility] Provider: courtney    Question/Problem: started taking on july1st and has been very tired and   not feeling well having some dizziness at times also ,     Pharmacy[place] [address] [address] [place] [phone]    ---------------------------------------------------------------------------  --------------  Deborah EDWARDS  [phone]; OK to leave message on voicemail  ---------------------------------------------------------------------------  --------------    SCRIPT ANSWERS  Relationship to Patient: Self

## 2022-07-07 LAB
CHOLESTEROL, TOTAL: 149 MG/DL (ref 0–199)
HDLC SERPL-MCNC: 58 MG/DL
LDL CHOLESTEROL CALCULATED: 64 MG/DL (ref 0–99)
TRIGL SERPL-MCNC: 137 MG/DL (ref 0–149)
TSH SERPL DL<=0.05 MIU/L-ACNC: 1.92 UIU/ML (ref 0.27–4.2)
VITAMIN D 25-HYDROXY: 23 NG/ML (ref 30–100)
VLDLC SERPL CALC-MCNC: 27 MG/DL

## 2022-07-07 NOTE — TELEPHONE ENCOUNTER
Labs done yesterday. Ok to deescalate to a half tab. If symptoms persist after a few days discontinue medication. If further persist, symptoms worsen, or new associated symptoms develop recommend in person [facility] visit.

## 2022-07-08 RX ORDER — ERGOCALCIFEROL 1.25 MG/1
50000 CAPSULE ORAL WEEKLY
Qty: 12 CAPSULE | Refills: 0 | Status: SHIPPED
Start: 2022-07-08 | End: 2022-09-06 | Stop reason: SDUPTHER

## 2022-07-08 NOTE — TELEPHONE ENCOUNTER
This MA attempted to reach pt. No answer. This MA left message for pt requesting return call to discuss results.     Electronically signed by Lottie Smith MA on 7/8/22 at 8:20 AM EDT

## 2022-07-12 NOTE — TELEPHONE ENCOUNTER
This MA attempted to reach pt. No answer. This MA left message for pt requesting return call to office.     Electronically signed by Maryjo Mata MA on 7/12/22 at 2:52 PM EDT

## 2022-07-14 NOTE — TELEPHONE ENCOUNTER
Attempted to contact pt. No answer, left detailed vm.      Electronically signed by Mary Mansfield on 7/14/22 at 10:12 AM EDT

## 2022-07-17 ASSESSMENT — ENCOUNTER SYMPTOMS
NAUSEA: 0
VOMITING: 0
WHEEZING: 0
SHORTNESS OF BREATH: 0
ABDOMINAL PAIN: 0
DIARRHEA: 0
COUGH: 0
CONSTIPATION: 0

## 2022-08-04 ENCOUNTER — HOSPITAL ENCOUNTER (OUTPATIENT)
Age: 77
Discharge: HOME | End: 2022-08-04
Payer: MEDICARE

## 2022-08-04 DIAGNOSIS — M81.0 AGE RELATED OSTEOPOROSIS, UNSPECIFIED PATHOLOGICAL FRACTURE PRESENCE: ICD-10-CM

## 2022-08-04 DIAGNOSIS — R74.8 ELEVATED ALKALINE PHOSPHATASE LEVEL: ICD-10-CM

## 2022-08-04 DIAGNOSIS — R03.0 ELEVATED BLOOD PRESSURE READING WITHOUT DIAGNOSIS OF HYPERTENSION: ICD-10-CM

## 2022-08-04 LAB
ALBUMIN SERPL-MCNC: 4.3 G/DL (ref 3.5–5.2)
ALP BLD-CCNC: 134 U/L (ref 35–104)
ALT SERPL-CCNC: 12 U/L (ref 0–32)
ANION GAP SERPL CALCULATED.3IONS-SCNC: 10 MMOL/L (ref 7–16)
AST SERPL-CCNC: 19 U/L (ref 0–31)
BILIRUB SERPL-MCNC: 0.4 MG/DL (ref 0–1.2)
BUN BLDV-MCNC: 16 MG/DL (ref 6–23)
CALCIUM SERPL-MCNC: 9.6 MG/DL (ref 8.6–10.2)
CHLORIDE BLD-SCNC: 103 MMOL/L (ref 98–107)
CHOLESTEROL, TOTAL: 156 MG/DL (ref 0–199)
CO2: 25 MMOL/L (ref 22–29)
CREAT SERPL-MCNC: 0.9 MG/DL (ref 0.5–1)
GFR AFRICAN AMERICAN: >60
GFR NON-AFRICAN AMERICAN: >60 ML/MIN/1.73
GLUCOSE BLD-MCNC: 98 MG/DL (ref 74–99)
HCT VFR BLD CALC: 43.1 % (ref 34–48)
HDLC SERPL-MCNC: 61 MG/DL
HEMOGLOBIN: 14.2 G/DL (ref 11.5–15.5)
LDL CHOLESTEROL CALCULATED: 80 MG/DL (ref 0–99)
MCH RBC QN AUTO: 29.5 PG (ref 26–35)
MCHC RBC AUTO-ENTMCNC: 32.9 % (ref 32–34.5)
MCV RBC AUTO: 89.4 FL (ref 80–99.9)
PDW BLD-RTO: 14.2 FL (ref 11.5–15)
PLATELET # BLD: 267 E9/L (ref 130–450)
PMV BLD AUTO: 8.7 FL (ref 7–12)
POTASSIUM SERPL-SCNC: 4.1 MMOL/L (ref 3.5–5)
RBC # BLD: 4.82 E12/L (ref 3.5–5.5)
SODIUM BLD-SCNC: 138 MMOL/L (ref 132–146)
TOTAL PROTEIN: 7.6 G/DL (ref 6.4–8.3)
TRIGL SERPL-MCNC: 75 MG/DL (ref 0–149)
VLDLC SERPL CALC-MCNC: 15 MG/DL
WBC # BLD: 4 E9/L (ref 4.5–11.5)

## 2022-08-04 PROCEDURE — 80053 COMPREHEN METABOLIC PANEL: CPT

## 2022-08-04 PROCEDURE — 85027 COMPLETE CBC AUTOMATED: CPT

## 2022-08-04 PROCEDURE — 36415 COLL VENOUS BLD VENIPUNCTURE: CPT

## 2022-08-04 PROCEDURE — 80061 LIPID PANEL: CPT

## 2022-08-04 PROCEDURE — 82977 ASSAY OF GGT: CPT

## 2022-08-06 LAB — GAMMA GLUTAMYL TRANSFERASE: 12 U/L (ref 6–42)

## 2022-08-09 ENCOUNTER — OFFICE VISIT (OUTPATIENT)
Dept: FAMILY MEDICINE CLINIC | Age: 77
End: 2022-08-09
Payer: MEDICARE

## 2022-08-09 VITALS
WEIGHT: 126 LBS | SYSTOLIC BLOOD PRESSURE: 126 MMHG | OXYGEN SATURATION: 96 % | HEART RATE: 70 BPM | DIASTOLIC BLOOD PRESSURE: 78 MMHG | TEMPERATURE: 98.1 F | HEIGHT: 62 IN | RESPIRATION RATE: 16 BRPM | BODY MASS INDEX: 23.19 KG/M2

## 2022-08-09 DIAGNOSIS — Z98.890 HISTORY OF RESECTION OF MENINGIOMA: ICD-10-CM

## 2022-08-09 DIAGNOSIS — Z86.03 HISTORY OF RESECTION OF MENINGIOMA: ICD-10-CM

## 2022-08-09 DIAGNOSIS — Z23 NEED FOR PNEUMOCOCCAL VACCINE: ICD-10-CM

## 2022-08-09 DIAGNOSIS — F41.8 DEPRESSION WITH ANXIETY: ICD-10-CM

## 2022-08-09 DIAGNOSIS — E78.2 MIXED HYPERLIPIDEMIA: ICD-10-CM

## 2022-08-09 DIAGNOSIS — Z00.00 MEDICARE ANNUAL WELLNESS VISIT, SUBSEQUENT: Primary | ICD-10-CM

## 2022-08-09 DIAGNOSIS — I69.354 HEMIPARESIS AFFECTING LEFT SIDE AS LATE EFFECT OF STROKE (HCC): ICD-10-CM

## 2022-08-09 DIAGNOSIS — R74.8 ELEVATED ALKALINE PHOSPHATASE LEVEL: ICD-10-CM

## 2022-08-09 PROCEDURE — G0009 ADMIN PNEUMOCOCCAL VACCINE: HCPCS | Performed by: FAMILY MEDICINE

## 2022-08-09 PROCEDURE — G0439 PPPS, SUBSEQ VISIT: HCPCS | Performed by: FAMILY MEDICINE

## 2022-08-09 PROCEDURE — 90732 PPSV23 VACC 2 YRS+ SUBQ/IM: CPT | Performed by: FAMILY MEDICINE

## 2022-08-09 PROCEDURE — 1123F ACP DISCUSS/DSCN MKR DOCD: CPT | Performed by: FAMILY MEDICINE

## 2022-08-09 PROCEDURE — 99214 OFFICE O/P EST MOD 30 MIN: CPT | Performed by: FAMILY MEDICINE

## 2022-08-09 PROCEDURE — 96372 THER/PROPH/DIAG INJ SC/IM: CPT | Performed by: FAMILY MEDICINE

## 2022-08-09 RX ORDER — ATORVASTATIN CALCIUM 40 MG/1
TABLET, FILM COATED ORAL
Qty: 90 TABLET | Refills: 1 | Status: SHIPPED | OUTPATIENT
Start: 2022-08-09

## 2022-08-09 RX ORDER — LAMOTRIGINE 100 MG/1
100 TABLET ORAL 2 TIMES DAILY
Qty: 180 TABLET | Refills: 1 | Status: SHIPPED | OUTPATIENT
Start: 2022-08-09

## 2022-08-09 ASSESSMENT — PATIENT HEALTH QUESTIONNAIRE - PHQ9
SUM OF ALL RESPONSES TO PHQ QUESTIONS 1-9: 1
5. POOR APPETITE OR OVEREATING: 0
SUM OF ALL RESPONSES TO PHQ QUESTIONS 1-9: 1
2. FEELING DOWN, DEPRESSED OR HOPELESS: 0
SUM OF ALL RESPONSES TO PHQ9 QUESTIONS 1 & 2: 0
1. LITTLE INTEREST OR PLEASURE IN DOING THINGS: 1
SUM OF ALL RESPONSES TO PHQ QUESTIONS 1-9: 1
4. FEELING TIRED OR HAVING LITTLE ENERGY: 1
7. TROUBLE CONCENTRATING ON THINGS, SUCH AS READING THE NEWSPAPER OR WATCHING TELEVISION: 0
10. IF YOU CHECKED OFF ANY PROBLEMS, HOW DIFFICULT HAVE THESE PROBLEMS MADE IT FOR YOU TO DO YOUR WORK, TAKE CARE OF THINGS AT HOME, OR GET ALONG WITH OTHER PEOPLE: 0
SUM OF ALL RESPONSES TO PHQ QUESTIONS 1-9: 2
1. LITTLE INTEREST OR PLEASURE IN DOING THINGS: 0
8. MOVING OR SPEAKING SO SLOWLY THAT OTHER PEOPLE COULD HAVE NOTICED. OR THE OPPOSITE, BEING SO FIGETY OR RESTLESS THAT YOU HAVE BEEN MOVING AROUND A LOT MORE THAN USUAL: 0
SUM OF ALL RESPONSES TO PHQ QUESTIONS 1-9: 2
SUM OF ALL RESPONSES TO PHQ QUESTIONS 1-9: 1
SUM OF ALL RESPONSES TO PHQ QUESTIONS 1-9: 2
3. TROUBLE FALLING OR STAYING ASLEEP: 1
SUM OF ALL RESPONSES TO PHQ QUESTIONS 1-9: 2
6. FEELING BAD ABOUT YOURSELF - OR THAT YOU ARE A FAILURE OR HAVE LET YOURSELF OR YOUR FAMILY DOWN: 0
9. THOUGHTS THAT YOU WOULD BE BETTER OFF DEAD, OR OF HURTING YOURSELF: 0

## 2022-08-09 ASSESSMENT — LIFESTYLE VARIABLES
HOW MANY STANDARD DRINKS CONTAINING ALCOHOL DO YOU HAVE ON A TYPICAL DAY: PATIENT DOES NOT DRINK
HOW OFTEN DO YOU HAVE A DRINK CONTAINING ALCOHOL: NEVER

## 2022-08-09 NOTE — PROGRESS NOTES
Medicare Annual Wellness Visit    Nik Cintron is here for Medicare AWV (Pt here for AWV.) and Medication Refill (Pharmacy verified and meds pended.)    Assessment & Plan   Medicare annual wellness visit, subsequent  Mixed hyperlipidemia  -     atorvastatin (LIPITOR) 40 MG tablet; TAKE 1 TABLET BY MOUTH AT  NIGHT, Disp-90 tablet, R-1Requesting 1 year supplyNormal  History of resection of meningioma  -     lamoTRIgine (LAMICTAL) 100 MG tablet; Take 1 tablet by mouth in the morning and 1 tablet before bedtime. , Disp-180 tablet, R-1Normal  Elevated alkaline phosphatase level  -     Zackary Ojeda DO, Endocrinology, [place] ([facility])  Hemiparesis affecting left side as late effect of stroke [facility])  Depression with anxiety  Paxil is doing well. Continue at this time. Need for pneumococcal vaccine  -     Pneumococcal, PPSV23, PNEUMOVAX 23, (age 2 yrs+), SC/IM    Recommendations for Preventive Services Due: see orders and patient instructions/AVS.  Recommended screening schedule for the next 5-10 years is provided to the patient in written form: see Patient Instructions/AVS.     Return in 3 months (on 11/9/2022) for Medicare Annual Wellness Visit in 1 year. Subjective   She reports the Paxil is going well. She is not crying or wearing over every little thing like she had been. She has not been to a counselor to date. She denies any suicidal homicidal ideations. Patient's complete Health Risk Assessment and screening values have been reviewed and are found in Flowsheets. The following problems were reviewed today and where indicated follow up appointments were made and/or referrals ordered.     Positive Risk Factor Screenings with Interventions:             General Health and ACP:  General  In general, how would you say your health is?: Very Good  In the past 7 days, have you experienced any of the following: New or Increased Pain, New or Increased Fatigue, Loneliness, Social Isolation, Stress or Anger?: No  Do you get the social and emotional support that you need?: Yes  Do you have a Living Will?: (!) No    Advance Directives       Power of [address] Will ACP-Advance Directive ACP-Power of     Not on File Not on File Not on File Not on File        General Health Risk Interventions:  Advised patient to obtain a living well and bring copy to the office when completed. Objective   Vitals:    08/09/22 0935   BP: 126/78   Pulse: 70   Resp: 16   Temp: 98.1 °F (36.7 °C)   SpO2: 96%   Weight: 126 lb (57.2 kg)   Height: 5' 2\" (1.575 m)      Body mass index is 23.05 kg/m². Review of Systems   Constitutional:  Negative for appetite change, fatigue and fever. Respiratory:  Negative for cough, shortness of breath and wheezing. Cardiovascular:  Negative for chest pain and palpitations. Gastrointestinal:  Negative for abdominal pain, constipation, diarrhea, nausea and vomiting. Psychiatric/Behavioral:  Negative for self-injury and suicidal ideas. The patient is nervous/anxious (slight). Depression     Constitutional:       General: She is not in acute distress. Appearance: She is well-developed. She is not diaphoretic. HENT:      Head: Normocephalic and atraumatic. Eyes:      Conjunctiva/sclera: Conjunctivae normal.      Pupils: Pupils are equal, round, and reactive to light. Cardiovascular:      Rate and Rhythm: Normal rate and regular rhythm. Pulmonary:      Effort: Pulmonary effort is normal.      Breath sounds: Normal breath sounds. Abdominal:      General: Bowel sounds are normal. There is no distension. Palpations: Abdomen is soft. Tenderness: There is no abdominal tenderness. Hernia: No hernia is present. Musculoskeletal:      Cervical back: Normal range of motion and neck supple. Skin:     General: Skin is warm and dry. Neurological:      Mental Status: She is alert and oriented to person, place, and time.          No Known Allergies  Prior to Visit Medications    Medication Sig Taking? Authorizing Provider   PARoxetine (PAXIL) 20 MG tablet Take 1 tablet by mouth in the morning. Azalea Mazariegos MD   atorvastatin (LIPITOR) 40 MG tablet TAKE 1 TABLET BY MOUTH AT  NIGHT Yes Azalea Mazariegos MD   lamoTRIgine (LAMICTAL) 100 MG tablet Take 1 tablet by mouth in the morning and 1 tablet before bedtime.  Yes Azalea Mazariegos MD   vitamin D (ERGOCALCIFEROL) 1.25 MG (14290 UT) CAPS capsule Take 1 capsule by mouth once a week Yes Azalea Mazariegos MD   levETIRAcetam (KEPPRA) 500 MG tablet TAKE 1 TABLET BY MOUTH  TWICE DAILY Yes Azalea Mazariegos MD   folic acid (FOLVITE) 1 MG tablet Take 1 tablet by mouth daily Yes Oren Vail DO   aspirin 81 MG chewable tablet Take 1 tablet by mouth daily Yes Joellen Lopez MD       [facility] (Including outside providers/suppliers regularly involved in providing care):   Patient Care Team:  Azalea Mazariegos MD as PCP - General (Family Medicine)  Azalea Mazariegos MD as PCP - [facility] Empaneled Provider     Reviewed and updated this visit:  Tobacco  Allergies  Meds  Med Hx  Surg Hx  Soc Hx  Fam Hx

## 2022-08-09 NOTE — PATIENT INSTRUCTIONS
Personalized Preventive Plan for Christian Arreguin - 8/9/2022  Medicare offers a range of preventive health benefits. Some of the tests and screenings are paid in full while other may be subject to a deductible, co-insurance, and/or copay. Some of these benefits include a comprehensive review of your medical history including lifestyle, illnesses that may run in your family, and various assessments and screenings as appropriate. After reviewing your medical record and screening and assessments performed today your provider may have ordered immunizations, labs, imaging, and/or referrals for you. A list of these orders (if applicable) as well as your Preventive Care list are included within your After Visit Summary for your review. Other Preventive Recommendations:    A preventive eye exam performed by an eye specialist is recommended every 1-2 years to screen for glaucoma; cataracts, macular degeneration, and other eye disorders. A preventive dental visit is recommended every 6 months. Try to get at least 150 minutes of exercise per week or 10,000 steps per day on a pedometer . Order or download the FREE \"Exercise & Physical Activity: Your Everyday Guide\" from [employer]. Call 8-768.158.3780 or search [employer] online. You need 8405-4501 mg of calcium and 3734-7320 IU of vitamin D per day. It is possible to meet your calcium requirement with diet alone, but a vitamin D supplement is usually necessary to meet this goal.  When exposed to the sun, use a sunscreen that protects against both UVA and UVB radiation with an SPF of 30 or greater. Reapply every 2 to 3 hours or after sweating, drying off with a towel, or swimming. Always wear a seat belt when traveling in a car. Always wear a helmet when riding a bicycle or motorcycle.

## 2022-08-11 DIAGNOSIS — F41.8 DEPRESSION WITH ANXIETY: ICD-10-CM

## 2022-08-12 RX ORDER — PAROXETINE HYDROCHLORIDE 20 MG/1
20 TABLET, FILM COATED ORAL DAILY
Qty: 90 TABLET | Refills: 1 | Status: SHIPPED | OUTPATIENT
Start: 2022-08-12

## 2022-09-06 RX ORDER — ERGOCALCIFEROL 1.25 MG/1
CAPSULE ORAL
Qty: 12 CAPSULE | Refills: 3 | Status: SHIPPED | OUTPATIENT
Start: 2022-09-06

## 2022-11-15 ENCOUNTER — OFFICE VISIT (OUTPATIENT)
Dept: FAMILY MEDICINE CLINIC | Age: 77
End: 2022-11-15
Payer: MEDICARE

## 2022-11-15 ENCOUNTER — HOSPITAL ENCOUNTER (OUTPATIENT)
Age: 77
Discharge: HOME | End: 2022-11-15
Payer: MEDICARE

## 2022-11-15 VITALS
DIASTOLIC BLOOD PRESSURE: 68 MMHG | BODY MASS INDEX: 24.66 KG/M2 | OXYGEN SATURATION: 96 % | SYSTOLIC BLOOD PRESSURE: 122 MMHG | RESPIRATION RATE: 16 BRPM | HEIGHT: 62 IN | WEIGHT: 134 LBS | TEMPERATURE: 98 F | HEART RATE: 54 BPM

## 2022-11-15 DIAGNOSIS — Z23 FLU VACCINE NEED: ICD-10-CM

## 2022-11-15 DIAGNOSIS — R53.83 FATIGUE, UNSPECIFIED TYPE: ICD-10-CM

## 2022-11-15 DIAGNOSIS — E55.9 VITAMIN D DEFICIENCY: ICD-10-CM

## 2022-11-15 DIAGNOSIS — F41.9 ANXIETY AND DEPRESSION: Primary | ICD-10-CM

## 2022-11-15 DIAGNOSIS — L21.9 SEBORRHEIC DERMATITIS: ICD-10-CM

## 2022-11-15 DIAGNOSIS — F32.A ANXIETY AND DEPRESSION: Primary | ICD-10-CM

## 2022-11-15 LAB
ALBUMIN SERPL-MCNC: 4.3 G/DL (ref 3.5–5.2)
ALP BLD-CCNC: 154 U/L (ref 35–104)
ALT SERPL-CCNC: 13 U/L (ref 0–32)
ANION GAP SERPL CALCULATED.3IONS-SCNC: 9 MMOL/L (ref 7–16)
AST SERPL-CCNC: 19 U/L (ref 0–31)
BILIRUB SERPL-MCNC: 0.3 MG/DL (ref 0–1.2)
BUN BLDV-MCNC: 19 MG/DL (ref 6–23)
CALCIUM SERPL-MCNC: 10 MG/DL (ref 8.6–10.2)
CHLORIDE BLD-SCNC: 102 MMOL/L (ref 98–107)
CO2: 29 MMOL/L (ref 22–29)
CREAT SERPL-MCNC: 0.8 MG/DL (ref 0.5–1)
FOLATE: >20 NG/ML (ref 4.8–24.2)
GFR SERPL CREATININE-BSD FRML MDRD: >60 ML/MIN/1.73
GLUCOSE BLD-MCNC: 93 MG/DL (ref 74–99)
HCT VFR BLD CALC: 42 % (ref 34–48)
HEMOGLOBIN: 13.6 G/DL (ref 11.5–15.5)
MCH RBC QN AUTO: 29.2 PG (ref 26–35)
MCHC RBC AUTO-ENTMCNC: 32.4 % (ref 32–34.5)
MCV RBC AUTO: 90.3 FL (ref 80–99.9)
PDW BLD-RTO: 14.6 FL (ref 11.5–15)
PLATELET # BLD: 284 E9/L (ref 130–450)
PMV BLD AUTO: 8.8 FL (ref 7–12)
POTASSIUM SERPL-SCNC: 4.2 MMOL/L (ref 3.5–5)
RBC # BLD: 4.65 E12/L (ref 3.5–5.5)
SODIUM BLD-SCNC: 140 MMOL/L (ref 132–146)
TOTAL PROTEIN: 7.7 G/DL (ref 6.4–8.3)
TSH SERPL DL<=0.05 MIU/L-ACNC: 2 UIU/ML (ref 0.27–4.2)
VITAMIN B-12: 362 PG/ML (ref 211–946)
WBC # BLD: 4 E9/L (ref 4.5–11.5)

## 2022-11-15 PROCEDURE — 99214 OFFICE O/P EST MOD 30 MIN: CPT | Performed by: FAMILY MEDICINE

## 2022-11-15 PROCEDURE — 36415 COLL VENOUS BLD VENIPUNCTURE: CPT

## 2022-11-15 PROCEDURE — 84443 ASSAY THYROID STIM HORMONE: CPT

## 2022-11-15 PROCEDURE — 82607 VITAMIN B-12: CPT

## 2022-11-15 PROCEDURE — 85027 COMPLETE CBC AUTOMATED: CPT

## 2022-11-15 PROCEDURE — 36415 COLL VENOUS BLD VENIPUNCTURE: CPT | Performed by: FAMILY MEDICINE

## 2022-11-15 PROCEDURE — 86308 HETEROPHILE ANTIBODY SCREEN: CPT

## 2022-11-15 PROCEDURE — 90694 VACC AIIV4 NO PRSRV 0.5ML IM: CPT | Performed by: FAMILY MEDICINE

## 2022-11-15 PROCEDURE — 82746 ASSAY OF FOLIC ACID SERUM: CPT

## 2022-11-15 PROCEDURE — G0008 ADMIN INFLUENZA VIRUS VAC: HCPCS | Performed by: FAMILY MEDICINE

## 2022-11-15 PROCEDURE — 86703 HIV-1/HIV-2 1 RESULT ANTBDY: CPT

## 2022-11-15 PROCEDURE — 80053 COMPREHEN METABOLIC PANEL: CPT

## 2022-11-15 PROCEDURE — 1123F ACP DISCUSS/DSCN MKR DOCD: CPT | Performed by: FAMILY MEDICINE

## 2022-11-15 RX ORDER — KETOCONAZOLE 20 MG/G
CREAM TOPICAL
Qty: 30 G | Refills: 1 | Status: SHIPPED | OUTPATIENT
Start: 2022-11-15

## 2022-11-15 SDOH — ECONOMIC STABILITY: FOOD INSECURITY: WITHIN THE PAST 12 MONTHS, YOU WORRIED THAT YOUR FOOD WOULD RUN OUT BEFORE YOU GOT MONEY TO BUY MORE.: NEVER TRUE

## 2022-11-15 SDOH — ECONOMIC STABILITY: FOOD INSECURITY: WITHIN THE PAST 12 MONTHS, THE FOOD YOU BOUGHT JUST DIDN'T LAST AND YOU DIDN'T HAVE MONEY TO GET MORE.: NEVER TRUE

## 2022-11-15 ASSESSMENT — ENCOUNTER SYMPTOMS
WHEEZING: 0
BLOOD IN STOOL: 0
CHEST TIGHTNESS: 0
APNEA: 0
SINUS PAIN: 0
SHORTNESS OF BREATH: 0
ABDOMINAL PAIN: 0
CONSTIPATION: 0
COUGH: 0
DIARRHEA: 0
VOMITING: 0
CHOKING: 0
NAUSEA: 0

## 2022-11-15 ASSESSMENT — SOCIAL DETERMINANTS OF HEALTH (SDOH): HOW HARD IS IT FOR YOU TO PAY FOR THE VERY BASICS LIKE FOOD, HOUSING, MEDICAL CARE, AND HEATING?: NOT HARD AT ALL

## 2022-11-15 NOTE — PROGRESS NOTES
[facility])  Family Medicine Outpatient        SUBJECTIVE:  CC: had concerns including Hyperlipidemia (Follow up.) and Fatigue (Pt states she is really tired in the afternoon. ). HPI:  Lauryn Tristan is a female 78 y.o. presented to the clinic for concerns of fatigue. She reports feeling tired for the past 2-3 months. She notes waking up and just not wanting to get out of bed. She reports her anxiety and depression are controlled. She is sleeping approximately 10-11pm and wakes up around 7am. She gets up in the middle of the night to use the restroom, but that is nothing new. Her weight is up 8 lb since her last appointment in August. She is eating, \"like a pig\" at home. Review of Systems   Constitutional:  Positive for fatigue. Negative for appetite change, chills and fever. HENT:  Negative for congestion and sinus pain. Respiratory:  Negative for apnea, cough, choking, chest tightness, shortness of breath and wheezing. Cardiovascular:  Negative for chest pain and palpitations. Gastrointestinal:  Negative for abdominal pain, blood in stool, constipation, diarrhea, nausea and vomiting. Genitourinary:  Negative for decreased urine volume, difficulty urinating, dysuria and frequency. Neurological:  Negative for syncope, weakness and headaches. Psychiatric/Behavioral:  Negative for suicidal ideas. The patient is nervous/anxious. Depression     Outpatient Medications Marked as Taking for the 11/15/22 encounter (Office Visit) with Argentina Otto MD   Medication Sig Dispense Refill    ketoconazole (NIZORAL) 2 % cream Apply 1 g Bid x 14 days over affected area then prn. 30 g 1    vitamin D (ERGOCALCIFEROL) 1.25 MG (88070 UT) CAPS capsule TAKE 1 CAPSULE BY MOUTH  ONCE WEEKLY 12 capsule 3    PARoxetine (PAXIL) 20 MG tablet Take 1 tablet by mouth in the morning.  90 tablet 1    atorvastatin (LIPITOR) 40 MG tablet TAKE 1 TABLET BY MOUTH AT  NIGHT 90 tablet 1    lamoTRIgine (LAMICTAL) 100 MG tablet Take 1 tablet by mouth in the morning and 1 tablet before bedtime. 180 tablet 1    levETIRAcetam (KEPPRA) 500 MG tablet TAKE 1 TABLET BY MOUTH  TWICE DAILY 180 tablet 1    aspirin 81 MG chewable tablet Take 1 tablet by mouth daily 90 tablet 0       I have reviewed all pertinent PMHx, PSHx, FamHx, SocialHx, medications, and allergies and updated history as appropriate. OBJECTIVE    VS: /68   Pulse 54   Temp 98 °F (36.7 °C)   Resp 16   Ht 5' 2\" (1.575 m)   Wt 134 lb (60.8 kg)   LMP  (LMP Unknown)   SpO2 96%   BMI 24.51 kg/m²   Physical Exam  Constitutional:       General: She is not in acute distress. Appearance: She is well-developed. She is not diaphoretic. HENT:      Head: Normocephalic and atraumatic. Comments: Left flaking left eyebrow and top of scalp  Eyes:      Conjunctiva/sclera: Conjunctivae normal.      Pupils: Pupils are equal, round, and reactive to light. Cardiovascular:      Rate and Rhythm: Normal rate and regular rhythm. Pulmonary:      Effort: Pulmonary effort is normal.      Breath sounds: Normal breath sounds. Abdominal:      General: Bowel sounds are normal. There is no distension. Palpations: Abdomen is soft. Tenderness: There is no abdominal tenderness. Hernia: No hernia is present. Musculoskeletal:      Cervical back: Normal range of motion and neck supple. Skin:     General: Skin is warm and dry. Neurological:      Mental Status: She is alert and oriented to person, place, and time. ASSESSMENT/PLAN:  1. Anxiety and depression  Reports being stable. No s/h ideations. Continue current regimen and to monitor. 2. Fatigue, unspecified type  Supportive treatment as discussed. Labs done in office today. - CBC; Future  - Comprehensive Metabolic Panel; Future  - Vitamin B12; Future  - Folate; Future  - TSH; Future  - MONONUCLEOSIS SCREEN; Future  - HIV Screen; Future    3. Vitamin D deficiency    4.  Flu vaccine need  - Influenza, ELENA, (age 72 y+), IM, Preservative Free, 0.5 mL    5. Seborrheic dermatitis  - ketoconazole (NIZORAL) 2 % cream; Apply 1 g Bid x 14 days over affected area then prn. Dispense: 30 g; Refill: 1    I have reviewed my findings and recommendations with John Penn MD  11/15/2022 2:13 PM  Return in about 3 months (around 2/15/2023). Counseled regarding above diagnosis, including possible risks and complications, especially if left uncontrolled. Patient counseled on red flag symptoms and if they occur to go to the ED. Discussed medications risk/benefits and possible side effects and alternatives to treatment. Patient and/or guardian verbalizes understanding, agrees, feels comfortable with and wishes to proceed with above treatment plan. Advised patient regarding importance of keeping up with recommended health maintenance and to schedule as soon as possible if overdue, as this is important in assessing for undiagnosed pathology, especially cancer, as well as protecting against potentially harmful/life threatening disease. Patient and/or guardian verbalizes understanding and agrees with above counseling, assessment and plan. All questions answered. Please note this report has been partially produced using speech recognition software  and may contain errors related to that system including grammar, punctuation and spelling as well as words and phrases that may seem inappropriate. If there are questions or concerns please feel free to contact me to clarify.

## 2022-11-16 LAB — MONO TEST: NEGATIVE

## 2022-11-21 LAB — HIV-1 AND HIV-2 ANTIBODIES: NORMAL

## 2022-11-22 DIAGNOSIS — R74.8 ELEVATED ALKALINE PHOSPHATASE LEVEL: Primary | ICD-10-CM

## 2022-12-14 DIAGNOSIS — Z98.890 HISTORY OF RESECTION OF MENINGIOMA: ICD-10-CM

## 2022-12-14 DIAGNOSIS — G40.909 SEIZURE DISORDER (HCC): ICD-10-CM

## 2022-12-14 DIAGNOSIS — E78.2 MIXED HYPERLIPIDEMIA: ICD-10-CM

## 2022-12-14 DIAGNOSIS — Z86.03 HISTORY OF RESECTION OF MENINGIOMA: ICD-10-CM

## 2022-12-14 RX ORDER — LAMOTRIGINE 100 MG/1
TABLET ORAL
Qty: 180 TABLET | Refills: 1 | Status: SHIPPED | OUTPATIENT
Start: 2022-12-14

## 2022-12-14 RX ORDER — ATORVASTATIN CALCIUM 40 MG/1
TABLET, FILM COATED ORAL
Qty: 90 TABLET | Refills: 1 | Status: SHIPPED | OUTPATIENT
Start: 2022-12-14

## 2022-12-14 RX ORDER — LEVETIRACETAM 500 MG/1
TABLET ORAL
Qty: 180 TABLET | Refills: 1 | Status: SHIPPED | OUTPATIENT
Start: 2022-12-14

## 2023-01-03 ENCOUNTER — HOSPITAL ENCOUNTER (OUTPATIENT)
Age: 78
Discharge: HOME | End: 2023-01-03
Payer: MEDICARE

## 2023-01-03 DIAGNOSIS — R53.83 FATIGUE, UNSPECIFIED TYPE: ICD-10-CM

## 2023-01-03 LAB
BASOPHILS ABSOLUTE: 0.04 E9/L (ref 0–0.2)
BASOPHILS RELATIVE PERCENT: 1 % (ref 0–2)
EOSINOPHILS ABSOLUTE: 0.17 E9/L (ref 0.05–0.5)
EOSINOPHILS RELATIVE PERCENT: 4.1 % (ref 0–6)
HCT VFR BLD CALC: 43.5 % (ref 34–48)
HEMOGLOBIN: 14.3 G/DL (ref 11.5–15.5)
IMMATURE GRANULOCYTES #: 0.01 E9/L
IMMATURE GRANULOCYTES %: 0.2 % (ref 0–5)
LYMPHOCYTES ABSOLUTE: 1.25 E9/L (ref 1.5–4)
LYMPHOCYTES RELATIVE PERCENT: 30.5 % (ref 20–42)
MCH RBC QN AUTO: 29.7 PG (ref 26–35)
MCHC RBC AUTO-ENTMCNC: 32.9 % (ref 32–34.5)
MCV RBC AUTO: 90.2 FL (ref 80–99.9)
MONOCYTES ABSOLUTE: 0.43 E9/L (ref 0.1–0.95)
MONOCYTES RELATIVE PERCENT: 10.5 % (ref 2–12)
NEUTROPHILS ABSOLUTE: 2.2 E9/L (ref 1.8–7.3)
NEUTROPHILS RELATIVE PERCENT: 53.7 % (ref 43–80)
PDW BLD-RTO: 14.3 FL (ref 11.5–15)
PLATELET # BLD: 320 E9/L (ref 130–450)
PMV BLD AUTO: 8.7 FL (ref 7–12)
RBC # BLD: 4.82 E12/L (ref 3.5–5.5)
WBC # BLD: 4.1 E9/L (ref 4.5–11.5)

## 2023-01-03 PROCEDURE — 36415 COLL VENOUS BLD VENIPUNCTURE: CPT

## 2023-01-03 PROCEDURE — 85025 COMPLETE CBC W/AUTO DIFF WBC: CPT

## 2023-01-12 DIAGNOSIS — F41.8 DEPRESSION WITH ANXIETY: ICD-10-CM

## 2023-01-13 RX ORDER — PAROXETINE HYDROCHLORIDE 20 MG/1
20 TABLET, FILM COATED ORAL DAILY
Qty: 90 TABLET | Refills: 1 | Status: SHIPPED
Start: 2023-01-13 | End: 2023-03-02 | Stop reason: SDUPTHER

## 2023-01-19 ENCOUNTER — OFFICE VISIT (OUTPATIENT)
Dept: ENDOCRINOLOGY | Age: 78
End: 2023-01-19
Payer: MEDICARE

## 2023-01-19 VITALS
DIASTOLIC BLOOD PRESSURE: 90 MMHG | RESPIRATION RATE: 15 BRPM | WEIGHT: 133 LBS | HEIGHT: 62 IN | HEART RATE: 69 BPM | BODY MASS INDEX: 24.48 KG/M2 | SYSTOLIC BLOOD PRESSURE: 146 MMHG | OXYGEN SATURATION: 98 %

## 2023-01-19 DIAGNOSIS — R74.8 ALKALINE PHOSPHATASE ELEVATION: ICD-10-CM

## 2023-01-19 DIAGNOSIS — M88.9 PAGET DISEASE OF BONE: ICD-10-CM

## 2023-01-19 DIAGNOSIS — E55.9 VITAMIN D DEFICIENCY: ICD-10-CM

## 2023-01-19 DIAGNOSIS — M81.0 OSTEOPOROSIS, UNSPECIFIED OSTEOPOROSIS TYPE, UNSPECIFIED PATHOLOGICAL FRACTURE PRESENCE: Primary | ICD-10-CM

## 2023-01-19 PROCEDURE — 99204 OFFICE O/P NEW MOD 45 MIN: CPT | Performed by: INTERNAL MEDICINE

## 2023-01-19 PROCEDURE — 1123F ACP DISCUSS/DSCN MKR DOCD: CPT | Performed by: INTERNAL MEDICINE

## 2023-01-19 NOTE — PROGRESS NOTES
[address] [facility] Department of Endocrinology Diabetes and Metabolism   [address] [facility] [address]   Phone: [phone]  Fax: [phone]    Date of Service: 1/19/2023    Primary Care Physician: Julissa Stallings MD.  Referring physician: Emily Mims   Provider: Jero Valencia MD        Reason for the visit:  Elevated Alkaline phosphatase, Osteoporosis     History of present illness: The history is provided by the patient. No  was used. Accuracy of the patient data is excellent  Katarzyna Nieves is a very pleasant 78 y.o. female seen today for evaluation of elevated Alk-Phos level    The patient was noted to have elevated Alk-Phos on a routine labs long time ago. I have reviewed her labs fr the past few years anf Alk-Phos has been consistently elevated   She denies any bone pain, hypercalcemia, kidney stones, headaches, hearing loss or fragility fractures      Previous work up in 1/2020 and 8/2022 showed normal GGT level      1/26/21 10:15 4/8/21 16:32 10/15/21 10:26 7/6/22 16:26 8/4/22 15:12 11/15/22 15:16   Alk Phos 122 (H) 137 (H) 112 (H) 148 (H) 134 (H) 154 (H)      10/15/21 10:26 2/21/22 15:20 7/6/22 16:26 8/4/22 15:12 11/15/22 15:16 1/20/23 17:10   Creatinine 0.8 0.8 1.0 0.9 0.8 0.8   GFR Non- >60 >60 54 >60     CALCIUM, SERUM, 611464 9.7 9.5 10.2 9.6 10.0 9.8   PTH      42   Vit D, 25-Hydroxy   23 (L)   48     DXA scan 2/2021:   Total Hip T-score of -3.0   Fem neck T-score of -3.0   middle third of the radius of the distal T score  -3.7      Currently not on antiresorptive therapy and also not on vitD     PAST MEDICAL HISTORY   Past Medical History:   Diagnosis Date    Cerebral artery occlusion with cerebral infarction [facility])     Hemiparesis affecting left side as late effect of stroke ([facility] [address] 12/4/2020    Hx of resection of meningioma 11/4/2021    Hyperlipidemia     Localization-related epilepsy ([facility] [address] 6/25/2019    [facility] 11/4/2021       PAST SURGICAL HISTORY   Past Surgical History:   Procedure Laterality Date    BRAIN MENINGIOMA EXCISION      BREAST SURGERY      HYSTERECTOMY (CERVIX STATUS UNKNOWN)      HYSTERECTOMY, VAGINAL  2014    Total    SAVITA STEROTACTIC LOC BREAST BIOPSY LEFT Left 2/16/2021    SAVITA STEROTACTIC LOC BREAST BIOPSY LEFT 2/16/2021 [facility]       SOCIAL HISTORY   Tobacco:   reports that she has never smoked. She has been exposed to tobacco smoke. She has never used smokeless tobacco.  Alcohol:   reports no history of alcohol use. Drugs:   reports no history of drug use. FAMILY HISTORY   No family history on file. ALLERGIES AND DRUG REACTIONS   No Known Allergies    CURRENT MEDICATIONS     Current Outpatient Medications   Medication Sig Dispense Refill    PARoxetine (PAXIL) 20 MG tablet TAKE 1 TABLET BY MOUTH IN  THE MORNING 90 tablet 1    lamoTRIgine (LAMICTAL) 100 MG tablet TAKE 1 TABLET BY MOUTH IN  THE MORNING AND 1 TABLET BY MOUTH BEFORE BEDTIME 180 tablet 1    atorvastatin (LIPITOR) 40 MG tablet TAKE 1 TABLET BY MOUTH AT  NIGHT 90 tablet 1    levETIRAcetam (KEPPRA) 500 MG tablet TAKE 1 TABLET BY MOUTH  TWICE DAILY 180 tablet 1    ketoconazole (NIZORAL) 2 % cream Apply 1 g Bid x 14 days over affected area then prn. 30 g 1    vitamin D (ERGOCALCIFEROL) 1.25 MG (26753 UT) CAPS capsule TAKE 1 CAPSULE BY MOUTH  ONCE WEEKLY 12 capsule 3    folic acid (FOLVITE) 1 MG tablet Take 1 tablet by mouth daily 90 tablet 1    aspirin 81 MG chewable tablet Take 1 tablet by mouth daily 90 tablet 0     No current facility-administered medications for this visit. Review of Systems    Constitutional: No fever, no chills, no diaphoresis, no generalized weakness. HEENT: No blurred vision, No sore throat, no ear pain, no hair loss  Neck: denied any neck swelling, difficulty swallowing,   Cadrdiopulomary: No CP, SOB or palpitation, No orthopnea or PND. No cough or wheezing.   GI: No N/V/D, no constipation, No abdominal pain, no melena or hematochezia   : Denied any dysuria, hematuria, flank pain, discharge, or incontinence. Skin: denied any rash, ulcer, Hirsute, or hyperpigmentation. MSK: denied any joint deformity, joint pain/swelling, muscle pain, or back pain. Neuro: no numbess, no tingling, no weakness, __  Psychiatric/Behavioral: Negative for sleep disturbance and dysphoric mood. The patient is not nervous/anxious. Objective:   BP (!) 146/90   Pulse 69   Resp 15   Ht 5' 2\" (1.575 m)   Wt 133 lb (60.3 kg)   LMP  (LMP Unknown)   SpO2 98%   BMI 24.33 kg/m²   BP Readings from Last 4 Encounters:   01/19/23 (!) 146/90   11/15/22 122/68   08/09/22 126/78   03/08/22 128/80     Wt Readings from Last 6 Encounters:   01/19/23 133 lb (60.3 kg)   11/15/22 134 lb (60.8 kg)   08/09/22 126 lb (57.2 kg)   06/30/22 126 lb (57.2 kg)   03/08/22 127 lb (57.6 kg)   11/04/21 120 lb (54.4 kg)       Physical examination:  General: awake alert, no abnormal position or movements. HEENT: normocephalic non traumatic. Neck: supple, no LN enlargement, no thyromegaly, no thyroid tenderness, no JVD. Pulm: clear equal air entry no added sounds,  CVS: S1 + S2, no murmur, no heave. Abd: soft lax no tenderness, no organomegaly, audible bowel sounds. MSK: no back deformity, no local spine tesnderness  Skin: warm, no lesions, no rash.  No striae no Bruises   Neuro: CN intact, sensation notmal , muscle power normal  Psych: normal mood, and affect     Lab review   I personally reviewed the following labs:   Lab Results   Component Value Date/Time    WBC 4.1 (L) 01/03/2023 03:45 PM    RBC 4.82 01/03/2023 03:45 PM    HGB 14.3 01/03/2023 03:45 PM    HCT 43.5 01/03/2023 03:45 PM    MCV 90.2 01/03/2023 03:45 PM    MCH 29.7 01/03/2023 03:45 PM    MCHC 32.9 01/03/2023 03:45 PM    RDW 14.3 01/03/2023 03:45 PM     01/03/2023 03:45 PM    MPV 8.7 01/03/2023 03:45 PM      Lab Results   Component Value Date/Time     11/15/2022 03:16 PM    K 4.2 11/15/2022 03:16 PM    K 3.9 12/14/2020 03:03 PM    CO2 29 11/15/2022 03:16 PM    BUN 19 11/15/2022 03:16 PM    CALCIUM 10.0 11/15/2022 03:16 PM      Lab Results   Component Value Date/Time    LABA1C 5.6 10/30/2019 12:00 AM    GLUCOSE 93 11/15/2022 03:16 PM     Lab Results   Component Value Date/Time    TSH 2.000 11/15/2022 03:16 PM    Q8ETAOQ 90.37 12/15/2020 10:53 AM     Lab Results   Component Value Date/Time    PTH 34 03/10/2020 08:45 AM     No results found for: MG  No results found for: PHOS  Lab Results   Component Value Date/Time    VITD25 23 07/06/2022 04:26 PM     No results found for: CALCITONIN  No results found for: PTHRP  No results found for: Taty Moody  No results found for: URINEVOLUME    Impression and plan:  Reyes Pink who is 78 y.o. female in the clinic today management of the following issues     Elevated Alk-Phos   Previous work up 8/2022 and 1/2020 showed normal GGT   VitD level was low on previous labs which can cause elevated Alk-Phos   Today will check VitD, PTH level, and Alk-Phos isoenzymes   Will also obtain NM bone scan as a work up for Paget's disease of the bone   Will follow results and proceed accordingly     Osteoporosis/VitD deficiency    DXA scan in 2021 showed sever osteoporosis. Pt not on treatment   Due for DEXA scan in few weeks   Will get anoth DXA scan in few weeks and proceed with Reclast infusion which will work for both Osteoporosis and Paget's disease of the bone     I personally reviewed external notes from PCP and other patient's care team providers, and personally interpreted labs associated with the above diagnosis. I also ordered labs to further assess and manage the above addressed medical conditions. Return in about 6 months (around 7/19/2023) for osteoporosis high ALk-Phone . The above issues were reviewed with the patient who understood and agreed with the plan.     Thank you for allowing us to participate in the care of this patient. Please do not hesitate to contact us with any additional questions. Diagnosis Orders   1. Osteoporosis, unspecified osteoporosis type, unspecified pathological fracture presence  PTH, Intact    Vitamin D 25 Hydroxy    Basic Metabolic Panel    DEXA BONE DENSITY AXIAL SKELETON    ALKALINE PHOSPHATASE, ISOENZYMES    zoledronic acid (RECLAST) 5 MG/100ML SOLN    DISCONTINUED: denosumab (PROLIA) 60 MG/ML SOSY SC injection      2. Alkaline phosphatase elevation  PTH, Intact    Vitamin D 25 Hydroxy    Basic Metabolic Panel    zoledronic acid (RECLAST) 5 MG/100ML SOLN    DISCONTINUED: denosumab (PROLIA) 60 MG/ML SOSY SC injection      3. Vitamin D deficiency        4. Paget disease of bone  NM BONE SCAN WHOLE BODY        Giana Torres MD  Endocrinologist, [facility])   [address] [place], [place] [id number]   Phone: [phone]  Fax: [phone]  ----------------------------  An electronic signature was used to authenticate this note.  Gracia Simmonds, MD on 1/19/2023 at 1:25 PM

## 2023-01-20 ENCOUNTER — HOSPITAL ENCOUNTER (OUTPATIENT)
Age: 78
Discharge: HOME | End: 2023-01-20
Payer: MEDICARE

## 2023-01-20 DIAGNOSIS — M81.0 OSTEOPOROSIS, UNSPECIFIED OSTEOPOROSIS TYPE, UNSPECIFIED PATHOLOGICAL FRACTURE PRESENCE: ICD-10-CM

## 2023-01-20 DIAGNOSIS — R74.8 ALKALINE PHOSPHATASE ELEVATION: ICD-10-CM

## 2023-01-20 LAB
ANION GAP SERPL CALCULATED.3IONS-SCNC: 11 MMOL/L (ref 7–16)
BUN BLDV-MCNC: 11 MG/DL (ref 6–23)
CALCIUM SERPL-MCNC: 9.8 MG/DL (ref 8.6–10.2)
CHLORIDE BLD-SCNC: 102 MMOL/L (ref 98–107)
CO2: 27 MMOL/L (ref 22–29)
CREAT SERPL-MCNC: 0.8 MG/DL (ref 0.5–1)
GFR SERPL CREATININE-BSD FRML MDRD: >60 ML/MIN/1.73
GLUCOSE BLD-MCNC: 93 MG/DL (ref 74–99)
POTASSIUM SERPL-SCNC: 4.4 MMOL/L (ref 3.5–5)
SODIUM BLD-SCNC: 140 MMOL/L (ref 132–146)

## 2023-01-20 PROCEDURE — 84075 ASSAY ALKALINE PHOSPHATASE: CPT

## 2023-01-20 PROCEDURE — 84080 ASSAY ALKALINE PHOSPHATASES: CPT

## 2023-01-20 PROCEDURE — 83970 ASSAY OF PARATHORMONE: CPT

## 2023-01-20 PROCEDURE — 36415 COLL VENOUS BLD VENIPUNCTURE: CPT

## 2023-01-20 PROCEDURE — 82306 VITAMIN D 25 HYDROXY: CPT

## 2023-01-20 PROCEDURE — 80048 BASIC METABOLIC PNL TOTAL CA: CPT

## 2023-01-21 LAB
PARATHYROID HORMONE INTACT: 42 PG/ML (ref 15–65)
VITAMIN D 25-HYDROXY: 48 NG/ML (ref 30–100)

## 2023-01-21 RX ORDER — ERGOCALCIFEROL 1.25 MG/1
CAPSULE ORAL
Qty: 12 CAPSULE | Refills: 3 | Status: SHIPPED | OUTPATIENT
Start: 2023-01-21

## 2023-01-22 ENCOUNTER — TELEPHONE (OUTPATIENT)
Dept: ENDOCRINOLOGY | Age: 78
End: 2023-01-22

## 2023-01-22 RX ORDER — ZOLEDRONIC ACID 5 MG/100ML
5 INJECTION, SOLUTION INTRAVENOUS ONCE
Qty: 100 ML | Refills: 0 | Status: SHIPPED | OUTPATIENT
Start: 2023-01-22 | End: 2023-01-22

## 2023-01-22 NOTE — TELEPHONE ENCOUNTER
Endocrine staff,  I want this pt to get Reclast infusion not Prolia to help with osteoporosis and possible Paget's disease of the bone    Also, please schedule her to NM bone scan

## 2023-01-23 DIAGNOSIS — M88.9 PAGET DISEASE OF BONE: Primary | ICD-10-CM

## 2023-01-23 DIAGNOSIS — M81.0 OSTEOPOROSIS, UNSPECIFIED OSTEOPOROSIS TYPE, UNSPECIFIED PATHOLOGICAL FRACTURE PRESENCE: ICD-10-CM

## 2023-01-23 DIAGNOSIS — R74.8 ALKALINE PHOSPHATASE ELEVATION: ICD-10-CM

## 2023-01-23 NOTE — TELEPHONE ENCOUNTER
Called and informed pt. She will call and schedule scans.  Faxing order to infusion center as soon as Dr Davila Smiling out the script

## 2023-01-24 RX ORDER — ZOLEDRONIC ACID 5 MG/100ML
5 INJECTION, SOLUTION INTRAVENOUS ONCE
Qty: 100 ML | Refills: 0 | Status: SHIPPED | OUTPATIENT
Start: 2023-01-24 | End: 2023-01-24

## 2023-01-27 ENCOUNTER — TELEPHONE (OUTPATIENT)
Dept: ENDOCRINOLOGY | Age: 78
End: 2023-01-27

## 2023-01-27 RX ORDER — ZOLEDRONIC ACID 5 MG/100ML
5 INJECTION, SOLUTION INTRAVENOUS ONCE
OUTPATIENT
Start: 2023-01-27 | End: 2023-01-27

## 2023-01-27 NOTE — TELEPHONE ENCOUNTER
Endocrine staff,   Please notify pt that I have reviewed your recent results. All results are very good. Alkaline phosphatase still slightly elevated. As we dicussed at recent OV, elevated Alk-Phos could can be from Liver or bone.   One of result this time suggested liver source but I am still waiting for Bone scan to help

## 2023-01-31 ENCOUNTER — TELEPHONE (OUTPATIENT)
Dept: PHARMACY | Age: 78
End: 2023-01-31

## 2023-01-31 NOTE — TELEPHONE ENCOUNTER
Spoke with Ghislaine Street from Dr Darling Brown office regarding orders received for Prolia and Reclast. 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 Orders received to discontinue Prolia.      Lloyd [facility]

## 2023-02-21 ENCOUNTER — TELEPHONE (OUTPATIENT)
Dept: FAMILY MEDICINE CLINIC | Age: 78
End: 2023-02-21

## 2023-02-21 DIAGNOSIS — M81.0 OSTEOPOROSIS WITHOUT CURRENT PATHOLOGICAL FRACTURE, UNSPECIFIED OSTEOPOROSIS TYPE: Primary | ICD-10-CM

## 2023-02-21 NOTE — TELEPHONE ENCOUNTER
Pt called stating that she needs a BMP ordered prior to her 2/23 Reclast infusion. Return call to pt at [phone] when order is placed.     Electronically signed by JILLIAN Ford on 2/21/23 at 3:18 PM EST

## 2023-02-22 ENCOUNTER — HOSPITAL ENCOUNTER (OUTPATIENT)
Age: 78
Discharge: HOME | End: 2023-02-22
Payer: MEDICARE

## 2023-02-22 DIAGNOSIS — M81.0 OSTEOPOROSIS WITHOUT CURRENT PATHOLOGICAL FRACTURE, UNSPECIFIED OSTEOPOROSIS TYPE: ICD-10-CM

## 2023-02-22 LAB
ANION GAP SERPL CALCULATED.3IONS-SCNC: 11 MMOL/L (ref 7–16)
BUN BLDV-MCNC: 15 MG/DL (ref 6–23)
CALCIUM SERPL-MCNC: 10 MG/DL (ref 8.6–10.2)
CHLORIDE BLD-SCNC: 103 MMOL/L (ref 98–107)
CO2: 28 MMOL/L (ref 22–29)
CREAT SERPL-MCNC: 0.8 MG/DL (ref 0.5–1)
GFR SERPL CREATININE-BSD FRML MDRD: >60 ML/MIN/1.73
GLUCOSE BLD-MCNC: 112 MG/DL (ref 74–99)
POTASSIUM SERPL-SCNC: 3.9 MMOL/L (ref 3.5–5)
SODIUM BLD-SCNC: 142 MMOL/L (ref 132–146)

## 2023-02-22 PROCEDURE — 36415 COLL VENOUS BLD VENIPUNCTURE: CPT

## 2023-02-22 PROCEDURE — 80048 BASIC METABOLIC PNL TOTAL CA: CPT

## 2023-02-22 NOTE — TELEPHONE ENCOUNTER
This MA attempted to return call to pt. No answer. This MA left message for pt advising lab order is in chart.      Electronically signed by Alex Bennett MA on 2/22/23 at 3:29 PM EST

## 2023-02-23 ENCOUNTER — HOSPITAL ENCOUNTER (OUTPATIENT)
Dept: INFUSION THERAPY | Age: 78
Setting detail: INFUSION SERIES
Discharge: HOME | End: 2023-02-23
Payer: MEDICARE

## 2023-02-23 VITALS
TEMPERATURE: 98.5 F | RESPIRATION RATE: 16 BRPM | SYSTOLIC BLOOD PRESSURE: 140 MMHG | DIASTOLIC BLOOD PRESSURE: 82 MMHG | OXYGEN SATURATION: 97 % | HEART RATE: 65 BPM

## 2023-02-23 DIAGNOSIS — M81.0 AGE-RELATED OSTEOPOROSIS WITHOUT CURRENT PATHOLOGICAL FRACTURE: Primary | ICD-10-CM

## 2023-02-23 PROCEDURE — 2580000003 HC RX 258: Performed by: INTERNAL MEDICINE

## 2023-02-23 PROCEDURE — 96365 THER/PROPH/DIAG IV INF INIT: CPT

## 2023-02-23 PROCEDURE — 2580000003 HC RX 258: Performed by: FAMILY MEDICINE

## 2023-02-23 PROCEDURE — 6360000002 HC RX W HCPCS: Performed by: INTERNAL MEDICINE

## 2023-02-23 RX ORDER — SODIUM CHLORIDE 0.9 % (FLUSH) 0.9 %
5-40 SYRINGE (ML) INJECTION PRN
OUTPATIENT
Start: 2023-02-23

## 2023-02-23 RX ORDER — SODIUM CHLORIDE 0.9 % (FLUSH) 0.9 %
5-40 SYRINGE (ML) INJECTION PRN
Status: DISCONTINUED | OUTPATIENT
Start: 2023-02-23 | End: 2023-02-24 | Stop reason: HOSPADM

## 2023-02-23 RX ORDER — ZOLEDRONIC ACID 5 MG/100ML
5 INJECTION, SOLUTION INTRAVENOUS ONCE
Status: DISCONTINUED | OUTPATIENT
Start: 2023-02-23 | End: 2023-02-23

## 2023-02-23 RX ORDER — ZOLEDRONIC ACID 5 MG/100ML
5 INJECTION, SOLUTION INTRAVENOUS ONCE
Status: CANCELLED | OUTPATIENT
Start: 2023-02-23 | End: 2023-02-23

## 2023-02-23 RX ADMIN — SODIUM CHLORIDE, PRESERVATIVE FREE 10 ML: 5 INJECTION INTRAVENOUS at 13:55

## 2023-02-23 RX ADMIN — SODIUM CHLORIDE, PRESERVATIVE FREE 10 ML: 5 INJECTION INTRAVENOUS at 14:30

## 2023-02-23 RX ADMIN — ZOLEDRONIC ACID 5 MG: 4 INJECTION, SOLUTION, CONCENTRATE INTRAVENOUS at 13:59

## 2023-02-23 NOTE — PROGRESS NOTES
Patient tolerated zometa infusion well. She states she sees a dentist regularly and denies any current dental or jaw issues. Remained on unit for 1 hour after infusion. Patient did develop low grade temp after infusion at 99.1. It seemed to come down to baseline prior to d/c to 98.4. She was instructed to monitor temp and if she continued to have one at home to take tylenol or ibuprofen. She states understanding. Patient alert and oriented x3. No distress noted. Vital signs stable. Patient denies any new or worsening pain. Educated patient on possible side effects and treatment of medication. Patient verbalized understanding. Offered patient education and/or discharge material. Patient provided d/c instructions but declined education handout on medication-has received reclast in the past- 2 times. Patient denies any needs. All questions answered. D/C in stable condition.

## 2023-03-02 ENCOUNTER — OFFICE VISIT (OUTPATIENT)
Dept: FAMILY MEDICINE CLINIC | Age: 78
End: 2023-03-02
Payer: MEDICARE

## 2023-03-02 ENCOUNTER — HOSPITAL ENCOUNTER (OUTPATIENT)
Age: 78
Discharge: HOME | End: 2023-03-02
Payer: MEDICARE

## 2023-03-02 VITALS
DIASTOLIC BLOOD PRESSURE: 86 MMHG | WEIGHT: 132 LBS | TEMPERATURE: 97.5 F | RESPIRATION RATE: 18 BRPM | HEIGHT: 62 IN | HEART RATE: 66 BPM | SYSTOLIC BLOOD PRESSURE: 138 MMHG | BODY MASS INDEX: 24.29 KG/M2 | OXYGEN SATURATION: 95 %

## 2023-03-02 DIAGNOSIS — M81.0 AGE-RELATED OSTEOPOROSIS WITHOUT CURRENT PATHOLOGICAL FRACTURE: ICD-10-CM

## 2023-03-02 DIAGNOSIS — F41.8 DEPRESSION WITH ANXIETY: Primary | ICD-10-CM

## 2023-03-02 DIAGNOSIS — I69.354 HEMIPARESIS AFFECTING LEFT SIDE AS LATE EFFECT OF STROKE (HCC): ICD-10-CM

## 2023-03-02 DIAGNOSIS — G40.109 PARTIAL SEIZURE DISORDER (HCC): ICD-10-CM

## 2023-03-02 DIAGNOSIS — R74.8 ELEVATED ALKALINE PHOSPHATASE LEVEL: ICD-10-CM

## 2023-03-02 DIAGNOSIS — D72.819 LEUKOPENIA, UNSPECIFIED TYPE: ICD-10-CM

## 2023-03-02 DIAGNOSIS — E55.9 VITAMIN D DEFICIENCY: ICD-10-CM

## 2023-03-02 LAB
BASOPHILS ABSOLUTE: 0.02 E9/L (ref 0–0.2)
BASOPHILS RELATIVE PERCENT: 0.4 % (ref 0–2)
EOSINOPHILS ABSOLUTE: 0.09 E9/L (ref 0.05–0.5)
EOSINOPHILS RELATIVE PERCENT: 1.8 % (ref 0–6)
HCT VFR BLD CALC: 43.6 % (ref 34–48)
HEMOGLOBIN: 13.9 G/DL (ref 11.5–15.5)
IMMATURE GRANULOCYTES #: 0.02 E9/L
IMMATURE GRANULOCYTES %: 0.4 % (ref 0–5)
LYMPHOCYTES ABSOLUTE: 1.35 E9/L (ref 1.5–4)
LYMPHOCYTES RELATIVE PERCENT: 27.5 % (ref 20–42)
MCH RBC QN AUTO: 28.9 PG (ref 26–35)
MCHC RBC AUTO-ENTMCNC: 31.9 % (ref 32–34.5)
MCV RBC AUTO: 90.6 FL (ref 80–99.9)
MONOCYTES ABSOLUTE: 0.43 E9/L (ref 0.1–0.95)
MONOCYTES RELATIVE PERCENT: 8.8 % (ref 2–12)
NEUTROPHILS ABSOLUTE: 3 E9/L (ref 1.8–7.3)
NEUTROPHILS RELATIVE PERCENT: 61.1 % (ref 43–80)
PDW BLD-RTO: 14.6 FL (ref 11.5–15)
PLATELET # BLD: 303 E9/L (ref 130–450)
PMV BLD AUTO: 8.9 FL (ref 7–12)
RBC # BLD: 4.81 E12/L (ref 3.5–5.5)
WBC # BLD: 4.9 E9/L (ref 4.5–11.5)

## 2023-03-02 PROCEDURE — 85025 COMPLETE CBC W/AUTO DIFF WBC: CPT

## 2023-03-02 PROCEDURE — 99214 OFFICE O/P EST MOD 30 MIN: CPT | Performed by: FAMILY MEDICINE

## 2023-03-02 PROCEDURE — 1123F ACP DISCUSS/DSCN MKR DOCD: CPT | Performed by: FAMILY MEDICINE

## 2023-03-02 PROCEDURE — 36415 COLL VENOUS BLD VENIPUNCTURE: CPT | Performed by: FAMILY MEDICINE

## 2023-03-02 PROCEDURE — 36415 COLL VENOUS BLD VENIPUNCTURE: CPT

## 2023-03-02 RX ORDER — PAROXETINE HYDROCHLORIDE 20 MG/1
20 TABLET, FILM COATED ORAL DAILY
Qty: 90 TABLET | Refills: 1 | Status: SHIPPED | OUTPATIENT
Start: 2023-03-02

## 2023-03-02 RX ORDER — MELATONIN
2000 DAILY
Qty: 180 TABLET | Refills: 1 | Status: SHIPPED | OUTPATIENT
Start: 2023-03-02

## 2023-03-02 RX ORDER — PAROXETINE HYDROCHLORIDE 20 MG/1
20 TABLET, FILM COATED ORAL DAILY
Qty: 90 TABLET | Refills: 1 | Status: SHIPPED
Start: 2023-03-02 | End: 2023-03-02

## 2023-03-02 SDOH — ECONOMIC STABILITY: INCOME INSECURITY: HOW HARD IS IT FOR YOU TO PAY FOR THE VERY BASICS LIKE FOOD, HOUSING, MEDICAL CARE, AND HEATING?: NOT HARD AT ALL

## 2023-03-02 SDOH — ECONOMIC STABILITY: FOOD INSECURITY: WITHIN THE PAST 12 MONTHS, THE FOOD YOU BOUGHT JUST DIDN'T LAST AND YOU DIDN'T HAVE MONEY TO GET MORE.: NEVER TRUE

## 2023-03-02 SDOH — ECONOMIC STABILITY: FOOD INSECURITY: WITHIN THE PAST 12 MONTHS, YOU WORRIED THAT YOUR FOOD WOULD RUN OUT BEFORE YOU GOT MONEY TO BUY MORE.: NEVER TRUE

## 2023-03-02 SDOH — ECONOMIC STABILITY: HOUSING INSECURITY
IN THE LAST 12 MONTHS, WAS THERE A TIME WHEN YOU DID NOT HAVE A STEADY PLACE TO SLEEP OR SLEPT IN A SHELTER (INCLUDING NOW)?: NO

## 2023-03-02 ASSESSMENT — PATIENT HEALTH QUESTIONNAIRE - PHQ9
SUM OF ALL RESPONSES TO PHQ QUESTIONS 1-9: 0
SUM OF ALL RESPONSES TO PHQ9 QUESTIONS 1 & 2: 0
2. FEELING DOWN, DEPRESSED OR HOPELESS: 0
3. TROUBLE FALLING OR STAYING ASLEEP: 0
SUM OF ALL RESPONSES TO PHQ QUESTIONS 1-9: 0
5. POOR APPETITE OR OVEREATING: 0
6. FEELING BAD ABOUT YOURSELF - OR THAT YOU ARE A FAILURE OR HAVE LET YOURSELF OR YOUR FAMILY DOWN: 0
10. IF YOU CHECKED OFF ANY PROBLEMS, HOW DIFFICULT HAVE THESE PROBLEMS MADE IT FOR YOU TO DO YOUR WORK, TAKE CARE OF THINGS AT HOME, OR GET ALONG WITH OTHER PEOPLE: 0
SUM OF ALL RESPONSES TO PHQ QUESTIONS 1-9: 0
9. THOUGHTS THAT YOU WOULD BE BETTER OFF DEAD, OR OF HURTING YOURSELF: 0
1. LITTLE INTEREST OR PLEASURE IN DOING THINGS: 0
7. TROUBLE CONCENTRATING ON THINGS, SUCH AS READING THE NEWSPAPER OR WATCHING TELEVISION: 0
4. FEELING TIRED OR HAVING LITTLE ENERGY: 0
SUM OF ALL RESPONSES TO PHQ QUESTIONS 1-9: 0
8. MOVING OR SPEAKING SO SLOWLY THAT OTHER PEOPLE COULD HAVE NOTICED. OR THE OPPOSITE, BEING SO FIGETY OR RESTLESS THAT YOU HAVE BEEN MOVING AROUND A LOT MORE THAN USUAL: 0

## 2023-03-02 ASSESSMENT — LIFESTYLE VARIABLES
HOW OFTEN DO YOU HAVE A DRINK CONTAINING ALCOHOL: NEVER
HOW MANY STANDARD DRINKS CONTAINING ALCOHOL DO YOU HAVE ON A TYPICAL DAY: PATIENT DOES NOT DRINK

## 2023-03-02 NOTE — PROGRESS NOTES
[facility] Outpatient        SUBJECTIVE:  CC: had concerns including Anxiety (Pt here for follow up.) and Medication Refill (She said pharmacy told her she needs a new script for Paxil).  HPI:  Cristiane Larry is a female 77 y.o. presented to the clinic for an establish visit.  She was last seen in November.  She reports doing well on Paxil.    Review of Systems   Constitutional:  Negative for appetite change, fatigue and fever.   Respiratory:  Negative for cough, shortness of breath and wheezing.    Cardiovascular:  Negative for chest pain and palpitations.   Gastrointestinal:  Negative for abdominal pain, constipation, diarrhea, nausea and vomiting.   Psychiatric/Behavioral:  Negative for suicidal ideas. The patient is nervous/anxious.         Depression     Outpatient Medications Marked as Taking for the 3/2/23 encounter (Office Visit) with Sylwia Almaraz MD   Medication Sig Dispense Refill    vitamin D3 (CHOLECALCIFEROL) 25 MCG (1000 UT) TABS tablet Take 2 tablets by mouth daily 180 tablet 1    PARoxetine (PAXIL) 20 MG tablet Take 1 tablet by mouth daily 90 tablet 1    lamoTRIgine (LAMICTAL) 100 MG tablet TAKE 1 TABLET BY MOUTH IN  THE MORNING AND 1 TABLET BY MOUTH BEFORE BEDTIME 180 tablet 1    atorvastatin (LIPITOR) 40 MG tablet TAKE 1 TABLET BY MOUTH AT  NIGHT 90 tablet 1    levETIRAcetam (KEPPRA) 500 MG tablet TAKE 1 TABLET BY MOUTH  TWICE DAILY 180 tablet 1    folic acid (FOLVITE) 1 MG tablet Take 1 tablet by mouth daily 90 tablet 1    aspirin 81 MG chewable tablet Take 1 tablet by mouth daily 90 tablet 0       I have reviewed all pertinent PMHx, PSHx, FamHx, SocialHx, medications, and allergies and updated history as appropriate.    OBJECTIVE    VS: /86   Pulse 66   Temp 97.5 °F (36.4 °C)   Resp 18   Ht 5' 2\" (1.575 m)   Wt 132 lb (59.9 kg)   LMP  (LMP Unknown)   SpO2 95%   BMI 24.14 kg/m²   Physical Exam  Constitutional:       General: She is not in acute distress.      Appearance: She is well-developed. She is not diaphoretic. HENT:      Head: Normocephalic and atraumatic. Eyes:      Conjunctiva/sclera: Conjunctivae normal.      Pupils: Pupils are equal, round, and reactive to light. Cardiovascular:      Rate and Rhythm: Normal rate and regular rhythm. Pulmonary:      Effort: Pulmonary effort is normal.      Breath sounds: Normal breath sounds. Abdominal:      General: Bowel sounds are normal. There is no distension. Palpations: Abdomen is soft. Tenderness: There is no abdominal tenderness. Hernia: No hernia is present. Musculoskeletal:      Cervical back: Normal range of motion and neck supple. Skin:     General: Skin is warm and dry. Neurological:      Mental Status: She is alert and oriented to person, place, and time. ASSESSMENT/PLAN:  1. Depression with anxiety  Stable. Continue current regimen. - PARoxetine (PAXIL) 20 MG tablet; Take 1 tablet by mouth daily  Dispense: 90 tablet; Refill: 1    2. Age-related osteoporosis without current pathological fracture  Last received Zometa 2/23/23. Prior to this was on Reclast, which was last received 2/23/2022. Alk phos isoenzymes 1/20 showed liver fraction increase. Updated DEXA ordered by endocrinology and pending at this time. Last DEXA scan 2021.    3. Elevated alkaline phosphatase level  Patient following with endocrinology for this. DEXA as well as NM whole-body scan pending at this time. 4. Leukopenia, unspecified type  Updated labs ordered and done today. - CBC with Auto Differential; Future  - Path Review, Smear; Future    5. Vitamin D deficiency  - vitamin D3 (CHOLECALCIFEROL) 25 MCG (1000 UT) TABS tablet; Take 2 tablets by mouth daily  Dispense: 180 tablet; Refill: 1    6. Hemiparesis affecting left side as late effect of stroke (HCC)    7. Partial seizure disorder (HCC)  Stable. Continue current regimen. Follows with Neurology.      I have reviewed my findings and recommendations with Alicia Jackson MD  3/13/2023 8:50 AM  Return in about 4 months (around 7/2/2023). Counseled regarding above diagnosis, including possible risks and complications, especially if left uncontrolled. Patient counseled on red flag symptoms and if they occur to go to the ED. Discussed medications risk/benefits and possible side effects and alternatives to treatment. Patient and/or guardian verbalizes understanding, agrees, feels comfortable with and wishes to proceed with above treatment plan. Advised patient regarding importance of keeping up with recommended health maintenance and to schedule as soon as possible if overdue, as this is important in assessing for undiagnosed pathology, especially cancer, as well as protecting against potentially harmful/life threatening disease. Patient and/or guardian verbalizes understanding and agrees with above counseling, assessment and plan. All questions answered. Please note this report has been partially produced using speech recognition software  and may contain errors related to that system including grammar, punctuation and spelling as well as words and phrases that may seem inappropriate. If there are questions or concerns please feel free to contact me to clarify.

## 2023-03-03 LAB — PATHOLOGIST REVIEW: NORMAL

## 2023-03-13 ASSESSMENT — ENCOUNTER SYMPTOMS
COUGH: 0
VOMITING: 0
CONSTIPATION: 0
SHORTNESS OF BREATH: 0
WHEEZING: 0
ABDOMINAL PAIN: 0
DIARRHEA: 0
NAUSEA: 0

## 2023-05-04 NOTE — TELEPHONE ENCOUNTER
Pt called for refill - meds were previously sent for 90 days with a refill. MA contacted pt and notified her to contact her pharmacy for the refills. Pt verbalized she understood.     Electronically signed by Mark Aggarwal MA on 1/9/20 at 3:39 PM Split-Thickness Skin Graft Text: The defect edges were debeveled with a #15 scalpel blade.  Given the location of the defect, shape of the defect and the proximity to free margins a split thickness skin graft was deemed most appropriate.  Using a sterile surgical marker, the primary defect shape was transferred to the donor site. The split thickness graft was then harvested.  The skin graft was then placed in the primary defect and oriented appropriately.

## 2023-06-06 ENCOUNTER — OFFICE VISIT (OUTPATIENT)
Dept: FAMILY MEDICINE CLINIC | Age: 78
End: 2023-06-06

## 2023-06-06 VITALS
BODY MASS INDEX: 24.95 KG/M2 | SYSTOLIC BLOOD PRESSURE: 130 MMHG | TEMPERATURE: 97.6 F | HEART RATE: 74 BPM | HEIGHT: 62 IN | RESPIRATION RATE: 18 BRPM | WEIGHT: 135.6 LBS | OXYGEN SATURATION: 97 % | DIASTOLIC BLOOD PRESSURE: 98 MMHG

## 2023-06-06 DIAGNOSIS — R25.1 TREMOR: ICD-10-CM

## 2023-06-06 DIAGNOSIS — F41.8 DEPRESSION WITH ANXIETY: ICD-10-CM

## 2023-06-06 DIAGNOSIS — R73.09 ELEVATED GLUCOSE: ICD-10-CM

## 2023-06-06 DIAGNOSIS — R49.0 HOARSENESS OF VOICE: ICD-10-CM

## 2023-06-06 DIAGNOSIS — I10 ESSENTIAL HYPERTENSION: Primary | ICD-10-CM

## 2023-06-06 DIAGNOSIS — G47.00 INSOMNIA, UNSPECIFIED TYPE: ICD-10-CM

## 2023-06-06 DIAGNOSIS — I69.354 HEMIPARESIS AFFECTING LEFT SIDE AS LATE EFFECT OF STROKE (HCC): Chronic | ICD-10-CM

## 2023-06-06 DIAGNOSIS — R53.83 FATIGUE, UNSPECIFIED TYPE: ICD-10-CM

## 2023-06-06 RX ORDER — PAROXETINE 10 MG/1
10 TABLET, FILM COATED ORAL DAILY
Qty: 90 TABLET | Refills: 0 | Status: SHIPPED | OUTPATIENT
Start: 2023-06-06

## 2023-06-06 ASSESSMENT — ENCOUNTER SYMPTOMS
SHORTNESS OF BREATH: 0
BLOOD IN STOOL: 0
COUGH: 0
DIARRHEA: 0
NAUSEA: 0
CONSTIPATION: 0
ABDOMINAL PAIN: 0
VOMITING: 0
WHEEZING: 0

## 2023-06-06 NOTE — PROGRESS NOTES
person, place, and time. Mental status is at baseline. Comments: Stable strength. Rhomberg negative       ASSESSMENT/PLAN:  1. Essential hypertension  Elevated. Low sodium diet advised. EKG and updated labs placed. Continue to monitor.   - EKG 12 lead; Future  - CBC; Future  - Comprehensive Metabolic Panel; Future  - TSH; Future  - T4, Free; Future    2. Fatigue, unspecified type  Sleep hygiene discussed. Melatonin did not help. Ok to trial otc Doxylamine. 3. Tremor  Deescalate Paxil from 20 to 10 mg/qd. Continue to monitor. 4. Hemiparesis affecting left side as late effect of stroke ([facility] [address]    5. Hoarseness of voice  - Sathish Felicita., DO, Otolaryngology, [place]    6. Elevated glucose  - Hemoglobin A1C; Future    7. Depression with anxiety  - PARoxetine (PAXIL) 10 MG tablet; Take 1 tablet by mouth daily  Dispense: 90 tablet; Refill: 0    8. Insomnia, unspecified type  - doxyLAMINE succinate (GNP SLEEP AID) 25 MG tablet; Take 1 tablet by mouth nightly as needed for Sleep  Dispense: 30 tablet; Refill: 0      I have reviewed my findings and recommendations with Brian Rubio MD  6/11/2023 10:17 PM  Return in about 4 weeks (around 7/4/2023). Counseled regarding above diagnosis, including possible risks and complications, especially if left uncontrolled. Patient counseled on red flag symptoms and if they occur to go to the ED. Discussed medications risk/benefits and possible side effects and alternatives to treatment. Patient and/or guardian verbalizes understanding, agrees, feels comfortable with and wishes to proceed with above treatment plan. Advised patient regarding importance of keeping up with recommended health maintenance and to schedule as soon as possible if overdue, as this is important in assessing for undiagnosed pathology, especially cancer, as well as protecting against potentially harmful/life threatening disease.        Patient and/or

## 2023-06-07 ENCOUNTER — HOSPITAL ENCOUNTER (OUTPATIENT)
Age: 78
Discharge: HOME | End: 2023-06-07
Payer: MEDICARE

## 2023-06-07 ENCOUNTER — HOSPITAL ENCOUNTER (OUTPATIENT)
Age: 78
End: 2023-06-07
Payer: MEDICARE

## 2023-06-07 DIAGNOSIS — R73.09 ELEVATED GLUCOSE: ICD-10-CM

## 2023-06-07 DIAGNOSIS — I10 ESSENTIAL HYPERTENSION: ICD-10-CM

## 2023-06-07 LAB
ALBUMIN SERPL-MCNC: 4.4 G/DL (ref 3.5–5.2)
ALP SERPL-CCNC: 166 U/L (ref 35–104)
ALT SERPL-CCNC: 14 U/L (ref 0–32)
ANION GAP SERPL CALCULATED.3IONS-SCNC: 10 MMOL/L (ref 7–16)
AST SERPL-CCNC: 22 U/L (ref 0–31)
BILIRUB SERPL-MCNC: 0.5 MG/DL (ref 0–1.2)
BUN SERPL-MCNC: 13 MG/DL (ref 6–23)
CALCIUM SERPL-MCNC: 9.7 MG/DL (ref 8.6–10.2)
CHLORIDE SERPL-SCNC: 103 MMOL/L (ref 98–107)
CO2 SERPL-SCNC: 26 MMOL/L (ref 22–29)
CREAT SERPL-MCNC: 0.9 MG/DL (ref 0.5–1)
ERYTHROCYTE [DISTWIDTH] IN BLOOD BY AUTOMATED COUNT: 14.7 FL (ref 11.5–15)
GLUCOSE SERPL-MCNC: 93 MG/DL (ref 74–99)
HCT VFR BLD AUTO: 42.1 % (ref 34–48)
HGB BLD-MCNC: 13.7 G/DL (ref 11.5–15.5)
MCH RBC QN AUTO: 29.5 PG (ref 26–35)
MCHC RBC AUTO-ENTMCNC: 32.5 % (ref 32–34.5)
MCV RBC AUTO: 90.5 FL (ref 80–99.9)
PLATELET # BLD AUTO: 292 E9/L (ref 130–450)
PMV BLD AUTO: 8.9 FL (ref 7–12)
POTASSIUM SERPL-SCNC: 4.5 MMOL/L (ref 3.5–5)
PROT SERPL-MCNC: 7.8 G/DL (ref 6.4–8.3)
RBC # BLD AUTO: 4.65 E12/L (ref 3.5–5.5)
SODIUM SERPL-SCNC: 139 MMOL/L (ref 132–146)
WBC # BLD: 4.1 E9/L (ref 4.5–11.5)

## 2023-06-07 PROCEDURE — 84443 ASSAY THYROID STIM HORMONE: CPT

## 2023-06-07 PROCEDURE — 80053 COMPREHEN METABOLIC PANEL: CPT

## 2023-06-07 PROCEDURE — 84439 ASSAY OF FREE THYROXINE: CPT

## 2023-06-07 PROCEDURE — 85027 COMPLETE CBC AUTOMATED: CPT

## 2023-06-07 PROCEDURE — 36415 COLL VENOUS BLD VENIPUNCTURE: CPT

## 2023-06-07 PROCEDURE — 83036 HEMOGLOBIN GLYCOSYLATED A1C: CPT

## 2023-06-07 PROCEDURE — 93005 ELECTROCARDIOGRAM TRACING: CPT | Performed by: FAMILY MEDICINE

## 2023-06-08 LAB
HBA1C MFR BLD: 5.7 % (ref 4–5.6)
T4 FREE SERPL-MCNC: 0.97 NG/DL (ref 0.93–1.7)
TSH SERPL-MCNC: 2.68 UIU/ML (ref 0.27–4.2)

## 2023-06-09 LAB
EKG ATRIAL RATE: 63 BPM
EKG P AXIS: 44 DEGREES
EKG P-R INTERVAL: 130 MS
EKG Q-T INTERVAL: 456 MS
EKG QRS DURATION: 92 MS
EKG QTC CALCULATION (BAZETT): 466 MS
EKG R AXIS: 24 DEGREES
EKG T AXIS: 38 DEGREES
EKG VENTRICULAR RATE: 63 BPM

## 2023-06-09 PROCEDURE — 93010 ELECTROCARDIOGRAM REPORT: CPT | Performed by: INTERNAL MEDICINE

## 2023-06-11 ASSESSMENT — ENCOUNTER SYMPTOMS
SINUS PAIN: 0
VOICE CHANGE: 1

## 2023-06-20 ENCOUNTER — TELEPHONE (OUTPATIENT)
Dept: FAMILY MEDICINE CLINIC | Age: 78
End: 2023-06-20

## 2023-07-18 ENCOUNTER — OFFICE VISIT (OUTPATIENT)
Dept: FAMILY MEDICINE CLINIC | Age: 78
End: 2023-07-18
Payer: MEDICARE

## 2023-07-18 VITALS
RESPIRATION RATE: 18 BRPM | TEMPERATURE: 97.8 F | SYSTOLIC BLOOD PRESSURE: 130 MMHG | BODY MASS INDEX: 25.32 KG/M2 | HEART RATE: 65 BPM | HEIGHT: 62 IN | WEIGHT: 137.6 LBS | DIASTOLIC BLOOD PRESSURE: 84 MMHG | OXYGEN SATURATION: 97 %

## 2023-07-18 DIAGNOSIS — R73.03 PREDIABETES: ICD-10-CM

## 2023-07-18 DIAGNOSIS — F51.04 PSYCHOPHYSIOLOGICAL INSOMNIA: Primary | ICD-10-CM

## 2023-07-18 DIAGNOSIS — F32.A ANXIETY AND DEPRESSION: ICD-10-CM

## 2023-07-18 DIAGNOSIS — F41.9 ANXIETY AND DEPRESSION: ICD-10-CM

## 2023-07-18 PROCEDURE — 1123F ACP DISCUSS/DSCN MKR DOCD: CPT | Performed by: FAMILY MEDICINE

## 2023-07-18 PROCEDURE — 99214 OFFICE O/P EST MOD 30 MIN: CPT | Performed by: FAMILY MEDICINE

## 2023-07-18 RX ORDER — ERGOCALCIFEROL 1.25 MG/1
CAPSULE ORAL
COMMUNITY
Start: 2023-06-22

## 2023-07-18 ASSESSMENT — ENCOUNTER SYMPTOMS
SHORTNESS OF BREATH: 0
DIARRHEA: 0
NAUSEA: 0
VOMITING: 0
CONSTIPATION: 0
WHEEZING: 0
COUGH: 0
ABDOMINAL PAIN: 0

## 2023-07-18 NOTE — PROGRESS NOTES
[address] Outpatient        SUBJECTIVE:  CC: had concerns including Other (Pt here for a 4 month check. Has been feeling well, not feeling as fatigued as she was approximately 1 month ago at the last appt.) and Discuss Labs (Wants results of the labs recently had done. ). HPI:  Sandra Pleitez is a female 70 y.o. presented to the clinic for a follow up appointment. She was started on doxylamine for fatigue and insomnia. As well her Paxil was decreased from 20 to 10 mg daily. She states she feels great. Denies any recurrent tremors. Review of Systems   Constitutional:  Negative for appetite change, fatigue and fever. Respiratory:  Negative for cough, shortness of breath and wheezing. Cardiovascular:  Negative for chest pain and palpitations. Gastrointestinal:  Negative for abdominal pain, constipation, diarrhea, nausea and vomiting. Neurological:  Negative for tremors, numbness and headaches. Psychiatric/Behavioral:  Negative for sleep disturbance and suicidal ideas. The patient is nervous/anxious.          Depression     Outpatient Medications Marked as Taking for the 7/18/23 encounter (Office Visit) with Veronica García MD   Medication Sig Dispense Refill    vitamin D (ERGOCALCIFEROL) 1.25 MG (53566 UT) CAPS capsule       lamoTRIgine (LAMICTAL) 100 MG tablet TAKE 1 TABLET BY MOUTH IN THE  MORNING AND 1 TABLET BY MOUTH  BEFORE BEDTIME 180 tablet 1    levETIRAcetam (KEPPRA) 500 MG tablet TAKE 1 TABLET BY MOUTH TWICE  DAILY 180 tablet 1    atorvastatin (LIPITOR) 40 MG tablet TAKE 1 TABLET BY MOUTH AT NIGHT 90 tablet 1    PARoxetine (PAXIL) 10 MG tablet Take 1 tablet by mouth daily 90 tablet 0    doxyLAMINE succinate (GNP SLEEP AID) 25 MG tablet Take 1 tablet by mouth nightly as needed for Sleep 30 tablet 0    zoledronic acid (RECLAST) 5 MG/100ML SOLN Infuse 100 mLs intravenously once for 1 dose 188 mL 0    folic acid (FOLVITE) 1 MG tablet Take 1 tablet by mouth daily 90 tablet 1

## 2023-07-27 ENCOUNTER — TELEPHONE (OUTPATIENT)
Dept: ENT CLINIC | Age: 78
End: 2023-07-27

## 2023-07-27 NOTE — TELEPHONE ENCOUNTER
Pt was a no show for appt. Called pt to reschedule and left a voicemail. Final attempt to contact pt.  Electronically signed by Kierra Solano on 7/27/2023 at 1:19 PM

## 2023-07-31 DIAGNOSIS — F41.8 DEPRESSION WITH ANXIETY: ICD-10-CM

## 2023-07-31 RX ORDER — PAROXETINE HYDROCHLORIDE 20 MG/1
20 TABLET, FILM COATED ORAL DAILY
Qty: 90 TABLET | Refills: 3 | OUTPATIENT
Start: 2023-07-31

## 2023-08-17 NOTE — TELEPHONE ENCOUNTER
Last Appointment:  7/18/2023  Future Appointments   Date Time Provider [address]   12/19/2023 10:30 AM Yuliya Chacko MD [facility]   2/22/2024  1:00 PM JUAN DAVID [facility] CHAIR [address] [facility] [facility] [place]    Electronically signed by Marii Waggoner LPN on 6/26/94 at 2:62 AM EDT

## 2023-08-18 RX ORDER — ERGOCALCIFEROL 1.25 MG/1
CAPSULE ORAL
Qty: 13 CAPSULE | Refills: 3 | OUTPATIENT
Start: 2023-08-18

## 2023-10-31 DIAGNOSIS — E78.2 MIXED HYPERLIPIDEMIA: ICD-10-CM

## 2023-10-31 DIAGNOSIS — G40.909 SEIZURE DISORDER (HCC): ICD-10-CM

## 2023-10-31 DIAGNOSIS — Z86.03 HISTORY OF RESECTION OF MENINGIOMA: ICD-10-CM

## 2023-10-31 DIAGNOSIS — Z98.890 HISTORY OF RESECTION OF MENINGIOMA: ICD-10-CM

## 2023-11-01 ENCOUNTER — TELEPHONE (OUTPATIENT)
Dept: FAMILY MEDICINE CLINIC | Age: 78
End: 2023-11-01

## 2023-11-01 RX ORDER — LEVETIRACETAM 500 MG/1
TABLET ORAL
Qty: 180 TABLET | Refills: 0 | Status: SHIPPED | OUTPATIENT
Start: 2023-11-01

## 2023-11-01 RX ORDER — ATORVASTATIN CALCIUM 40 MG/1
TABLET, FILM COATED ORAL
Qty: 90 TABLET | Refills: 0 | Status: SHIPPED | OUTPATIENT
Start: 2023-11-01

## 2023-11-01 RX ORDER — LAMOTRIGINE 100 MG/1
TABLET ORAL
Qty: 180 TABLET | Refills: 0 | Status: SHIPPED | OUTPATIENT
Start: 2023-11-01

## 2023-11-01 NOTE — TELEPHONE ENCOUNTER
Last Appointment:  7/18/2023  Future Appointments   Date Time Provider [address]   12/19/2023 10:30 AM Aldair Frye MD [facility]   2/22/2024  1:00 PM SEY [facility] CHAIR 1 [facility] [place]

## 2023-11-01 NOTE — TELEPHONE ENCOUNTER
Pt called and is requesting a handicap placard. Please advise.      Electronically signed by Janna Yeung, [address] [facility] on 11/1/23 at 1:47 PM EDT

## 2023-11-14 NOTE — TELEPHONE ENCOUNTER
Pt came into the office for her handicap placard again, please sign so I can give to pt.      Electronically signed by Otilio Maurer, [address] [facility] on 11/14/23 at 1:37 PM EST

## 2023-12-19 ENCOUNTER — OFFICE VISIT (OUTPATIENT)
Dept: FAMILY MEDICINE CLINIC | Age: 78
End: 2023-12-19
Payer: MEDICARE

## 2023-12-19 VITALS
DIASTOLIC BLOOD PRESSURE: 72 MMHG | HEIGHT: 62 IN | HEART RATE: 58 BPM | OXYGEN SATURATION: 91 % | SYSTOLIC BLOOD PRESSURE: 138 MMHG | WEIGHT: 138 LBS | RESPIRATION RATE: 16 BRPM | BODY MASS INDEX: 25.4 KG/M2 | TEMPERATURE: 97 F

## 2023-12-19 DIAGNOSIS — Z23 FLU VACCINE NEED: ICD-10-CM

## 2023-12-19 DIAGNOSIS — I63.50 RIGHT PONTINE STROKE (HCC): ICD-10-CM

## 2023-12-19 DIAGNOSIS — Z59.41 FOOD INSECURITY: ICD-10-CM

## 2023-12-19 DIAGNOSIS — R29.898 LEFT LEG WEAKNESS: ICD-10-CM

## 2023-12-19 DIAGNOSIS — F41.8 DEPRESSION WITH ANXIETY: ICD-10-CM

## 2023-12-19 DIAGNOSIS — Z00.00 MEDICARE ANNUAL WELLNESS VISIT, SUBSEQUENT: Primary | ICD-10-CM

## 2023-12-19 DIAGNOSIS — R73.03 PREDIABETES: ICD-10-CM

## 2023-12-19 DIAGNOSIS — M81.6 LOCALIZED OSTEOPOROSIS, UNSPECIFIED PATHOLOGICAL FRACTURE PRESENCE: ICD-10-CM

## 2023-12-19 DIAGNOSIS — E55.9 VITAMIN D DEFICIENCY: ICD-10-CM

## 2023-12-19 PROCEDURE — 90471 IMMUNIZATION ADMIN: CPT | Performed by: FAMILY MEDICINE

## 2023-12-19 PROCEDURE — G0439 PPPS, SUBSEQ VISIT: HCPCS | Performed by: FAMILY MEDICINE

## 2023-12-19 PROCEDURE — 1123F ACP DISCUSS/DSCN MKR DOCD: CPT | Performed by: FAMILY MEDICINE

## 2023-12-19 PROCEDURE — 90694 VACC AIIV4 NO PRSRV 0.5ML IM: CPT | Performed by: FAMILY MEDICINE

## 2023-12-19 PROCEDURE — G0008 ADMIN INFLUENZA VIRUS VAC: HCPCS | Performed by: FAMILY MEDICINE

## 2023-12-19 PROCEDURE — 99214 OFFICE O/P EST MOD 30 MIN: CPT | Performed by: FAMILY MEDICINE

## 2023-12-19 SDOH — ECONOMIC STABILITY - FOOD INSECURITY: FOOD INSECURITY: Z59.41

## 2023-12-19 NOTE — PATIENT INSTRUCTIONS
Incorporated. Care instructions adapted under license by [facility] ([facility]). If you have questions about a medical condition or this instruction, always ask your healthcare professional. [address] any warranty or liability for your use of this information. Personalized Preventive Plan for Sandra Pleitez - 12/19/2023  Medicare offers a range of preventive health benefits. Some of the tests and screenings are paid in full while other may be subject to a deductible, co-insurance, and/or copay. Some of these benefits include a comprehensive review of your medical history including lifestyle, illnesses that may run in your family, and various assessments and screenings as appropriate. After reviewing your medical record and screening and assessments performed today your provider may have ordered immunizations, labs, imaging, and/or referrals for you. A list of these orders (if applicable) as well as your Preventive Care list are included within your After Visit Summary for your review. Other Preventive Recommendations:    A preventive eye exam performed by an eye specialist is recommended every 1-2 years to screen for glaucoma; cataracts, macular degeneration, and other eye disorders. A preventive dental visit is recommended every 6 months. Try to get at least 150 minutes of exercise per week or 10,000 steps per day on a pedometer . Order or download the FREE \"Exercise & Physical Activity: Your Everyday Guide\" from [employer]. Call 2-237.295.8858 or search [employer] online. You need 5376-0621 mg of calcium and 8921-6825 IU of vitamin D per day. It is possible to meet your calcium requirement with diet alone, but a vitamin D supplement is usually necessary to meet this goal.  When exposed to the sun, use a sunscreen that protects against both UVA and UVB radiation with an SPF of 30 or greater.  Reapply every 2 to 3 hours or after sweating,

## 2023-12-19 NOTE — PROGRESS NOTES
Medicare Annual Wellness Visit    Ok Marry is here for Medicare AWV (Patient would like a safety button system and some food assistance)    Assessment & Plan   Medicare annual wellness visit, subsequent  Care gaps and survey reviewed. Right pontine stroke ([address])  -     Lipid Panel; Future  -     [facility]  Vitamin D deficiency  -     Vitamin D 25 Hydroxy; Future  Depression with anxiety  -     CBC with Auto Differential; Future  -     Comprehensive Metabolic Panel; Future  -     Uric Acid; Future  Prediabetes  -     Hemoglobin A1C; Future  Localized osteoporosis, unspecified pathological fracture presence  -     CBC with Auto Differential; Future  BMI 25.0-25.9,adult  -     CBC with Auto Differential; Future  -     Lipid Panel; Future  Food insecurity  -     [facility] Referral to Social Work (Primary Care Only)  Flu vaccine need  -     Influenza, FLUAD, (age 72 y+), IM, Preservative Free, 0.5 mL  Left leg weakness  -     [facility]  Recommendations for Preventive Services Due: see orders and patient instructions/AVS.  Recommended screening schedule for the next 5-10 years is provided to the patient in written form: see Patient Instructions/AVS.     Return in 3 months (on 3/19/2024) for Medicare Annual Wellness Visit in 1 year. Subjective   Repeat dexa pending. Patient on Reclast therapy. Uses a walker in her apartment. Has a handicap placard. Her aches bother her more in the cold. Last fall was 2 months ago. Patient's complete Health Risk Assessment and screening values have been reviewed and are found in Flowsheets. The following problems were reviewed today and where indicated follow up appointments were made and/or referrals ordered.     Positive Risk Factor Screenings with Interventions:    Fall Risk:  Do you feel unsteady or are you worried about falling? : (!) yes  2 or more falls in past year?: (!) yes  Fall with injury in past year?: no

## 2024-01-09 ENCOUNTER — CARE COORDINATION (OUTPATIENT)
Dept: CARE COORDINATION | Age: 79
End: 2024-01-09

## 2024-01-09 DIAGNOSIS — Z98.890 HISTORY OF RESECTION OF MENINGIOMA: ICD-10-CM

## 2024-01-09 DIAGNOSIS — E78.2 MIXED HYPERLIPIDEMIA: ICD-10-CM

## 2024-01-09 DIAGNOSIS — Z86.03 HISTORY OF RESECTION OF MENINGIOMA: ICD-10-CM

## 2024-01-09 DIAGNOSIS — G40.909 SEIZURE DISORDER (HCC): ICD-10-CM

## 2024-01-09 NOTE — TELEPHONE ENCOUNTER
Last Appointment:  12/19/2023  Future Appointments   Date Time Provider Department Center   2[facility]2024 10[facility]   2/22/2024  1:00 PM [facility] CHAIR 1 YZ INF SER [facility]   3/19/2024  9:00 AM Sylwia Almaraz MD [facility]

## 2024-01-09 NOTE — CARE COORDINATION
[facility] called and spoke with Cristiane today for follow up.   Reviewed first call and issues addressed.   Cristiane states her family is taking care of the HERBIE and food stamp application for her.        [facility] inquired if Cristiane had found out more regarding her eligibility for her ex-husbands SS income.  She states she has not called or looked into it further as of yet.   [facility] offered to assist with making a merged call but Cristiane declined and states she has the number to call and can do so on her own.     Cristiane states that with her family helping her complete the applications she does not feel SWCC is needed.   Cristiane was agreeable to this being the final call and will let her PCP know if she has any other [facility] needs in the future.    CC agreeable to final call and signing off today.

## 2024-01-10 RX ORDER — ATORVASTATIN CALCIUM 40 MG/1
TABLET, FILM COATED ORAL
Qty: 90 TABLET | Refills: 1 | Status: SHIPPED | OUTPATIENT
Start: 2024-01-10

## 2024-01-10 RX ORDER — LEVETIRACETAM 500 MG/1
TABLET ORAL
Qty: 180 TABLET | Refills: 1 | Status: SHIPPED | OUTPATIENT
Start: 2024-01-10

## 2024-01-10 RX ORDER — LAMOTRIGINE 100 MG/1
TABLET ORAL
Qty: 180 TABLET | Refills: 1 | Status: SHIPPED | OUTPATIENT
Start: 2024-01-10

## 2024-02-13 ENCOUNTER — OFFICE VISIT (OUTPATIENT)
Dept: FAMILY MEDICINE CLINIC | Age: 79
End: 2024-02-13
Payer: MEDICARE

## 2024-02-13 ENCOUNTER — HOSPITAL ENCOUNTER (OUTPATIENT)
Age: 79
Discharge: HOME | End: 2024-02-13
Payer: MEDICARE

## 2024-02-13 VITALS
RESPIRATION RATE: 18 BRPM | DIASTOLIC BLOOD PRESSURE: 86 MMHG | HEART RATE: 77 BPM | OXYGEN SATURATION: 97 % | WEIGHT: 140.6 LBS | HEIGHT: 62 IN | BODY MASS INDEX: 25.88 KG/M2 | TEMPERATURE: 97 F | SYSTOLIC BLOOD PRESSURE: 134 MMHG

## 2024-02-13 DIAGNOSIS — G40.109 PARTIAL SEIZURE DISORDER (HCC): ICD-10-CM

## 2024-02-13 DIAGNOSIS — M81.0 AGE-RELATED OSTEOPOROSIS WITHOUT CURRENT PATHOLOGICAL FRACTURE: Primary | ICD-10-CM

## 2024-02-13 DIAGNOSIS — M81.0 AGE-RELATED OSTEOPOROSIS WITHOUT CURRENT PATHOLOGICAL FRACTURE: ICD-10-CM

## 2024-02-13 DIAGNOSIS — I69.354 HEMIPARESIS AFFECTING LEFT SIDE AS LATE EFFECT OF STROKE (HCC): ICD-10-CM

## 2024-02-13 DIAGNOSIS — R53.83 FATIGUE, UNSPECIFIED TYPE: ICD-10-CM

## 2024-02-13 LAB
ANION GAP SERPL CALCULATED.3IONS-SCNC: 9 MMOL/L (ref 7–16)
BUN SERPL-MCNC: 19 MG/DL (ref 6–23)
CALCIUM SERPL-MCNC: 9.9 MG/DL (ref 8.6–10.2)
CHLORIDE SERPL-SCNC: 103 MMOL/L (ref 98–107)
CO2 SERPL-SCNC: 28 MMOL/L (ref 22–29)
CREAT SERPL-MCNC: 0.9 MG/DL (ref 0.5–1)
GFR SERPL CREATININE-BSD FRML MDRD: >60 ML/MIN/1.73M2
GLUCOSE SERPL-MCNC: 96 MG/DL (ref 74–99)
POTASSIUM SERPL-SCNC: 4.4 MMOL/L (ref 3.5–5)
SODIUM SERPL-SCNC: 140 MMOL/L (ref 132–146)

## 2024-02-13 PROCEDURE — 36415 COLL VENOUS BLD VENIPUNCTURE: CPT

## 2024-02-13 PROCEDURE — 80048 BASIC METABOLIC PNL TOTAL CA: CPT

## 2024-02-13 PROCEDURE — 99214 OFFICE O/P EST MOD 30 MIN: CPT | Performed by: FAMILY MEDICINE

## 2024-02-13 PROCEDURE — 1123F ACP DISCUSS/DSCN MKR DOCD: CPT | Performed by: FAMILY MEDICINE

## 2024-02-13 RX ORDER — ZOLEDRONIC ACID 5 MG/100ML
5 INJECTION, SOLUTION INTRAVENOUS ONCE
Status: CANCELLED | OUTPATIENT
Start: 2024-02-13 | End: 2024-02-13

## 2024-02-13 RX ORDER — ZOLEDRONIC ACID 5 MG/100ML
5 INJECTION, SOLUTION INTRAVENOUS ONCE
Qty: 100 ML | Refills: 0 | Status: SHIPPED | OUTPATIENT
Start: 2024-02-13 | End: 2024-02-13

## 2024-02-13 RX ORDER — SODIUM CHLORIDE 9 MG/ML
5-250 INJECTION, SOLUTION INTRAVENOUS PRN
Status: CANCELLED | OUTPATIENT
Start: 2024-02-13

## 2024-02-13 RX ORDER — SODIUM CHLORIDE 9 MG/ML
5-250 INJECTION, SOLUTION INTRAVENOUS PRN
OUTPATIENT
Start: 2024-02-13

## 2024-02-13 RX ORDER — SODIUM CHLORIDE 9 MG/ML
INJECTION, SOLUTION INTRAVENOUS CONTINUOUS
OUTPATIENT
Start: 2024-02-13

## 2024-02-13 RX ORDER — DIPHENHYDRAMINE HYDROCHLORIDE 50 MG/ML
50 INJECTION INTRAMUSCULAR; INTRAVENOUS
OUTPATIENT
Start: 2024-02-13

## 2024-02-13 RX ORDER — HEPARIN 100 UNIT/ML
500 SYRINGE INTRAVENOUS PRN
OUTPATIENT
Start: 2024-02-13

## 2024-02-13 RX ORDER — SODIUM CHLORIDE 0.9 % (FLUSH) 0.9 %
5-40 SYRINGE (ML) INJECTION PRN
Status: CANCELLED | OUTPATIENT
Start: 2024-02-13

## 2024-02-13 RX ORDER — EPINEPHRINE 1 MG/ML
0.3 INJECTION, SOLUTION, CONCENTRATE INTRAVENOUS PRN
OUTPATIENT
Start: 2024-02-13

## 2024-02-13 RX ORDER — ACETAMINOPHEN 325 MG/1
650 TABLET ORAL
OUTPATIENT
Start: 2024-02-13

## 2024-02-13 RX ORDER — ALBUTEROL SULFATE 90 UG/1
4 AEROSOL, METERED RESPIRATORY (INHALATION) PRN
OUTPATIENT
Start: 2024-02-13

## 2024-02-13 RX ORDER — ONDANSETRON 2 MG/ML
8 INJECTION INTRAMUSCULAR; INTRAVENOUS
OUTPATIENT
Start: 2024-02-13

## 2024-02-13 ASSESSMENT — PATIENT HEALTH QUESTIONNAIRE - PHQ9
4. FEELING TIRED OR HAVING LITTLE ENERGY: 3
SUM OF ALL RESPONSES TO PHQ QUESTIONS 1-9: 3
SUM OF ALL RESPONSES TO PHQ9 QUESTIONS 1 & 2: 0
7. TROUBLE CONCENTRATING ON THINGS, SUCH AS READING THE NEWSPAPER OR WATCHING TELEVISION: 0
8. MOVING OR SPEAKING SO SLOWLY THAT OTHER PEOPLE COULD HAVE NOTICED. OR THE OPPOSITE, BEING SO FIGETY OR RESTLESS THAT YOU HAVE BEEN MOVING AROUND A LOT MORE THAN USUAL: 0
SUM OF ALL RESPONSES TO PHQ QUESTIONS 1-9: 3
6. FEELING BAD ABOUT YOURSELF - OR THAT YOU ARE A FAILURE OR HAVE LET YOURSELF OR YOUR FAMILY DOWN: 0
1. LITTLE INTEREST OR PLEASURE IN DOING THINGS: 0
2. FEELING DOWN, DEPRESSED OR HOPELESS: 0
10. IF YOU CHECKED OFF ANY PROBLEMS, HOW DIFFICULT HAVE THESE PROBLEMS MADE IT FOR YOU TO DO YOUR WORK, TAKE CARE OF THINGS AT HOME, OR GET ALONG WITH OTHER PEOPLE: 0
3. TROUBLE FALLING OR STAYING ASLEEP: 0
5. POOR APPETITE OR OVEREATING: 0
9. THOUGHTS THAT YOU WOULD BE BETTER OFF DEAD, OR OF HURTING YOURSELF: 0
SUM OF ALL RESPONSES TO PHQ QUESTIONS 1-9: 3

## 2024-02-13 NOTE — PROGRESS NOTES
[facility] Medicine Outpatient    SUBJECTIVE:  CC: had concerns including Other (Pt here for orders for her Reclast Infusion. //Pt states she has been feeling well, just tired a lot.).  HPI:  Cristiane Larry is a female 78 y.o. presented to the clinic for an established visit. Reports doing well. Has some fatigue, but feels it is because of the weather.    Review of Systems   Constitutional:  Negative for appetite change and fever.   Respiratory:  Negative for cough, shortness of breath and wheezing.    Cardiovascular:  Negative for chest pain and palpitations.   Gastrointestinal:  Negative for abdominal pain, constipation, diarrhea, nausea and vomiting.       Outpatient Medications Marked as Taking for the 2/13/24 encounter (Office Visit) with Sylwia Almaraz MD   Medication Sig Dispense Refill    zoledronic acid (RECLAST) 5 MG/100ML SOLN Infuse 100 mLs intravenously once for 1 dose 100 mL 0    levETIRAcetam (KEPPRA) 500 MG tablet TAKE 1 TABLET BY MOUTH TWICE  DAILY 180 tablet 1    lamoTRIgine (LAMICTAL) 100 MG tablet TAKE 1 TABLET BY MOUTH IN THE  MORNING AND 1 TABLET BY MOUTH  BEFORE BEDTIME 180 tablet 1    atorvastatin (LIPITOR) 40 MG tablet TAKE 1 TABLET BY MOUTH AT NIGHT 90 tablet 1    vitamin D (ERGOCALCIFEROL) 1.25 MG (60577 UT) CAPS capsule       PARoxetine (PAXIL) 10 MG tablet Take 1 tablet by mouth daily 90 tablet 0    folic acid (FOLVITE) 1 MG tablet Take 1 tablet by mouth daily 90 tablet 1    aspirin 81 MG chewable tablet Take 1 tablet by mouth daily 90 tablet 0       I have reviewed all pertinent PMHx, PSHx, FamHx, SocialHx, medications, and allergies and updated history as appropriate.    OBJECTIVE    VS: /86 (Site: Right Upper Arm, Position: Sitting)   Pulse 77   Temp 97 °F (36.1 °C) (Temporal)   Resp 18   Ht 1.575 m (5' 2.01\")   Wt 63.8 kg (140 lb 9.6 oz)   LMP  (LMP Unknown)   SpO2 97%   BMI 25.71 kg/m²   Physical Exam  Constitutional:       General: She is not in acute

## 2024-02-16 ENCOUNTER — TELEPHONE (OUTPATIENT)
Dept: FAMILY MEDICINE CLINIC | Age: 79
End: 2024-02-16

## 2024-02-16 NOTE — TELEPHONE ENCOUNTER
Patient called and requested we deactivate her MyChart as she states a friend has access to her MyChart and she wanted it deactivated.

## 2024-02-19 ASSESSMENT — ENCOUNTER SYMPTOMS
DIARRHEA: 0
NAUSEA: 0
CONSTIPATION: 0
SHORTNESS OF BREATH: 0
VOMITING: 0
WHEEZING: 0
ABDOMINAL PAIN: 0
COUGH: 0

## 2024-02-20 ENCOUNTER — EVALUATION (OUTPATIENT)
Dept: PHYSICAL THERAPY | Age: 79
End: 2024-02-20
Payer: MEDICARE

## 2024-02-20 DIAGNOSIS — R29.898 LEFT LEG WEAKNESS: ICD-10-CM

## 2024-02-20 DIAGNOSIS — I63.50 RIGHT PONTINE STROKE (HCC): Primary | ICD-10-CM

## 2024-02-20 PROCEDURE — 97161 PT EVAL LOW COMPLEX 20 MIN: CPT | Performed by: PHYSICAL THERAPIST

## 2024-02-20 NOTE — PROGRESS NOTES
[facility]          Phone: [phone] Fax: [phone]    Physical Therapy Daily Treatment Note  Date:  2024    Patient Name:  Cristiane Larry    :  1945  MRN: [id number]    Evaluating Therapist:  Ebony Gonzales, PT [id number]    Restrictions/Precautions:    Diagnosis:     Diagnosis Orders   1. Right pontine stroke (HCC)        2. Left leg weakness          Treatment Diagnosis:    Insurance/Certification information:  [facility] Medicare  Referring Physician:  Sylwia Almaraz MD  Plan of care signed (Y/N):    Visit# / total visits:  -10  Pain level: N/A   Time In:  1305  Time Out:  1335    Subjective:  See evaluation     Exercises:  Exercise/Equipment Resistance/Repetitions Other comments     Bike/stepper       SAQ       SLR       Sidelying hip abd       Seated knee flex       Sit to stand       Step-ups       Standing hip 3-way       Standing eyes closed (narrow MIKHAIL, tandem, foam)         Gait with head movements                                                                       Other Therapeutic Activities:  PT evaluation completed    Home Exercise Program:  N/A    Manual Treatments:  N/A    Modalities:  N/A     Time-in Time-out Total Time   68781  Evaluation Low Complexity 1305 1335 30   63617  Evaluation Med Complexity      22211  Evaluation High Complexity      34031  Ther Ex      54420  Neuro Re-ed        21104  Ther Activities        94260  Manual Therapy       72834  E-stim       90297  Ultrasound            Session 1305 1335 30       Treatment/Activity Tolerance:  [x] Patient tolerated treatment well [] Patient limited by fatigue  [] Patient limited by pain  [] Patient limited by other medical complications  [] Other:     Prognosis: [x] Good [] Fair  [] Poor    Patient Requires Follow-up: [x] Yes  [] No    Plan:   [] Continue per plan of care [] Alter current plan (see comments)  [x] Plan of care initiated [] Hold pending MD visit [] Discharge  Plan for 
#6 (CTSIB eyes closed):  S    Functional Mobility/Tests:  DGI: 16/24    ASSESSMENT     Outcome Measure:   LEFS: 45/80    Problems:   Strength decreased  Balance limitations   Decreased functional ability with use of right lower extremity, walking, stairs    Reason for Skilled Care: Pt presents to therapy with left leg weakness, decreased, balance, and difficulty with gait, placing pt at risk for falls.    [x] There are no barriers affecting plan of care or recovery    [] Barriers to this patient's plan of care or recovery include.    Domestic Concerns:  [x] No  [] Yes:    Long Term goals ( 4-5 weeks )  Increase Strength to 4 to 4+/5 left hip, 5/5 left knee   Demonstrate improved balance: pt will be able to maintain tandem Romberg with eyes closed without fall  Able to perform/complete the following functions/tasks: Increase DGI by 3-4 points  Increase LEFS to 50/80  Independent with Home Exercise Programs     Rehab Potential: [x] Good  [] Fair  [] Poor    PLAN       Treatment Plan:   [x] Therapeutic Exercise  [x] Therapeutic Activity  [x] Neuromuscular Re-education   [x] Gait Training  [x] Balance Training  [] Aerobic conditioning  [] Manual Therapy  [] Massage/Fascial release   [] Work/Sport specific activities    [] Pain Neuroscience [] Cold/hotpack  [] Vasocompression  [] Electrical Stimulation  [] Lumbar/Cervical Traction  [] Ultrasound   [] Iontophoresis: 4 mg/mL Dexamethasone Sodium Phosphate 40-80 mAmin        [x] Instruction in HEP      []  Medication allergies reviewed for use of Dexamethasone Sodium Phosphate 4mg/ml  with iontophoresis treatments.  Patient is not allergic.      The following CPT codes are likely to be used in the care of this patient: 62282 PT Evaluation: Low Complexity, 59512 PT Re-Evaluation, 37755 Therapeutic Exercise, 99291 Neuromuscular Re-Education, 98757 Therapeutic Activities, and 23809 Manual Therapy      Suggested Professional Referral: [x] No  [] Yes:     Patient Education:  [x]

## 2024-02-22 ENCOUNTER — HOSPITAL ENCOUNTER (OUTPATIENT)
Dept: INFUSION THERAPY | Age: 79
Setting detail: INFUSION SERIES
Discharge: HOME | End: 2024-02-22
Payer: MEDICARE

## 2024-02-22 VITALS
RESPIRATION RATE: 16 BRPM | OXYGEN SATURATION: 96 % | HEART RATE: 92 BPM | DIASTOLIC BLOOD PRESSURE: 102 MMHG | TEMPERATURE: 97.1 F | SYSTOLIC BLOOD PRESSURE: 143 MMHG

## 2024-02-22 DIAGNOSIS — M81.0 AGE-RELATED OSTEOPOROSIS WITHOUT CURRENT PATHOLOGICAL FRACTURE: Primary | ICD-10-CM

## 2024-02-22 PROCEDURE — 96374 THER/PROPH/DIAG INJ IV PUSH: CPT

## 2024-02-22 PROCEDURE — 2580000003 HC RX 258: Performed by: FAMILY MEDICINE

## 2024-02-22 PROCEDURE — 6360000002 HC RX W HCPCS: Performed by: FAMILY MEDICINE

## 2024-02-22 RX ORDER — SODIUM CHLORIDE 9 MG/ML
5-250 INJECTION, SOLUTION INTRAVENOUS PRN
Status: CANCELLED | OUTPATIENT
Start: 2024-02-22

## 2024-02-22 RX ORDER — SODIUM CHLORIDE 9 MG/ML
5-250 INJECTION, SOLUTION INTRAVENOUS PRN
Status: DISCONTINUED | OUTPATIENT
Start: 2024-02-22 | End: 2024-02-23 | Stop reason: HOSPADM

## 2024-02-22 RX ORDER — ONDANSETRON 2 MG/ML
8 INJECTION INTRAMUSCULAR; INTRAVENOUS
OUTPATIENT
Start: 2024-02-22

## 2024-02-22 RX ORDER — SODIUM CHLORIDE 0.9 % (FLUSH) 0.9 %
5-40 SYRINGE (ML) INJECTION PRN
Status: DISCONTINUED | OUTPATIENT
Start: 2024-02-22 | End: 2024-02-23 | Stop reason: HOSPADM

## 2024-02-22 RX ORDER — HEPARIN 100 UNIT/ML
500 SYRINGE INTRAVENOUS PRN
OUTPATIENT
Start: 2024-02-22

## 2024-02-22 RX ORDER — ZOLEDRONIC ACID 5 MG/100ML
5 INJECTION, SOLUTION INTRAVENOUS ONCE
Status: CANCELLED | OUTPATIENT
Start: 2024-02-22 | End: 2024-02-22

## 2024-02-22 RX ORDER — ALBUTEROL SULFATE 90 UG/1
4 AEROSOL, METERED RESPIRATORY (INHALATION) PRN
OUTPATIENT
Start: 2024-02-22

## 2024-02-22 RX ORDER — EPINEPHRINE 1 MG/ML
0.3 INJECTION, SOLUTION, CONCENTRATE INTRAVENOUS PRN
OUTPATIENT
Start: 2024-02-22

## 2024-02-22 RX ORDER — ACETAMINOPHEN 325 MG/1
650 TABLET ORAL
OUTPATIENT
Start: 2024-02-22

## 2024-02-22 RX ORDER — SODIUM CHLORIDE 9 MG/ML
INJECTION, SOLUTION INTRAVENOUS CONTINUOUS
OUTPATIENT
Start: 2024-02-22

## 2024-02-22 RX ORDER — SODIUM CHLORIDE 0.9 % (FLUSH) 0.9 %
5-40 SYRINGE (ML) INJECTION PRN
OUTPATIENT
Start: 2024-02-22

## 2024-02-22 RX ORDER — ZOLEDRONIC ACID 5 MG/100ML
5 INJECTION, SOLUTION INTRAVENOUS ONCE
Status: COMPLETED | OUTPATIENT
Start: 2024-02-22 | End: 2024-02-22

## 2024-02-22 RX ORDER — SODIUM CHLORIDE 9 MG/ML
5-250 INJECTION, SOLUTION INTRAVENOUS PRN
OUTPATIENT
Start: 2024-02-22

## 2024-02-22 RX ORDER — DIPHENHYDRAMINE HYDROCHLORIDE 50 MG/ML
50 INJECTION INTRAMUSCULAR; INTRAVENOUS
OUTPATIENT
Start: 2024-02-22

## 2024-02-22 RX ADMIN — SODIUM CHLORIDE, PRESERVATIVE FREE 10 ML: 5 INJECTION INTRAVENOUS at 14:28

## 2024-02-22 RX ADMIN — SODIUM CHLORIDE, PRESERVATIVE FREE 10 ML: 5 INJECTION INTRAVENOUS at 14:00

## 2024-02-22 RX ADMIN — ZOLEDRONIC ACID 5 MG: 5 INJECTION, SOLUTION INTRAVENOUS at 14:03

## 2024-02-22 NOTE — PROGRESS NOTES
Patient tolerated reclast infusion well. Remained on unit for 20 minutes after treatment. Patient alert and oriented x3. No distress noted. Vital signs stable. Although her blood pressure was elevated and was written down for her to call her doctor and inform her. She is able to check her blood pressures at home and was instructed to do so. Patient denies any new or worsening pain. Educated patient on possible side effects and treatment of medication.     Patient verbalized understanding. Provided patient education and/or discharge material. Patient denies any needs. All questions answered. D/C in stable condition.

## 2024-02-26 ENCOUNTER — TREATMENT (OUTPATIENT)
Dept: PHYSICAL THERAPY | Age: 79
End: 2024-02-26
Payer: MEDICARE

## 2024-02-26 DIAGNOSIS — I63.50 RIGHT PONTINE STROKE (HCC): Primary | ICD-10-CM

## 2024-02-26 DIAGNOSIS — R29.898 LEFT LEG WEAKNESS: ICD-10-CM

## 2024-02-26 PROCEDURE — 97110 THERAPEUTIC EXERCISES: CPT

## 2024-02-26 NOTE — PROGRESS NOTES
[facility]          Phone: [phone] Fax: [phone]    Physical Therapy Daily Treatment Note  Date:  2024    Patient Name:  Cristiane Larry    :  1945  MRN: [id number]    Evaluating Therapist:  Ebony Gonzales, PT [id number]    Restrictions/Precautions:    Diagnosis:     Diagnosis Orders   1. Right pontine stroke (HCC)        2. Left leg weakness          Treatment Diagnosis:    Insurance/Certification information:  [facility] Medicare  Referring Physician:  Sylwia Almaraz MD  Plan of care signed (Y/N):    Visit# / total visits:  2/8-10  Pain level: N/A   Time In:  915  Time Out:  956    Subjective:  pt arrived without AD, gait is slow w/ a WBOS    Exercises:  Exercise/Equipment Resistance/Repetitions Other comments     Bike/X 10 mins       SAQ 2 sec x 10 reps B       SLR 2 sec x 10 reps B   HEP     Sidelying hip abd 2 sec x 10 reps B   HEP     Seated knee flex RTB x 10 reps B       Sit to stand X 10 reps from elevated mat      Step-ups X 10 reps B fwd      Standing hip 3-way X 10 reps B   HEP     Standing eyes closed (narrow MIKHAIL, tandem, foam)         Gait with head movements X 2 laps each vert / horiz CGA                                                                     Other Therapeutic Activities:  pt tolerated introduction of TE and balance ex.  Noted mild unsteadiness with gait and head mvt in both directions CGA of therapist required.      Home Exercise Program:   HEP provided and reviewed with pt.  Pt reported understanding    Manual Treatments:  N/A    Modalities:  N/A     Time-in Time-out Total Time   21103  Evaluation Low Complexity      34224  Evaluation Med Complexity      91561  Evaluation High Complexity      86109  Ther Ex 915 156 10   96293  Neuro Re-ed        14516  Ther Activities        83561  Manual Therapy       88293  E-stim       02786  Ultrasound            Session 645 716 41       Treatment/Activity Tolerance:  [x] Patient

## 2024-02-27 ENCOUNTER — OFFICE VISIT (OUTPATIENT)
Dept: FAMILY MEDICINE CLINIC | Age: 79
End: 2024-02-27
Payer: MEDICARE

## 2024-02-27 VITALS
HEIGHT: 62 IN | RESPIRATION RATE: 18 BRPM | BODY MASS INDEX: 25.8 KG/M2 | DIASTOLIC BLOOD PRESSURE: 88 MMHG | HEART RATE: 80 BPM | OXYGEN SATURATION: 96 % | SYSTOLIC BLOOD PRESSURE: 134 MMHG | WEIGHT: 140.2 LBS | TEMPERATURE: 96.9 F

## 2024-02-27 DIAGNOSIS — I10 ESSENTIAL HYPERTENSION: Primary | ICD-10-CM

## 2024-02-27 DIAGNOSIS — M81.0 OSTEOPOROSIS WITHOUT CURRENT PATHOLOGICAL FRACTURE, UNSPECIFIED OSTEOPOROSIS TYPE: ICD-10-CM

## 2024-02-27 PROCEDURE — 3079F DIAST BP 80-89 MM HG: CPT | Performed by: FAMILY MEDICINE

## 2024-02-27 PROCEDURE — 1123F ACP DISCUSS/DSCN MKR DOCD: CPT | Performed by: FAMILY MEDICINE

## 2024-02-27 PROCEDURE — 99214 OFFICE O/P EST MOD 30 MIN: CPT | Performed by: FAMILY MEDICINE

## 2024-02-27 PROCEDURE — 3075F SYST BP GE 130 - 139MM HG: CPT | Performed by: FAMILY MEDICINE

## 2024-02-27 RX ORDER — AMLODIPINE BESYLATE 5 MG/1
5 TABLET ORAL DAILY
Qty: 30 TABLET | Refills: 1 | Status: SHIPPED | OUTPATIENT
Start: 2024-02-27

## 2024-02-27 NOTE — PROGRESS NOTES
have reviewed all pertinent PMHx, PSHx, FamHx, SocialHx, medications, and allergies and updated history as appropriate.    OBJECTIVE    VS: /88 (Site: Left Upper Arm, Position: Sitting)   Pulse 80   Temp 96.9 °F (36.1 °C) (Temporal)   Resp 18   Ht 1.575 m (5' 2.01\")   Wt 63.6 kg (140 lb 3.2 oz)   LMP  (LMP Unknown)   SpO2 96%   BMI 25.64 kg/m²   Physical Exam  Constitutional:       General: She is not in acute distress.     Appearance: She is well-developed. She is not diaphoretic.   HENT:      Head: Normocephalic and atraumatic.   Eyes:      Conjunctiva/sclera: Conjunctivae normal.      Pupils: Pupils are equal, round, and reactive to light.   Cardiovascular:      Rate and Rhythm: Normal rate and regular rhythm.   Pulmonary:      Effort: Pulmonary effort is normal.      Breath sounds: Normal breath sounds.   Abdominal:      General: Bowel sounds are normal. There is no distension.      Palpations: Abdomen is soft.      Tenderness: There is no abdominal tenderness.      Hernia: No hernia is present.   Musculoskeletal:      Cervical back: Normal range of motion and neck supple.   Skin:     General: Skin is warm and dry.   Neurological:      Mental Status: She is alert and oriented to person, place, and time.         ASSESSMENT/PLAN:  1. Essential hypertension  Record reviewed. Add Amlodipine rx. Bp log. EKG 2023. Advise on low sodium diet.   - amLODIPine (NORVASC) 5 MG tablet; Take 1 tablet by mouth daily  Dispense: 30 tablet; Refill: 1    2. Osteoporosis  Reclast infusion 2/22/24. Patient will call to schedule updated Dexa scan.     I have reviewed my findings and recommendations with Cristiane Almaraz MD  3/6/2024 3:53 PM  Return in about 6 weeks (around 4/9/2024).     Counseled regarding above diagnosis, including possible risks and complications, especially if left uncontrolled.    Patient counseled on red flag symptoms and if they occur to go to the ED.     Discussed medications

## 2024-02-28 ENCOUNTER — TREATMENT (OUTPATIENT)
Dept: PHYSICAL THERAPY | Age: 79
End: 2024-02-28

## 2024-03-06 ASSESSMENT — ENCOUNTER SYMPTOMS
SHORTNESS OF BREATH: 0
DIARRHEA: 0
COUGH: 0
ABDOMINAL PAIN: 0
CONSTIPATION: 0
WHEEZING: 0
NAUSEA: 0
VOMITING: 0

## 2024-03-21 NOTE — TELEPHONE ENCOUNTER
Patient called stating that her Amlodipine can't be filled because the pharmacy says its \"federally controlled\". I called [facility] and verified that the rx was there and OK to fill.

## 2024-04-09 ENCOUNTER — OFFICE VISIT (OUTPATIENT)
Dept: FAMILY MEDICINE CLINIC | Age: 79
End: 2024-04-09
Payer: MEDICARE

## 2024-04-09 VITALS
BODY MASS INDEX: 25.62 KG/M2 | OXYGEN SATURATION: 92 % | HEART RATE: 63 BPM | WEIGHT: 139.2 LBS | HEIGHT: 62 IN | SYSTOLIC BLOOD PRESSURE: 120 MMHG | DIASTOLIC BLOOD PRESSURE: 82 MMHG | TEMPERATURE: 97.2 F | RESPIRATION RATE: 18 BRPM

## 2024-04-09 DIAGNOSIS — I10 ESSENTIAL HYPERTENSION: Primary | ICD-10-CM

## 2024-04-09 DIAGNOSIS — R73.03 PREDIABETES: ICD-10-CM

## 2024-04-09 DIAGNOSIS — F41.8 DEPRESSION WITH ANXIETY: ICD-10-CM

## 2024-04-09 DIAGNOSIS — E55.9 VITAMIN D DEFICIENCY: ICD-10-CM

## 2024-04-09 PROCEDURE — 1123F ACP DISCUSS/DSCN MKR DOCD: CPT | Performed by: FAMILY MEDICINE

## 2024-04-09 PROCEDURE — G2211 COMPLEX E/M VISIT ADD ON: HCPCS | Performed by: FAMILY MEDICINE

## 2024-04-09 PROCEDURE — 99214 OFFICE O/P EST MOD 30 MIN: CPT | Performed by: FAMILY MEDICINE

## 2024-04-09 PROCEDURE — 3074F SYST BP LT 130 MM HG: CPT | Performed by: FAMILY MEDICINE

## 2024-04-09 PROCEDURE — 3079F DIAST BP 80-89 MM HG: CPT | Performed by: FAMILY MEDICINE

## 2024-04-09 RX ORDER — PAROXETINE 10 MG/1
10 TABLET, FILM COATED ORAL DAILY
Qty: 90 TABLET | Refills: 1 | Status: SHIPPED | OUTPATIENT
Start: 2024-04-09

## 2024-04-09 RX ORDER — PAROXETINE 10 MG/1
10 TABLET, FILM COATED ORAL DAILY
Qty: 90 TABLET | Refills: 1 | Status: SHIPPED
Start: 2024-04-09 | End: 2024-04-09

## 2024-04-09 SDOH — ECONOMIC STABILITY: FOOD INSECURITY: WITHIN THE PAST 12 MONTHS, YOU WORRIED THAT YOUR FOOD WOULD RUN OUT BEFORE YOU GOT MONEY TO BUY MORE.: NEVER TRUE

## 2024-04-09 SDOH — ECONOMIC STABILITY: INCOME INSECURITY: HOW HARD IS IT FOR YOU TO PAY FOR THE VERY BASICS LIKE FOOD, HOUSING, MEDICAL CARE, AND HEATING?: NOT HARD AT ALL

## 2024-04-09 SDOH — ECONOMIC STABILITY: FOOD INSECURITY: WITHIN THE PAST 12 MONTHS, THE FOOD YOU BOUGHT JUST DIDN'T LAST AND YOU DIDN'T HAVE MONEY TO GET MORE.: NEVER TRUE

## 2024-04-09 ASSESSMENT — ENCOUNTER SYMPTOMS
VOMITING: 0
WHEEZING: 0
COUGH: 0
ABDOMINAL PAIN: 0
NAUSEA: 0
SHORTNESS OF BREATH: 0
DIARRHEA: 0
CONSTIPATION: 0

## 2024-04-09 NOTE — PROGRESS NOTES
[facility] Medicine Outpatient        SUBJECTIVE:  CC: had concerns including Hypertension (Pt here for a 6 week HTN check. BP at home 110-125/75-83.) and Medication Refill (Pt needs med refill.).  HPI:  Cristiane Larry is a female 78 y.o. presented to the clinic for an established visit. Reports bp has been good on Amlodipine. Denies any acute concerns.     Review of Systems   Constitutional:  Negative for appetite change, fatigue and fever.   Respiratory:  Negative for cough, shortness of breath and wheezing.    Cardiovascular:  Negative for chest pain and palpitations.   Gastrointestinal:  Negative for abdominal pain, constipation, diarrhea, nausea and vomiting.   Genitourinary:  Negative for difficulty urinating and dysuria.   Psychiatric/Behavioral:  Negative for suicidal ideas. The patient is nervous/anxious.         Depression     Outpatient Medications Marked as Taking for the 4/9/24 encounter (Office Visit) with Sylwia Almaraz MD   Medication Sig Dispense Refill    PARoxetine (PAXIL) 10 MG tablet Take 1 tablet by mouth daily 90 tablet 1    amLODIPine (NORVASC) 5 MG tablet Take 1 tablet by mouth daily 30 tablet 1    zoledronic acid (RECLAST) 5 MG/100ML SOLN Infuse 100 mLs intravenously once for 1 dose 100 mL 0    levETIRAcetam (KEPPRA) 500 MG tablet TAKE 1 TABLET BY MOUTH TWICE  DAILY 180 tablet 1    lamoTRIgine (LAMICTAL) 100 MG tablet TAKE 1 TABLET BY MOUTH IN THE  MORNING AND 1 TABLET BY MOUTH  BEFORE BEDTIME 180 tablet 1    atorvastatin (LIPITOR) 40 MG tablet TAKE 1 TABLET BY MOUTH AT NIGHT 90 tablet 1    vitamin D (ERGOCALCIFEROL) 1.25 MG (99846 UT) CAPS capsule Take 1 capsule by mouth once a week      folic acid (FOLVITE) 1 MG tablet Take 1 tablet by mouth daily 90 tablet 1    aspirin 81 MG chewable tablet Take 1 tablet by mouth daily 90 tablet 0       I have reviewed all pertinent PMHx, PSHx, FamHx, SocialHx, medications, and allergies and updated history as

## 2024-04-15 DIAGNOSIS — I10 ESSENTIAL HYPERTENSION: ICD-10-CM

## 2024-04-15 NOTE — TELEPHONE ENCOUNTER
Last seen 4/9/2024  Next appt 10/8/2024    Electronically signed by JALIL SOLANO MA on 4/15/24 at 1:21 PM EDT

## 2024-04-16 RX ORDER — AMLODIPINE BESYLATE 5 MG/1
5 TABLET ORAL DAILY
Qty: 30 TABLET | Refills: 1 | Status: SHIPPED | OUTPATIENT
Start: 2024-04-16

## 2024-07-05 NOTE — PROGRESS NOTES
Date:  7/5/2024          Dear Dr. Almaraz:       This is to inform you that, as per [facility] Physical Therapy department policy, your patient Cristiane Larry is as of today’s date being discharged from Physical Therapy secondary to the following reasons:    If a Physical Therapy outpatient misses three scheduled appointments without any prior notification, he or she will be discharged from physical therapy services.  A new prescription will be necessary to resume physical therapy.    If a client is absent for 50% of scheduled visits during a one-month period, he or she will be discharged from physical therapy services.  A new prescription will be necessary to resume physical therapy.      If you have any questions, please feel free to call at [phone]      Thank you          Ebony Gonzales, PT, [id number]    [phone]    The information contained in this Fax the designated recipients intend message only for the personal and confidential use named above.  This information is to be handled as privileged and confidential.  If the reader of this message is not the intended recipient, you are hereby notified that you have received this document in error and that any review, dissemination, distribution or copying of this message is strictly prohibited.  If you receive this communication in error, please notify us immediately at the above number and return the original to us by mail.  Thank you      [facility]  [facility] [address], [place] [address]

## 2024-08-05 DIAGNOSIS — G40.909 SEIZURE DISORDER (HCC): ICD-10-CM

## 2024-08-05 DIAGNOSIS — F41.8 DEPRESSION WITH ANXIETY: ICD-10-CM

## 2024-08-06 RX ORDER — LEVETIRACETAM 500 MG/1
TABLET ORAL
Qty: 180 TABLET | Refills: 0 | Status: SHIPPED | OUTPATIENT
Start: 2024-08-06

## 2024-08-06 RX ORDER — PAROXETINE 10 MG/1
10 TABLET, FILM COATED ORAL DAILY
Qty: 90 TABLET | Refills: 0 | Status: SHIPPED | OUTPATIENT
Start: 2024-08-06

## 2024-08-06 NOTE — TELEPHONE ENCOUNTER
Last seen 4/9/2024  Next appt 10/8/2024    Requested Prescriptions     Pending Prescriptions Disp Refills    PARoxetine (PAXIL) 10 MG tablet [Pharmacy Med Name: PARoxetine HCl 10 MG Oral Tablet] 90 tablet 0     Sig: TAKE 1 TABLET BY MOUTH DAILY    levETIRAcetam (KEPPRA) 500 MG tablet [Pharmacy Med Name: levETIRAcetam 500 MG Oral Tablet] 180 tablet 0     Sig: TAKE 1 TABLET BY MOUTH TWICE  DAILY        Electronically signed by JALIL SOLANO MA on 8/6/24 at 7:54 AM EDT

## 2024-10-07 ENCOUNTER — HOSPITAL ENCOUNTER (OUTPATIENT)
Age: 79
Discharge: HOME | End: 2024-10-07
Payer: MEDICARE

## 2024-10-07 DIAGNOSIS — G40.909 SEIZURE DISORDER (HCC): ICD-10-CM

## 2024-10-07 DIAGNOSIS — R73.03 PREDIABETES: ICD-10-CM

## 2024-10-07 DIAGNOSIS — E55.9 VITAMIN D DEFICIENCY: ICD-10-CM

## 2024-10-07 DIAGNOSIS — F41.8 DEPRESSION WITH ANXIETY: ICD-10-CM

## 2024-10-07 LAB
25(OH)D3 SERPL-MCNC: 28.2 NG/ML (ref 30–100)
ALBUMIN SERPL-MCNC: 4.3 G/DL (ref 3.5–5.2)
ALP SERPL-CCNC: 156 U/L (ref 35–104)
ALT SERPL-CCNC: 12 U/L (ref 0–32)
ANION GAP SERPL CALCULATED.3IONS-SCNC: 13 MMOL/L (ref 7–16)
AST SERPL-CCNC: 19 U/L (ref 0–31)
BILIRUB SERPL-MCNC: 0.4 MG/DL (ref 0–1.2)
BUN SERPL-MCNC: 16 MG/DL (ref 6–23)
CALCIUM SERPL-MCNC: 9.9 MG/DL (ref 8.6–10.2)
CHLORIDE SERPL-SCNC: 104 MMOL/L (ref 98–107)
CO2 SERPL-SCNC: 24 MMOL/L (ref 22–29)
CREAT SERPL-MCNC: 0.9 MG/DL (ref 0.5–1)
ERYTHROCYTE [DISTWIDTH] IN BLOOD BY AUTOMATED COUNT: 14.7 % (ref 11.5–15)
GFR, ESTIMATED: 66 ML/MIN/1.73M2
GLUCOSE SERPL-MCNC: 93 MG/DL (ref 74–99)
HBA1C MFR BLD: 5.5 % (ref 4–5.6)
HCT VFR BLD AUTO: 40.7 % (ref 34–48)
HGB BLD-MCNC: 13.2 G/DL (ref 11.5–15.5)
MCH RBC QN AUTO: 29.3 PG (ref 26–35)
MCHC RBC AUTO-ENTMCNC: 32.4 G/DL (ref 32–34.5)
MCV RBC AUTO: 90.2 FL (ref 80–99.9)
PLATELET # BLD AUTO: 295 K/UL (ref 130–450)
PMV BLD AUTO: 9 FL (ref 7–12)
POTASSIUM SERPL-SCNC: 4.2 MMOL/L (ref 3.5–5)
PROT SERPL-MCNC: 7.3 G/DL (ref 6.4–8.3)
RBC # BLD AUTO: 4.51 M/UL (ref 3.5–5.5)
SODIUM SERPL-SCNC: 141 MMOL/L (ref 132–146)
WBC OTHER # BLD: 4.4 K/UL (ref 4.5–11.5)

## 2024-10-07 PROCEDURE — 80053 COMPREHEN METABOLIC PANEL: CPT

## 2024-10-07 PROCEDURE — 36415 COLL VENOUS BLD VENIPUNCTURE: CPT

## 2024-10-07 PROCEDURE — 83036 HEMOGLOBIN GLYCOSYLATED A1C: CPT

## 2024-10-07 PROCEDURE — 85027 COMPLETE CBC AUTOMATED: CPT

## 2024-10-07 PROCEDURE — 82306 VITAMIN D 25 HYDROXY: CPT

## 2024-10-08 ENCOUNTER — OFFICE VISIT (OUTPATIENT)
Dept: FAMILY MEDICINE CLINIC | Age: 79
End: 2024-10-08

## 2024-10-08 VITALS
TEMPERATURE: 97 F | RESPIRATION RATE: 18 BRPM | BODY MASS INDEX: 25.36 KG/M2 | HEART RATE: 72 BPM | OXYGEN SATURATION: 97 % | SYSTOLIC BLOOD PRESSURE: 122 MMHG | HEIGHT: 62 IN | DIASTOLIC BLOOD PRESSURE: 82 MMHG | WEIGHT: 137.8 LBS

## 2024-10-08 DIAGNOSIS — Z00.00 MEDICARE ANNUAL WELLNESS VISIT, SUBSEQUENT: Primary | ICD-10-CM

## 2024-10-08 DIAGNOSIS — R53.83 FATIGUE, UNSPECIFIED TYPE: ICD-10-CM

## 2024-10-08 DIAGNOSIS — E55.9 VITAMIN D DEFICIENCY: ICD-10-CM

## 2024-10-08 DIAGNOSIS — I63.50 RIGHT PONTINE STROKE (HCC): ICD-10-CM

## 2024-10-08 DIAGNOSIS — I10 ESSENTIAL HYPERTENSION: ICD-10-CM

## 2024-10-08 DIAGNOSIS — M81.8 OTHER OSTEOPOROSIS WITHOUT CURRENT PATHOLOGICAL FRACTURE: ICD-10-CM

## 2024-10-08 DIAGNOSIS — F39 MOOD DISORDER (HCC): ICD-10-CM

## 2024-10-08 RX ORDER — ERGOCALCIFEROL 1.25 MG/1
50000 CAPSULE, LIQUID FILLED ORAL WEEKLY
Qty: 12 CAPSULE | Refills: 0 | Status: SHIPPED | OUTPATIENT
Start: 2024-10-08

## 2024-10-08 SDOH — ECONOMIC STABILITY: FOOD INSECURITY: WITHIN THE PAST 12 MONTHS, THE FOOD YOU BOUGHT JUST DIDN'T LAST AND YOU DIDN'T HAVE MONEY TO GET MORE.: NEVER TRUE

## 2024-10-08 SDOH — ECONOMIC STABILITY: FOOD INSECURITY: WITHIN THE PAST 12 MONTHS, YOU WORRIED THAT YOUR FOOD WOULD RUN OUT BEFORE YOU GOT MONEY TO BUY MORE.: NEVER TRUE

## 2024-10-08 SDOH — ECONOMIC STABILITY: INCOME INSECURITY: HOW HARD IS IT FOR YOU TO PAY FOR THE VERY BASICS LIKE FOOD, HOUSING, MEDICAL CARE, AND HEATING?: NOT HARD AT ALL

## 2024-10-08 ASSESSMENT — PATIENT HEALTH QUESTIONNAIRE - PHQ9
SUM OF ALL RESPONSES TO PHQ QUESTIONS 1-9: 2
8. MOVING OR SPEAKING SO SLOWLY THAT OTHER PEOPLE COULD HAVE NOTICED. OR THE OPPOSITE, BEING SO FIGETY OR RESTLESS THAT YOU HAVE BEEN MOVING AROUND A LOT MORE THAN USUAL: NOT AT ALL
5. POOR APPETITE OR OVEREATING: NOT AT ALL
1. LITTLE INTEREST OR PLEASURE IN DOING THINGS: NOT AT ALL
6. FEELING BAD ABOUT YOURSELF - OR THAT YOU ARE A FAILURE OR HAVE LET YOURSELF OR YOUR FAMILY DOWN: NOT AT ALL
4. FEELING TIRED OR HAVING LITTLE ENERGY: SEVERAL DAYS
2. FEELING DOWN, DEPRESSED OR HOPELESS: NOT AT ALL
3. TROUBLE FALLING OR STAYING ASLEEP: SEVERAL DAYS
7. TROUBLE CONCENTRATING ON THINGS, SUCH AS READING THE NEWSPAPER OR WATCHING TELEVISION: NOT AT ALL
9. THOUGHTS THAT YOU WOULD BE BETTER OFF DEAD, OR OF HURTING YOURSELF: NOT AT ALL
SUM OF ALL RESPONSES TO PHQ9 QUESTIONS 1 & 2: 0
SUM OF ALL RESPONSES TO PHQ QUESTIONS 1-9: 2
10. IF YOU CHECKED OFF ANY PROBLEMS, HOW DIFFICULT HAVE THESE PROBLEMS MADE IT FOR YOU TO DO YOUR WORK, TAKE CARE OF THINGS AT HOME, OR GET ALONG WITH OTHER PEOPLE: NOT DIFFICULT AT ALL

## 2024-10-08 NOTE — TELEPHONE ENCOUNTER
Last seen 4/9/2024  Next appt 10/8/2024    Requested Prescriptions     Pending Prescriptions Disp Refills    levETIRAcetam (KEPPRA) 500 MG tablet [Pharmacy Med Name: levETIRAcetam 500 MG Oral Tablet] 180 tablet 3     Sig: TAKE 1 TABLET BY MOUTH TWICE  DAILY    PARoxetine (PAXIL) 10 MG tablet [Pharmacy Med Name: PARoxetine HCl 10 MG Oral Tablet] 90 tablet 3     Sig: TAKE 1 TABLET BY MOUTH DAILY    Electronically signed by JALIL SOLANO MA on 10/8/24 at 8:18 AM EDT

## 2024-10-08 NOTE — PROGRESS NOTES
work done yesterday.  Has an appointment for another Reclast in February. No falls/traumas. Her kids got her a safety button incase she falls. She enjoys working in her garden.    Patient's complete Health Risk Assessment and screening values have been reviewed and are found in Flowsheets. The following problems were reviewed today and where indicated follow up appointments were made and/or referrals ordered.    Positive Risk Factor Screenings with Interventions:                Inactivity:  On average, how many days per week do you engage in moderate to strenuous exercise (like a brisk walk)?: 2 days (!) Abnormal  On average, how many minutes do you engage in exercise at this level?: 20 min  Interventions:  Exercise 150 min/week as tolerated.     Poor Eating Habits/Diet:  Do you eat balanced/healthy meals regularly?: (!) No  Interventions:  Encourage well balanced diet.           Advanced Directives:  Do you have a Living Will?: (!) No    Intervention:  Declines ACP referral.           Objective   Vitals:    10/08/24 0810   BP: 122/82   Site: Right Upper Arm   Position: Sitting   Pulse: 72   Resp: 18   Temp: 97 °F (36.1 °C)   TempSrc: Temporal   SpO2: 97%   Weight: 62.5 kg (137 lb 12.8 oz)   Height: 1.575 m (5' 2.01\")      Body mass index is 25.2 kg/m².        Review of Systems   Constitutional:  Negative for appetite change, fatigue and fever.   Respiratory:  Negative for cough, shortness of breath and wheezing.    Cardiovascular:  Negative for chest pain and palpitations.   Gastrointestinal:  Negative for abdominal pain, constipation, diarrhea, nausea and vomiting.   Genitourinary:  Negative for difficulty urinating and dysuria.   Psychiatric/Behavioral:  Negative for suicidal ideas. The patient is nervous/anxious.         Depression     Physical Exam  Constitutional:       General: She is not in acute distress.     Appearance: She is well-developed. She is not diaphoretic.   HENT:      Head: Normocephalic and

## 2024-10-09 RX ORDER — PAROXETINE 10 MG/1
10 TABLET, FILM COATED ORAL DAILY
Qty: 90 TABLET | Refills: 1 | Status: SHIPPED | OUTPATIENT
Start: 2024-10-09

## 2024-10-09 RX ORDER — LEVETIRACETAM 500 MG/1
TABLET ORAL
Qty: 180 TABLET | Refills: 1 | Status: SHIPPED | OUTPATIENT
Start: 2024-10-09

## 2024-10-16 DIAGNOSIS — Z98.890 HISTORY OF RESECTION OF MENINGIOMA: ICD-10-CM

## 2024-10-16 DIAGNOSIS — Z86.03 HISTORY OF RESECTION OF MENINGIOMA: ICD-10-CM

## 2024-10-17 DIAGNOSIS — E78.2 MIXED HYPERLIPIDEMIA: ICD-10-CM

## 2024-10-17 NOTE — TELEPHONE ENCOUNTER
Name of Medication(s) Requested:  Requested Prescriptions     Pending Prescriptions Disp Refills    lamoTRIgine (LAMICTAL) 100 MG tablet [Pharmacy Med Name: lamoTRIgine 100 MG Oral Tablet] 180 tablet 3     Sig: TAKE 1 TABLET BY MOUTH IN THE  MORNING AND 1 TABLET BY MOUTH  BEFORE BEDTIME       Medication is on current medication list Yes    Dosage and directions were verified? Yes    Quantity verified: 90 day supply     Pharmacy Verified?  Yes    Last Appointment:  10/8/2024    Future appts:  Future Appointments   Date Time Provider Department Center   1/9/2025  7:45 AM Sylwia Almaraz MD [facility]   2/21/2025  9:00 AM [facility] CHAIR 1 [facility]        (If no appt send self scheduling link. .REFILLAPPT)  Scheduling request sent?     [] Yes  [x] No    Does patient need updated?  [] Yes  [x] No

## 2024-10-17 NOTE — TELEPHONE ENCOUNTER
Name of Medication(s) Requested:  Requested Prescriptions     Pending Prescriptions Disp Refills    atorvastatin (LIPITOR) 40 MG tablet [Pharmacy Med Name: Atorvastatin Calcium 40 MG Oral Tablet] 90 tablet 0     Sig: TAKE 1 TABLET BY MOUTH AT NIGHT       Medication is on current medication list Yes    Dosage and directions were verified? Yes    Quantity verified: 90 day supply     Pharmacy Verified?  Yes    Last Appointment:  10/8/2024    Future appts:  Future Appointments   Date Time Provider Department Center   1/9/2025  7:45 AM Sylwia Almaraz MD [facility] E[facility] DEP   2/21/2025  9:00 AM [facility] CHAIR 1 [facility]        (If no appt send self scheduling link. .REFILLAPPT)  Scheduling request sent?     [] Yes  [x] No    Does patient need updated?  [] Yes  [x] No

## 2024-10-18 RX ORDER — LAMOTRIGINE 100 MG/1
TABLET ORAL
Qty: 180 TABLET | Refills: 0 | Status: SHIPPED | OUTPATIENT
Start: 2024-10-18

## 2024-10-18 RX ORDER — ATORVASTATIN CALCIUM 40 MG/1
TABLET, FILM COATED ORAL
Qty: 90 TABLET | Refills: 0 | Status: SHIPPED | OUTPATIENT
Start: 2024-10-18

## 2024-11-07 PROBLEM — Z00.00 MEDICARE ANNUAL WELLNESS VISIT, SUBSEQUENT: Status: RESOLVED | Noted: 2024-10-08 | Resolved: 2024-11-07

## 2024-11-27 DIAGNOSIS — E55.9 VITAMIN D DEFICIENCY: ICD-10-CM

## 2024-11-27 NOTE — TELEPHONE ENCOUNTER
Last Appointment:  10/8/2024  Future Appointments   Date Time Provider Department Center   1/9/2025  7:45 AM Sylwia Almaraz MD [facility] DEP   2/21/2025  9:00 AM [facility] CHAIR 1 [facility]

## 2024-12-02 RX ORDER — ERGOCALCIFEROL 1.25 MG/1
50000 CAPSULE, LIQUID FILLED ORAL WEEKLY
Qty: 12 CAPSULE | Refills: 0 | Status: SHIPPED | OUTPATIENT
Start: 2024-12-02

## 2024-12-18 DIAGNOSIS — E78.2 MIXED HYPERLIPIDEMIA: ICD-10-CM

## 2024-12-18 DIAGNOSIS — Z98.890 HISTORY OF RESECTION OF MENINGIOMA: ICD-10-CM

## 2024-12-18 DIAGNOSIS — Z86.03 HISTORY OF RESECTION OF MENINGIOMA: ICD-10-CM

## 2024-12-19 RX ORDER — LAMOTRIGINE 100 MG/1
TABLET ORAL
Qty: 180 TABLET | Refills: 1 | Status: SHIPPED | OUTPATIENT
Start: 2024-12-19

## 2024-12-19 RX ORDER — ATORVASTATIN CALCIUM 40 MG/1
TABLET, FILM COATED ORAL
Qty: 90 TABLET | Refills: 1 | Status: SHIPPED | OUTPATIENT
Start: 2024-12-19

## 2025-01-06 ENCOUNTER — HOSPITAL ENCOUNTER (OUTPATIENT)
Age: 80
Discharge: HOME | End: 2025-01-06
Payer: MEDICARE

## 2025-01-06 DIAGNOSIS — R53.83 FATIGUE, UNSPECIFIED TYPE: ICD-10-CM

## 2025-01-06 LAB — TSH SERPL DL<=0.05 MIU/L-ACNC: 2.28 UIU/ML (ref 0.27–4.2)

## 2025-01-06 PROCEDURE — 84439 ASSAY OF FREE THYROXINE: CPT

## 2025-01-06 PROCEDURE — 86618 LYME DISEASE ANTIBODY: CPT

## 2025-01-06 PROCEDURE — 36415 COLL VENOUS BLD VENIPUNCTURE: CPT

## 2025-01-06 PROCEDURE — 86592 SYPHILIS TEST NON-TREP QUAL: CPT

## 2025-01-06 PROCEDURE — 86664 EPSTEIN-BARR NUCLEAR ANTIGEN: CPT

## 2025-01-06 PROCEDURE — 82306 VITAMIN D 25 HYDROXY: CPT

## 2025-01-06 PROCEDURE — 86663 EPSTEIN-BARR ANTIBODY: CPT

## 2025-01-06 PROCEDURE — 87389 HIV-1 AG W/HIV-1&-2 AB AG IA: CPT

## 2025-01-06 PROCEDURE — 84443 ASSAY THYROID STIM HORMONE: CPT

## 2025-01-06 PROCEDURE — 86803 HEPATITIS C AB TEST: CPT

## 2025-01-06 PROCEDURE — 86665 EPSTEIN-BARR CAPSID VCA: CPT

## 2025-01-07 LAB
25(OH)D3 SERPL-MCNC: 70.2 NG/ML (ref 30–100)
HCV AB SERPL QL IA: NONREACTIVE
HIV 1+2 AB+HIV1 P24 AG SERPL QL IA: NONREACTIVE
RPR SER QL: NONREACTIVE
T4 FREE SERPL-MCNC: 1 NG/DL (ref 0.9–1.7)

## 2025-01-09 LAB
B BURGDOR AB SER IA-ACNC: 0.32 IV
EBV EA-D IGG SER-ACNC: 35 U/ML
EBV INTERPRETATION: ABNORMAL
EBV NA IGG SER IA-ACNC: 41 U/ML
EBV VCA IGG SER-ACNC: 848 U/ML
EBV VCA IGM SER-ACNC: 38 U/ML

## 2025-01-22 DIAGNOSIS — M81.8 OTHER OSTEOPOROSIS WITHOUT CURRENT PATHOLOGICAL FRACTURE: Primary | ICD-10-CM

## 2025-01-23 ENCOUNTER — TELEPHONE (OUTPATIENT)
Dept: PRIMARY CARE CLINIC | Age: 80
End: 2025-01-23

## 2025-01-23 NOTE — TELEPHONE ENCOUNTER
Patient called office back, informed of above message    Number to schedule dexa provided. And agreeable to obtain labs prior to infusion

## 2025-01-24 DIAGNOSIS — E55.9 VITAMIN D DEFICIENCY: ICD-10-CM

## 2025-01-27 NOTE — TELEPHONE ENCOUNTER
Last seen 10/8/2024  Next appt 2/4/2025    Requested Prescriptions     Pending Prescriptions Disp Refills    vitamin D (ERGOCALCIFEROL) 1.25 MG (62961 UT) CAPS capsule [Pharmacy Med Name: Vitamin D (Ergocalciferol) 1.25 MG (58082 UT) Oral Capsule] 12 capsule 3     Sig: TAKE 1 CAPSULE BY MOUTH ONCE  WEEKLY    Electronically signed by JALIL SOLANO MA on 1/27/25 at 8:27 AM EST

## 2025-01-28 RX ORDER — ERGOCALCIFEROL 1.25 MG/1
50000 CAPSULE, LIQUID FILLED ORAL WEEKLY
Qty: 12 CAPSULE | Refills: 3 | Status: SHIPPED | OUTPATIENT
Start: 2025-01-28

## 2025-01-28 NOTE — TELEPHONE ENCOUNTER
Medication canceled and pt notified and amendable.     Electronically signed by JALIL SOLANO MA on 1/28/25 at 8:17 AM EST

## 2025-01-28 NOTE — TELEPHONE ENCOUNTER
Patient's last vitamin d was 70. Does not need high dose anymore. Please cancel at pharmacy and tell her to take 3,000u qd otc vitamin d.

## 2025-02-04 ENCOUNTER — OFFICE VISIT (OUTPATIENT)
Dept: FAMILY MEDICINE CLINIC | Age: 80
End: 2025-02-04

## 2025-02-04 VITALS
DIASTOLIC BLOOD PRESSURE: 82 MMHG | HEIGHT: 62 IN | BODY MASS INDEX: 25.33 KG/M2 | TEMPERATURE: 97.6 F | WEIGHT: 137.63 LBS | SYSTOLIC BLOOD PRESSURE: 130 MMHG | OXYGEN SATURATION: 97 % | RESPIRATION RATE: 18 BRPM | HEART RATE: 70 BPM

## 2025-02-04 DIAGNOSIS — I69.354 HEMIPARESIS AFFECTING LEFT SIDE AS LATE EFFECT OF STROKE (HCC): ICD-10-CM

## 2025-02-04 DIAGNOSIS — F39 MOOD DISORDER (HCC): ICD-10-CM

## 2025-02-04 DIAGNOSIS — Z86.19 HISTORY OF EPSTEIN-BARR VIRUS INFECTION: ICD-10-CM

## 2025-02-04 DIAGNOSIS — G40.109 PARTIAL SEIZURE DISORDER (HCC): ICD-10-CM

## 2025-02-04 DIAGNOSIS — Z00.00 MEDICARE ANNUAL WELLNESS VISIT, SUBSEQUENT: Primary | ICD-10-CM

## 2025-02-04 DIAGNOSIS — Z86.73 HISTORY OF STROKE: ICD-10-CM

## 2025-02-04 DIAGNOSIS — M81.0 AGE-RELATED OSTEOPOROSIS WITHOUT CURRENT PATHOLOGICAL FRACTURE: ICD-10-CM

## 2025-02-04 RX ORDER — PAROXETINE 10 MG/1
10 TABLET, FILM COATED ORAL DAILY
Qty: 90 TABLET | Refills: 1 | Status: SHIPPED | OUTPATIENT
Start: 2025-02-04

## 2025-02-04 SDOH — ECONOMIC STABILITY: FOOD INSECURITY: WITHIN THE PAST 12 MONTHS, THE FOOD YOU BOUGHT JUST DIDN'T LAST AND YOU DIDN'T HAVE MONEY TO GET MORE.: NEVER TRUE

## 2025-02-04 SDOH — ECONOMIC STABILITY: FOOD INSECURITY: WITHIN THE PAST 12 MONTHS, YOU WORRIED THAT YOUR FOOD WOULD RUN OUT BEFORE YOU GOT MONEY TO BUY MORE.: NEVER TRUE

## 2025-02-04 ASSESSMENT — PATIENT HEALTH QUESTIONNAIRE - PHQ9
SUM OF ALL RESPONSES TO PHQ QUESTIONS 1-9: 4
SUM OF ALL RESPONSES TO PHQ QUESTIONS 1-9: 4
1. LITTLE INTEREST OR PLEASURE IN DOING THINGS: SEVERAL DAYS
8. MOVING OR SPEAKING SO SLOWLY THAT OTHER PEOPLE COULD HAVE NOTICED. OR THE OPPOSITE, BEING SO FIGETY OR RESTLESS THAT YOU HAVE BEEN MOVING AROUND A LOT MORE THAN USUAL: NOT AT ALL
6. FEELING BAD ABOUT YOURSELF - OR THAT YOU ARE A FAILURE OR HAVE LET YOURSELF OR YOUR FAMILY DOWN: SEVERAL DAYS
10. IF YOU CHECKED OFF ANY PROBLEMS, HOW DIFFICULT HAVE THESE PROBLEMS MADE IT FOR YOU TO DO YOUR WORK, TAKE CARE OF THINGS AT HOME, OR GET ALONG WITH OTHER PEOPLE: NOT DIFFICULT AT ALL
7. TROUBLE CONCENTRATING ON THINGS, SUCH AS READING THE NEWSPAPER OR WATCHING TELEVISION: NOT AT ALL
5. POOR APPETITE OR OVEREATING: NOT AT ALL
SUM OF ALL RESPONSES TO PHQ QUESTIONS 1-9: 4
2. FEELING DOWN, DEPRESSED OR HOPELESS: SEVERAL DAYS
3. TROUBLE FALLING OR STAYING ASLEEP: NOT AT ALL
SUM OF ALL RESPONSES TO PHQ9 QUESTIONS 1 & 2: 2
4. FEELING TIRED OR HAVING LITTLE ENERGY: SEVERAL DAYS
9. THOUGHTS THAT YOU WOULD BE BETTER OFF DEAD, OR OF HURTING YOURSELF: NOT AT ALL
SUM OF ALL RESPONSES TO PHQ QUESTIONS 1-9: 4

## 2025-02-04 NOTE — PROGRESS NOTES
Medicare Annual Wellness Visit    Cristiane Larry is here for Medicare AWV (Pt here for her AWV.), Discuss Labs (Had labs 1-6-25.), Anxiety (Pt was on Paxil 10mg QD and Pt states at her last visit, the dose was cut to 1/2 tab QD and Pt states she is starting to get jumpy and irritable with people now and is requesting to be increased again.), and Burning Eyes (Pt states her eyes burn and feel dry at times. Last Eye Exam was 6 mos ago. Will get another exam later this month.)    Assessment & Plan  1. AWV  Survey and care gaps reviewed.     2. Mood Disorder  Depression with Anxiety. The patient has experienced increased irritability since her Paxil dosage was reduced. The dosage of Paxil will be increased back to 10 mg. She is advised to take a whole tablet and monitor for any issues. No s/h ideations. Encourage regular counseling.     3. Osteoporosis  The patient's last DEXA scan was in 2021. A new DEXA scan has been ordered to monitor her bone health. She is currently on Reclast, and a BMP has been ordered to assess kidney function before the next Reclast infusion.    4. Hemiparesis affecting left side  Chronic with hx of stroke. The patient reports chronic weakness in her left leg, which worsens in cold weather, causing her to drag her foot. There is no associated pain, numbness, or color change. An AFO brace will be ordered to help with foot drop. Physical therapy sessions will be arranged to strengthen her leg. She is advised to use a cane on extremely cold days to prevent falls with her brittle bones.    5. Partial seizure Disorder  Stable. Continue current regimen.     6. Hx of EBV      Follow-up  The patient will follow up in 6 months.  Medicare annual wellness visit, subsequent  Mood disorder (HCC)  Age-related osteoporosis without current pathological fracture  -     DEXA BONE DENSITY AXIAL SKELETON; Future  Hemiparesis affecting left side as late effect of stroke (HCC)  -     DME Order for (Specify) as OP  -

## 2025-02-11 ENCOUNTER — TELEPHONE (OUTPATIENT)
Dept: FAMILY MEDICINE CLINIC | Age: 80
End: 2025-02-11

## 2025-02-11 NOTE — TELEPHONE ENCOUNTER
Received a fax from [facility] stating pt needs a new order for her reclast.     Electronically signed by JALIL SOLANO MA on 2/11/25 at 3:45 PM EST

## 2025-02-12 PROBLEM — Z86.73 HISTORY OF STROKE: Status: ACTIVE | Noted: 2025-02-12

## 2025-02-12 RX ORDER — ZOLEDRONIC ACID 0.05 MG/ML
5 INJECTION, SOLUTION INTRAVENOUS ONCE
Qty: 100 ML | Refills: 0 | Status: SHIPPED | OUTPATIENT
Start: 2025-02-12 | End: 2025-02-12

## 2025-02-12 NOTE — TELEPHONE ENCOUNTER
Order sent to infusion center.     Electronically signed by JALIL SOLANO MA on 2/12/25 at 3:59 PM EST

## 2025-02-15 DIAGNOSIS — G40.909 SEIZURE DISORDER (HCC): ICD-10-CM

## 2025-02-17 RX ORDER — LEVETIRACETAM 500 MG/1
TABLET ORAL
Qty: 180 TABLET | Refills: 1 | Status: SHIPPED | OUTPATIENT
Start: 2025-02-17

## 2025-02-17 RX ORDER — PAROXETINE 10 MG/1
10 TABLET, FILM COATED ORAL DAILY
Qty: 90 TABLET | Refills: 1 | Status: SHIPPED | OUTPATIENT
Start: 2025-02-17

## 2025-02-17 NOTE — TELEPHONE ENCOUNTER
Last seen 2/4/2025  Next appt 8/5/2025    Requested Prescriptions     Pending Prescriptions Disp Refills    PARoxetine (PAXIL) 10 MG tablet [Pharmacy Med Name: PARoxetine HCl 10 MG Oral Tablet] 90 tablet 1     Sig: TAKE 1 TABLET BY MOUTH DAILY    levETIRAcetam (KEPPRA) 500 MG tablet [Pharmacy Med Name: levETIRAcetam 500 MG Oral Tablet] 180 tablet 1     Sig: TAKE 1 TABLET BY MOUTH TWICE  DAILY    Electronically signed by JALIL SOLANO MA on 2/17/25 at 8:22 AM EST

## 2025-02-20 ENCOUNTER — HOSPITAL ENCOUNTER (OUTPATIENT)
Age: 80
Discharge: HOME | End: 2025-02-20
Payer: MEDICARE

## 2025-02-20 DIAGNOSIS — M81.8 OTHER OSTEOPOROSIS WITHOUT CURRENT PATHOLOGICAL FRACTURE: ICD-10-CM

## 2025-02-20 LAB
ANION GAP SERPL CALCULATED.3IONS-SCNC: 9 MMOL/L (ref 7–16)
BUN SERPL-MCNC: 15 MG/DL (ref 6–23)
CALCIUM SERPL-MCNC: 10.1 MG/DL (ref 8.6–10.2)
CHLORIDE SERPL-SCNC: 101 MMOL/L (ref 98–107)
CO2 SERPL-SCNC: 28 MMOL/L (ref 22–29)
CREAT SERPL-MCNC: 0.9 MG/DL (ref 0.5–1)
GFR, ESTIMATED: 62 ML/MIN/1.73M2
GLUCOSE SERPL-MCNC: 90 MG/DL (ref 74–99)
POTASSIUM SERPL-SCNC: 4.7 MMOL/L (ref 3.5–5)
SODIUM SERPL-SCNC: 138 MMOL/L (ref 132–146)

## 2025-02-20 PROCEDURE — 80048 BASIC METABOLIC PNL TOTAL CA: CPT

## 2025-02-20 PROCEDURE — 36415 COLL VENOUS BLD VENIPUNCTURE: CPT

## 2025-02-21 ENCOUNTER — HOSPITAL ENCOUNTER (OUTPATIENT)
Dept: INFUSION THERAPY | Age: 80
Setting detail: INFUSION SERIES
Discharge: HOME | End: 2025-02-21
Payer: MEDICARE

## 2025-02-21 VITALS
SYSTOLIC BLOOD PRESSURE: 152 MMHG | DIASTOLIC BLOOD PRESSURE: 91 MMHG | OXYGEN SATURATION: 100 % | TEMPERATURE: 96.6 F | HEART RATE: 73 BPM | RESPIRATION RATE: 16 BRPM

## 2025-02-21 DIAGNOSIS — M81.0 AGE-RELATED OSTEOPOROSIS WITHOUT CURRENT PATHOLOGICAL FRACTURE: Primary | ICD-10-CM

## 2025-02-21 PROCEDURE — 96374 THER/PROPH/DIAG INJ IV PUSH: CPT

## 2025-02-21 PROCEDURE — 6360000002 HC RX W HCPCS: Performed by: FAMILY MEDICINE

## 2025-02-21 PROCEDURE — 2500000003 HC RX 250 WO HCPCS: Performed by: FAMILY MEDICINE

## 2025-02-21 PROCEDURE — 2580000003 HC RX 258: Performed by: FAMILY MEDICINE

## 2025-02-21 RX ORDER — SODIUM CHLORIDE 9 MG/ML
5-250 INJECTION, SOLUTION INTRAVENOUS PRN
OUTPATIENT
Start: 2025-02-21

## 2025-02-21 RX ORDER — ZOLEDRONIC ACID 0.05 MG/ML
5 INJECTION, SOLUTION INTRAVENOUS ONCE
Status: CANCELLED | OUTPATIENT
Start: 2025-02-21 | End: 2025-02-21

## 2025-02-21 RX ORDER — SODIUM CHLORIDE 9 MG/ML
5-250 INJECTION, SOLUTION INTRAVENOUS PRN
Status: DISCONTINUED | OUTPATIENT
Start: 2025-02-21 | End: 2025-02-22 | Stop reason: HOSPADM

## 2025-02-21 RX ORDER — SODIUM CHLORIDE 0.9 % (FLUSH) 0.9 %
5-40 SYRINGE (ML) INJECTION PRN
Status: DISCONTINUED | OUTPATIENT
Start: 2025-02-21 | End: 2025-02-22 | Stop reason: HOSPADM

## 2025-02-21 RX ORDER — ZOLEDRONIC ACID 0.05 MG/ML
5 INJECTION, SOLUTION INTRAVENOUS ONCE
Status: COMPLETED | OUTPATIENT
Start: 2025-02-21 | End: 2025-02-21

## 2025-02-21 RX ORDER — SODIUM CHLORIDE 0.9 % (FLUSH) 0.9 %
5-40 SYRINGE (ML) INJECTION PRN
OUTPATIENT
Start: 2025-02-21

## 2025-02-21 RX ADMIN — SODIUM CHLORIDE 100 ML/HR: 9 INJECTION, SOLUTION INTRAVENOUS at 13:42

## 2025-02-21 RX ADMIN — SODIUM CHLORIDE, PRESERVATIVE FREE 10 ML: 5 INJECTION INTRAVENOUS at 13:27

## 2025-02-21 RX ADMIN — ZOLEDRONIC ACID 5 MG: 5 INJECTION INTRAVENOUS at 13:27

## 2025-02-21 NOTE — PROGRESS NOTES
Patient tolerated IV reclast infusion well.  Patient alert and oriented x3. No distress noted. Vital signs stable. Patient denies any new or worsening pain. Patient denies any needs. All questions answered. D/C in stable condition.

## 2025-02-26 ENCOUNTER — EVALUATION (OUTPATIENT)
Dept: PHYSICAL THERAPY | Age: 80
End: 2025-02-26
Payer: MEDICARE

## 2025-02-26 DIAGNOSIS — I69.354 HEMIPARESIS AFFECTING LEFT SIDE AS LATE EFFECT OF STROKE (HCC): Primary | ICD-10-CM

## 2025-02-26 PROCEDURE — 97110 THERAPEUTIC EXERCISES: CPT | Performed by: PHYSICAL THERAPIST

## 2025-02-26 PROCEDURE — 97162 PT EVAL MOD COMPLEX 30 MIN: CPT | Performed by: PHYSICAL THERAPIST

## 2025-02-26 NOTE — PROGRESS NOTES
[facility]   Phone: [phone]   Fax: [phone]    Date:  2025   Patient: Cristiane Larry  : 1945  MRN: [id number]  Referring Provider: Sylwia Almaraz MD  [address] [place] [address]     Medical Diagnosis:      Diagnosis Orders   1. Hemiparesis affecting left side as late effect of stroke (HCC)             SUBJECTIVE:     History: Patient reports decreased functional mobility over the past several years having R LE weakness with cold temperatures that decreases with increased temperature due to hx of CVA 4 years ago.  Pt describes as weakness and difficulty to progress L LE. Pt has L LE instability with quick movements; falls towards L side when turning head to left particularly out in community setting    Previous PT: yes - did not help -- bike, balance/agility training, strength training on the mat    Related impairments:  balance, L LE weakness     Chief complaint: weakness, decreased balance, difficulty returning to standing from kneeling     Behavior: condition is staying the same    Pain: none  Current: denies/10         Symptom Type/Quality: N/A  Location:: N/A    Imaging results: No results found.    Past Medical History:  Past Medical History:   Diagnosis Date    Cerebral artery occlusion with cerebral infarction (HCC)     Depression     Essential hypertension 2024    Hemiparesis affecting left side as late effect of stroke (HCC) 2020    Hx of resection of meningioma 2021    Hyperlipidemia     Localization-related epilepsy (HCC) 2019    Partial seizure disorder (HCC) 2021     Past Surgical History:   Procedure Laterality Date    BRAIN MENINGIOMA EXCISION      BREAST SURGERY      HYSTERECTOMY (CERVIX STATUS UNKNOWN)      HYSTERECTOMY, VAGINAL      Total    SAVITA STEREO BREAST BX W LOC DEVICE 1ST LESION LEFT Left 2021    SAVITA STEROTACTIC LOC BREAST BIOPSY LEFT 2021 [facility]  none

## 2025-02-26 NOTE — PROGRESS NOTES
[facility]          Phone: [phone] Fax: [phone]    Physical Therapy Daily Treatment Note  Date:  2025    Patient Name:  Cristiane Larry    :  1945  MRN: [id number]    Evaluating therapist: Ev Plaza, PT, DPT  [id number]    Restrictions/Precautions:      Diagnosis:     Diagnosis Orders   1. Hemiparesis affecting left side as late effect of stroke (HCC)          Treatment Diagnosis:    Insurance/Certification information:  [facility] Medicare  [employer] Advantage  Referring Physician:  Sylwia Almaraz MD  Plan of care signed (Y/N):    Visit# / total visits:    Pain level: 0/10   Time In:  1110  Time Out:  1200    Subjective:  See initial evaluation    Exercises:  Exercise/Equipment Resistance/Repetitions Other comments            Standing heel raises 10x Light B UE support     Standing marches (reciprocal) 10x Light B UE support     Standing 3-way kicks 10x ea LE Light B UE support            Bike       Step ups (forward and lat)       Obstacle course                                                                                       Other:  Pt tolerated introduction of TE's to be completed at counter at home. Pt is eager to progress dynamic balance and  LLE strength/stability training. All HEP demonstrated with appropriate form    Home Exercise Program:  heel raises, mini squats, 3-way kicks, reciprocal marching (all at counter)    Manual Treatments:  --    Modalities:  --     Time-in Time-out Total Time   03535  Evaluation Low Complexity      89688  Evaluation Med Complexity 1110 1140 30   46489  Evaluation High Complexity      17624  Ther Ex 1140 1200 20   90709  Neuro Re-ed        01129  Ther Activities        68547  Manual Therapy       43100  E-stim       16508  Ultrasound            Session 1110 1200 50       Treatment/Activity Tolerance:  [x] Patient tolerated treatment well [] Patient limited by fatigue  [] Patient limited by pain  [] Patient limited by

## 2025-02-28 ENCOUNTER — TREATMENT (OUTPATIENT)
Dept: PHYSICAL THERAPY | Age: 80
End: 2025-02-28

## 2025-02-28 DIAGNOSIS — I69.354 HEMIPARESIS AFFECTING LEFT SIDE AS LATE EFFECT OF STROKE (HCC): Primary | ICD-10-CM

## 2025-02-28 NOTE — PROGRESS NOTES
[facility]          Phone: [phone] Fax: [phone]    Physical Therapy Daily Treatment Note  Date:  2025    Patient Name:  Cristiane Larry    :  1945  MRN: [id number]    Evaluating therapist: Ev Plaza PT, DPT  [id number]    Restrictions/Precautions:      Diagnosis:     Diagnosis Orders   1. Hemiparesis affecting left side as late effect of stroke (HCC)            Treatment Diagnosis:    Insurance/Certification information:  [facility] Medicare  [employer] Advantage  Referring Physician:  Sylwia Almaraz MD  Plan of care signed (Y/N):    Visit# / total visits:    Pain level: 0/10   Time In:  1330  Time Out:  1400    Subjective:  Pt reports good compliance with HEP and feeling appropriate ability to initiate exercises at home to challenge balance within a safe level.    Exercises:  Exercise/Equipment Resistance/Repetitions Other comments            Standing heel raises 15x No UE support     Standing marches (reciprocal) 15x Light B UE support     Standing 3-way kicks 10x ea LE Light B UE support            Stepper 5 min Level 1, using B UE/LE     Step ups (forward) 10x ea LE leading  Light B UE on HR for balance. 6\" step         Obstacle course:     Cone weaving, step up/over, picking up weighted ball, stairs 3x laps    Tandem walking 4x laps (20')    Static standing with head turns 10x SBA, standing in // bars for safety   Static standing with vertical head movements 10x SBA, standing in // bars for safety                                                             Other:  Pt tolerated progression of dynamic balance training with mild imbalance initially with each task particularly tandem walking that improved with increased repetition and mild cueing for technique. Pt was able to tolerate dynamic head movements without imbalance or external support.    Home Exercise Program:  heel raises, mini squats, 3-way kicks, reciprocal marching (all at counter)    Manual

## 2025-03-03 ENCOUNTER — TREATMENT (OUTPATIENT)
Dept: PHYSICAL THERAPY | Age: 80
End: 2025-03-03
Payer: MEDICARE

## 2025-03-03 DIAGNOSIS — I69.354 HEMIPARESIS AFFECTING LEFT SIDE AS LATE EFFECT OF STROKE (HCC): Primary | ICD-10-CM

## 2025-03-03 PROCEDURE — 97110 THERAPEUTIC EXERCISES: CPT

## 2025-03-03 PROCEDURE — 97112 NEUROMUSCULAR REEDUCATION: CPT

## 2025-03-03 NOTE — PROGRESS NOTES
[facility]          Phone: [phone] Fax: [phone]    Physical Therapy Daily Treatment Note  Date:  3/3/2025    Patient Name:  Cristiane Larry    :  1945  MRN: [id number]    Evaluating therapist: Ev Plaza, PT, DPT  [id number]    Restrictions/Precautions:      Diagnosis:     Diagnosis Orders   1. Hemiparesis affecting left side as late effect of stroke (HCC)            Treatment Diagnosis:    Insurance/Certification information:  [facility] Medicare  [employer] Advantage  Referring Physician:  Sylwia Almaraz MD  Plan of care signed (Y/N):    Visit# / total visits:  3/8  Pain level: 0/10   Time In:  1040  Time Out:  1105    Subjective:  Pt reports continued good compliance with HEP within a safe level. Session limited d/t pt late arrival.     Exercises:  Exercise/Equipment Resistance/Repetitions Other comments            Standing heel raises 15x No UE support     Standing marches (reciprocal) 15x Light B UE support     Standing 3-way kicks 10x ea LE Light B UE support            Stepper 5 min Level 1, using B UE/LE     Step ups (forward) 10x ea LE leading  Light B UE on HR for balance. 6\" step         Obstacle course:     Cone weaving, step up/over, picking up weighted ball, stairs 3x laps    Tandem walking 4x laps (20')    Static standing with head turns 10x SBA, standing in // bars for safety   Static standing with vertical head movements 10x SBA, standing in // bars for safety                                                             Other:  Pt tolerated dynamic balance training with mild imbalance that improved with increased repetition and mild cueing for technique. Pt was able to tolerate dynamic head movements without imbalance or external support.    Home Exercise Program:  heel raises, mini squats, 3-way kicks, reciprocal marching (all at counter)    Manual Treatments:  --    Modalities:  --     Time-in Time-out Total Time   33434  Evaluation Low Complexity

## 2025-03-10 ENCOUNTER — TREATMENT (OUTPATIENT)
Dept: PHYSICAL THERAPY | Age: 80
End: 2025-03-10
Payer: MEDICARE

## 2025-03-10 DIAGNOSIS — I69.354 HEMIPARESIS AFFECTING LEFT SIDE AS LATE EFFECT OF STROKE (HCC): Primary | ICD-10-CM

## 2025-03-10 PROCEDURE — 97110 THERAPEUTIC EXERCISES: CPT

## 2025-03-10 PROCEDURE — 97112 NEUROMUSCULAR REEDUCATION: CPT

## 2025-03-10 NOTE — PROGRESS NOTES
[facility]          Phone: [phone] Fax: [phone]    Physical Therapy Daily Treatment Note  Date:  3/10/2025    Patient Name:  Cristiane Larry    :  1945  MRN: [id number]    Evaluating therapist: Ev Plaza PT, DPT  [id number]    Restrictions/Precautions:      Diagnosis:     Diagnosis Orders   1. Hemiparesis affecting left side as late effect of stroke (HCC)            Treatment Diagnosis:    Insurance/Certification information:  [facility] Medicare  [employer] Advantage  Referring Physician:  Sylwia Almaraz MD  Plan of care signed (Y/N):    Visit# / total visits:    Pain level: 0/10   Time In:  1034  Time Out:  1101    Subjective:  Pt reports continued good compliance with HEP within a safe level.     Exercises:  Exercise/Equipment Resistance/Repetitions Other comments            Standing heel raises 15x No UE support     Standing marches (reciprocal) 15x Light B UE support     Standing 3-way kicks 10x ea LE Light B UE support            Stepper 5 min Level 1, using B UE/LE     Step ups (forward) 10x ea LE leading  Light B UE on HR for balance. 6\" step         Obstacle course:     Cone weaving, step up/over, picking up weighted ball, stairs 3x laps    Tandem walking 4x laps (20')    Static standing with head turns 10x SBA, standing in // bars for safety   Static standing with vertical head movements 10x SBA, standing in // bars for safety                                                             Other:  Pt tolerated dynamic balance training with mild imbalance that improved with increased repetition and mild cueing for technique. Pt was able to tolerate dynamic head movements with mild  imbalance and light support today    Home Exercise Program:  heel raises, mini squats, 3-way kicks, reciprocal marching (all at counter)    Manual Treatments:  --    Modalities:  --     Time-in Time-out Total Time   94714  Evaluation Low Complexity      88030  Evaluation Med Complexity

## 2025-03-24 ENCOUNTER — TELEPHONE (OUTPATIENT)
Dept: PHYSICAL THERAPY | Age: 80
End: 2025-03-24

## 2025-03-24 ENCOUNTER — TREATMENT (OUTPATIENT)
Dept: PHYSICAL THERAPY | Age: 80
End: 2025-03-24

## 2025-03-24 NOTE — TELEPHONE ENCOUNTER
Pt called to cancel her appointment today. She is cancelling d/t a fall she had on 3/15/25. She stated she has bruised ribs, black eye , and L thumb injured / bruised. She did not feel she could perform therapy today. Therapist left message for pt to call and reschedule PT appointment when able to tolerate. Unsure if pt sought medical intervention for this fall on 3/15/25.     Tonya Arias PTA [id number]

## 2025-05-13 DIAGNOSIS — Z98.890 HISTORY OF RESECTION OF MENINGIOMA: ICD-10-CM

## 2025-05-13 DIAGNOSIS — E78.2 MIXED HYPERLIPIDEMIA: ICD-10-CM

## 2025-05-13 DIAGNOSIS — Z86.03 HISTORY OF RESECTION OF MENINGIOMA: ICD-10-CM

## 2025-05-15 RX ORDER — ATORVASTATIN CALCIUM 40 MG/1
40 TABLET, FILM COATED ORAL NIGHTLY
Qty: 90 TABLET | Refills: 3 | Status: SHIPPED | OUTPATIENT
Start: 2025-05-15

## 2025-05-15 RX ORDER — LAMOTRIGINE 100 MG/1
TABLET ORAL
Qty: 180 TABLET | Refills: 0 | Status: SHIPPED | OUTPATIENT
Start: 2025-05-15

## 2025-05-16 NOTE — PROGRESS NOTES
Date:  5/16/2025          Dear Sylwia Almaraz MD:       This is to inform you that, as per [facility] Physical Therapy department policy, your patient Cristiane Larry is as of today’s date being discharged from Physical Therapy secondary to the calling to cancel appt after a fall with multiple areas of bruising and not feeling she would be able to tolerate therapy interventions at that time. Pt was advised to seek evaluation of condition when she called in to speak with clinic. She did not schedule any further treatment with our clinic since then.    Thank you for the referral and opportunity to participate in Cristiane's care.      If you have any questions, please feel free to call at [phone]      Thank you          Ev Plaza, PT, DPT  [id number]      [phone]    The information contained in this Fax the designated recipients intend message only for the personal and confidential use named above.  This information is to be handled as privileged and confidential.  If the reader of this message is not the intended recipient, you are hereby notified that you have received this document in error and that any review, dissemination, distribution or copying of this message is strictly prohibited.  If you receive this communication in error, please notify us immediately at the above number and return the original to us by mail.  Thank you      [facility]  [facility] [address], [place] [address]

## 2025-07-15 DIAGNOSIS — G40.909 SEIZURE DISORDER (HCC): ICD-10-CM

## 2025-07-15 DIAGNOSIS — Z98.890 HISTORY OF RESECTION OF MENINGIOMA: ICD-10-CM

## 2025-07-15 DIAGNOSIS — Z86.03 HISTORY OF RESECTION OF MENINGIOMA: ICD-10-CM

## 2025-07-16 RX ORDER — LEVETIRACETAM 500 MG/1
500 TABLET ORAL 2 TIMES DAILY
Qty: 180 TABLET | Refills: 0 | Status: SHIPPED | OUTPATIENT
Start: 2025-07-16

## 2025-07-16 RX ORDER — LAMOTRIGINE 100 MG/1
TABLET ORAL
Qty: 180 TABLET | Refills: 0 | Status: SHIPPED | OUTPATIENT
Start: 2025-07-16

## 2025-07-16 NOTE — TELEPHONE ENCOUNTER
Last seen 2/4/2025  Next appt 8/5/2025    Requested Prescriptions     Pending Prescriptions Disp Refills    levETIRAcetam (KEPPRA) 500 MG tablet [Pharmacy Med Name: levETIRAcetam 500 MG Oral Tablet] 180 tablet 1     Sig: TAKE 1 TABLET BY MOUTH TWICE  DAILY    lamoTRIgine (LAMICTAL) 100 MG tablet [Pharmacy Med Name: lamoTRIgine 100 MG Oral Tablet] 180 tablet 1     Sig: TAKE 1 TABLET BY MOUTH IN THE  MORNING AND 1 TABLET BY MOUTH  BEFORE BEDTIME    Electronically signed by JALIL SOLANO MA on 7/16/25 at 7:42 AM EDT

## 2025-08-07 RX ORDER — PAROXETINE 10 MG/1
10 TABLET, FILM COATED ORAL DAILY
Qty: 90 TABLET | Refills: 1 | Status: SHIPPED | OUTPATIENT
Start: 2025-08-07